# Patient Record
Sex: MALE | Race: WHITE | NOT HISPANIC OR LATINO | Employment: FULL TIME | URBAN - METROPOLITAN AREA
[De-identification: names, ages, dates, MRNs, and addresses within clinical notes are randomized per-mention and may not be internally consistent; named-entity substitution may affect disease eponyms.]

---

## 2023-04-12 LAB
ALANINE AMINOTRANSFERASE (SGPT) (U/L) IN SER/PLAS: 25 U/L (ref 10–52)
ANION GAP IN SER/PLAS: 11 MMOL/L (ref 10–20)
CALCIUM (MG/DL) IN SER/PLAS: 8.4 MG/DL (ref 8.6–10.3)
CARBON DIOXIDE, TOTAL (MMOL/L) IN SER/PLAS: 27 MMOL/L (ref 21–32)
CHLORIDE (MMOL/L) IN SER/PLAS: 106 MMOL/L (ref 98–107)
CHOLESTEROL (MG/DL) IN SER/PLAS: 147 MG/DL (ref 0–199)
CHOLESTEROL IN HDL (MG/DL) IN SER/PLAS: 60.1 MG/DL
CHOLESTEROL/HDL RATIO: 2.4
COBALAMIN (VITAMIN B12) (PG/ML) IN SER/PLAS: 716 PG/ML (ref 211–911)
CREATININE (MG/DL) IN SER/PLAS: 1.08 MG/DL (ref 0.5–1.3)
ERYTHROCYTE DISTRIBUTION WIDTH (RATIO) BY AUTOMATED COUNT: 13.5 % (ref 11.5–14.5)
ERYTHROCYTE MEAN CORPUSCULAR HEMOGLOBIN CONCENTRATION (G/DL) BY AUTOMATED: 32.5 G/DL (ref 32–36)
ERYTHROCYTE MEAN CORPUSCULAR VOLUME (FL) BY AUTOMATED COUNT: 97 FL (ref 80–100)
ERYTHROCYTES (10*6/UL) IN BLOOD BY AUTOMATED COUNT: 4.31 X10E12/L (ref 4.5–5.9)
ESTIMATED AVERAGE GLUCOSE FOR HBA1C: 146 MG/DL
GFR MALE: 74 ML/MIN/1.73M2
GLUCOSE (MG/DL) IN SER/PLAS: 129 MG/DL (ref 74–99)
HEMATOCRIT (%) IN BLOOD BY AUTOMATED COUNT: 41.8 % (ref 41–52)
HEMOGLOBIN (G/DL) IN BLOOD: 13.6 G/DL (ref 13.5–17.5)
HEMOGLOBIN A1C/HEMOGLOBIN TOTAL IN BLOOD: 6.7 %
LDL: 73 MG/DL (ref 0–99)
LEUKOCYTES (10*3/UL) IN BLOOD BY AUTOMATED COUNT: 3.3 X10E9/L (ref 4.4–11.3)
PLATELETS (10*3/UL) IN BLOOD AUTOMATED COUNT: 133 X10E9/L (ref 150–450)
POTASSIUM (MMOL/L) IN SER/PLAS: 4.2 MMOL/L (ref 3.5–5.3)
PROSTATE SPECIFIC AG (NG/ML) IN SER/PLAS: 1.29 NG/ML (ref 0–4)
SODIUM (MMOL/L) IN SER/PLAS: 140 MMOL/L (ref 136–145)
THYROTROPIN (MIU/L) IN SER/PLAS BY DETECTION LIMIT <= 0.05 MIU/L: 0.68 MIU/L (ref 0.44–3.98)
TRIGLYCERIDE (MG/DL) IN SER/PLAS: 70 MG/DL (ref 0–149)
UREA NITROGEN (MG/DL) IN SER/PLAS: 14 MG/DL (ref 6–23)
VLDL: 14 MG/DL (ref 0–40)

## 2023-04-17 ENCOUNTER — OFFICE VISIT (OUTPATIENT)
Dept: PRIMARY CARE | Facility: CLINIC | Age: 68
End: 2023-04-17
Payer: MEDICARE

## 2023-04-17 VITALS
HEIGHT: 70 IN | BODY MASS INDEX: 27.75 KG/M2 | TEMPERATURE: 98.3 F | DIASTOLIC BLOOD PRESSURE: 90 MMHG | SYSTOLIC BLOOD PRESSURE: 140 MMHG | WEIGHT: 193.8 LBS

## 2023-04-17 DIAGNOSIS — M79.605 PAIN OF LEFT LOWER EXTREMITY: Primary | ICD-10-CM

## 2023-04-17 DIAGNOSIS — E11.9 TYPE 2 DIABETES MELLITUS WITHOUT COMPLICATION, WITHOUT LONG-TERM CURRENT USE OF INSULIN (MULTI): ICD-10-CM

## 2023-04-17 DIAGNOSIS — D72.819 LEUKOPENIA, UNSPECIFIED TYPE: ICD-10-CM

## 2023-04-17 PROBLEM — E53.8 B12 DEFICIENCY: Status: ACTIVE | Noted: 2023-04-17

## 2023-04-17 PROBLEM — M17.0 OSTEOARTHRITIS OF KNEES, BILATERAL: Status: ACTIVE | Noted: 2023-04-17

## 2023-04-17 PROBLEM — K30 FUNCTIONAL DYSPEPSIA: Status: ACTIVE | Noted: 2023-04-17

## 2023-04-17 PROBLEM — E78.5 HYPERLIPIDEMIA: Status: ACTIVE | Noted: 2023-04-17

## 2023-04-17 PROBLEM — D75.89 MACROCYTOSIS: Status: ACTIVE | Noted: 2023-04-17

## 2023-04-17 PROBLEM — N41.1 PROSTATITIS, CHRONIC: Status: ACTIVE | Noted: 2017-06-06

## 2023-04-17 PROBLEM — I10 BENIGN ESSENTIAL HYPERTENSION: Status: ACTIVE | Noted: 2023-04-17

## 2023-04-17 PROBLEM — R79.89 ELEVATED SERUM CREATININE: Status: ACTIVE | Noted: 2023-04-17

## 2023-04-17 PROBLEM — N42.9 DISORDER OF PROSTATE: Status: ACTIVE | Noted: 2017-06-06

## 2023-04-17 PROBLEM — R93.1 AGATSTON CORONARY ARTERY CALCIUM SCORE BETWEEN 200 AND 399: Status: ACTIVE | Noted: 2023-04-17

## 2023-04-17 PROBLEM — N52.9 MALE ERECTILE DYSFUNCTION, UNSPECIFIED: Status: ACTIVE | Noted: 2017-06-06

## 2023-04-17 PROBLEM — D69.6 THROMBOCYTOPENIA (CMS-HCC): Status: ACTIVE | Noted: 2023-04-17

## 2023-04-17 PROBLEM — E03.9 HYPOTHYROIDISM: Status: ACTIVE | Noted: 2023-04-17

## 2023-04-17 PROBLEM — K21.9 GASTROESOPHAGEAL REFLUX DISEASE WITHOUT ESOPHAGITIS: Status: ACTIVE | Noted: 2023-04-17

## 2023-04-17 PROBLEM — K58.9 IRRITABLE BOWEL: Status: ACTIVE | Noted: 2023-04-17

## 2023-04-17 PROCEDURE — 1160F RVW MEDS BY RX/DR IN RCRD: CPT | Performed by: INTERNAL MEDICINE

## 2023-04-17 PROCEDURE — 3044F HG A1C LEVEL LT 7.0%: CPT | Performed by: INTERNAL MEDICINE

## 2023-04-17 PROCEDURE — 1036F TOBACCO NON-USER: CPT | Performed by: INTERNAL MEDICINE

## 2023-04-17 PROCEDURE — 82570 ASSAY OF URINE CREATININE: CPT

## 2023-04-17 PROCEDURE — 3080F DIAST BP >= 90 MM HG: CPT | Performed by: INTERNAL MEDICINE

## 2023-04-17 PROCEDURE — 1159F MED LIST DOCD IN RCRD: CPT | Performed by: INTERNAL MEDICINE

## 2023-04-17 PROCEDURE — G0439 PPPS, SUBSEQ VISIT: HCPCS | Performed by: INTERNAL MEDICINE

## 2023-04-17 PROCEDURE — 3077F SYST BP >= 140 MM HG: CPT | Performed by: INTERNAL MEDICINE

## 2023-04-17 PROCEDURE — 99397 PER PM REEVAL EST PAT 65+ YR: CPT | Performed by: INTERNAL MEDICINE

## 2023-04-17 PROCEDURE — 82043 UR ALBUMIN QUANTITATIVE: CPT

## 2023-04-17 PROCEDURE — 4010F ACE/ARB THERAPY RXD/TAKEN: CPT | Performed by: INTERNAL MEDICINE

## 2023-04-17 PROCEDURE — 1170F FXNL STATUS ASSESSED: CPT | Performed by: INTERNAL MEDICINE

## 2023-04-17 RX ORDER — AMITRIPTYLINE HYDROCHLORIDE 10 MG/1
1 TABLET, FILM COATED ORAL NIGHTLY
COMMUNITY
Start: 2021-05-17 | End: 2024-03-18 | Stop reason: WASHOUT

## 2023-04-17 RX ORDER — LANOLIN ALCOHOL/MO/W.PET/CERES
1000 CREAM (GRAM) TOPICAL
COMMUNITY

## 2023-04-17 RX ORDER — TRAZODONE HYDROCHLORIDE 50 MG/1
TABLET ORAL
COMMUNITY
Start: 2019-02-07 | End: 2023-04-17

## 2023-04-17 RX ORDER — LEVOTHYROXINE SODIUM 150 UG/1
1 TABLET ORAL DAILY
COMMUNITY
Start: 2014-01-29 | End: 2023-08-24 | Stop reason: SDUPTHER

## 2023-04-17 RX ORDER — PRAVASTATIN SODIUM 40 MG/1
40 TABLET ORAL
COMMUNITY
End: 2023-08-24 | Stop reason: SDUPTHER

## 2023-04-17 RX ORDER — METFORMIN HYDROCHLORIDE 500 MG/1
500 TABLET ORAL 2 TIMES DAILY
COMMUNITY
Start: 2018-07-17 | End: 2023-04-24 | Stop reason: SDUPTHER

## 2023-04-17 RX ORDER — ASPIRIN 81 MG/81MG
81 CAPSULE ORAL
COMMUNITY

## 2023-04-17 RX ORDER — LOSARTAN POTASSIUM 100 MG/1
1 TABLET ORAL DAILY
COMMUNITY
Start: 2016-06-30 | End: 2023-08-24 | Stop reason: SDUPTHER

## 2023-04-17 RX ORDER — OMEPRAZOLE 40 MG/1
40 CAPSULE, DELAYED RELEASE ORAL DAILY PRN
COMMUNITY
End: 2023-04-24 | Stop reason: SDUPTHER

## 2023-04-17 RX ORDER — METOPROLOL SUCCINATE 50 MG/1
1 TABLET, EXTENDED RELEASE ORAL DAILY
COMMUNITY
Start: 2017-01-31 | End: 2023-04-26 | Stop reason: SDUPTHER

## 2023-04-17 ASSESSMENT — ENCOUNTER SYMPTOMS
DIZZINESS: 0
ABDOMINAL PAIN: 0
VOMITING: 0
EYE PAIN: 0
DIFFICULTY URINATING: 0
BRUISES/BLEEDS EASILY: 0
POLYDIPSIA: 0
RHINORRHEA: 0
TROUBLE SWALLOWING: 0
SHORTNESS OF BREATH: 0
COUGH: 0
TREMORS: 0
COLOR CHANGE: 0
FATIGUE: 0
FEVER: 0
DIARRHEA: 0
BLOOD IN STOOL: 0
CHILLS: 0
FREQUENCY: 0
SORE THROAT: 0
WOUND: 0
VOICE CHANGE: 0
UNEXPECTED WEIGHT CHANGE: 0
ARTHRALGIAS: 0
PALPITATIONS: 0
ADENOPATHY: 0
NAUSEA: 0
DYSURIA: 0
HEADACHES: 0
HEMATURIA: 0
CONSTIPATION: 0

## 2023-04-17 ASSESSMENT — ACTIVITIES OF DAILY LIVING (ADL)
DRESSING: INDEPENDENT
GROCERY_SHOPPING: INDEPENDENT
TAKING_MEDICATION: INDEPENDENT
BATHING: INDEPENDENT
MANAGING_FINANCES: INDEPENDENT
DOING_HOUSEWORK: INDEPENDENT

## 2023-04-17 ASSESSMENT — PATIENT HEALTH QUESTIONNAIRE - PHQ9
2. FEELING DOWN, DEPRESSED OR HOPELESS: NOT AT ALL
SUM OF ALL RESPONSES TO PHQ9 QUESTIONS 1 AND 2: 0
1. LITTLE INTEREST OR PLEASURE IN DOING THINGS: NOT AT ALL
2. FEELING DOWN, DEPRESSED OR HOPELESS: NOT AT ALL
1. LITTLE INTEREST OR PLEASURE IN DOING THINGS: NOT AT ALL
SUM OF ALL RESPONSES TO PHQ9 QUESTIONS 1 AND 2: 0

## 2023-04-17 NOTE — PATIENT INSTRUCTIONS
Stay focused on a healthy lifestyle with regular exercise.  Lets get back together in 4 months.  Please see the orthopedist as well as a hematologist as we discussed  Make sure you stay up-to-date with your eye doctor.

## 2023-04-17 NOTE — PROGRESS NOTES
"Subjective   Reason for Visit: Raphael Crain is an 67 y.o. male here for a Medicare Wellness visit.          Reviewed all medications by prescribing practitioner or clinical pharmacist (such as prescriptions, OTCs, herbal therapies and supplements) and documented in the medical record.    HPI  Overall well     Patient Care Team:  Keely Bradley MD as PCP - General     Review of Systems   Constitutional:  Negative for chills, fatigue, fever and unexpected weight change.   HENT:  Negative for congestion, ear pain, nosebleeds, rhinorrhea, sore throat, trouble swallowing and voice change.    Eyes:  Negative for pain and visual disturbance.   Respiratory:  Negative for cough and shortness of breath.    Cardiovascular:  Negative for chest pain, palpitations and leg swelling.   Gastrointestinal:  Negative for abdominal pain, blood in stool, constipation, diarrhea, nausea and vomiting.   Endocrine: Negative for cold intolerance, heat intolerance, polydipsia and polyuria.   Genitourinary:  Negative for difficulty urinating, dysuria, frequency, hematuria and urgency.   Musculoskeletal:  Negative for arthralgias.   Skin:  Negative for color change and wound.   Neurological:  Negative for dizziness, tremors and headaches.   Hematological:  Negative for adenopathy. Does not bruise/bleed easily.       Objective   Vitals:  /90   Temp 36.8 °C (98.3 °F)   Ht 1.765 m (5' 9.5\")   Wt 87.9 kg (193 lb 12.8 oz)   BMI 28.21 kg/m²       Physical Exam  Constitutional:       General: He is not in acute distress.     Appearance: Normal appearance. He is not ill-appearing.   HENT:      Head: Normocephalic and atraumatic.      Right Ear: Tympanic membrane and ear canal normal.      Left Ear: Tympanic membrane and ear canal normal.      Nose: Nose normal.      Mouth/Throat:      Mouth: Mucous membranes are moist.      Pharynx: Oropharynx is clear.   Eyes:      General: No scleral icterus.     Extraocular Movements: Extraocular " movements intact.      Conjunctiva/sclera: Conjunctivae normal.      Pupils: Pupils are equal, round, and reactive to light.   Cardiovascular:      Rate and Rhythm: Normal rate and regular rhythm.      Pulses: Normal pulses.      Heart sounds: No murmur heard.     No friction rub.   Pulmonary:      Effort: Pulmonary effort is normal.      Breath sounds: Normal breath sounds. No rhonchi or rales.   Abdominal:      General: Abdomen is flat. Bowel sounds are normal. There is no distension.      Palpations: Abdomen is soft. There is no mass.      Tenderness: There is no abdominal tenderness.   Musculoskeletal:      Cervical back: Normal range of motion and neck supple.      Right lower leg: No edema.      Left lower leg: No edema.   Skin:     General: Skin is warm.   Neurological:      General: No focal deficit present.      Mental Status: He is alert and oriented to person, place, and time.      Cranial Nerves: No cranial nerve deficit.   Psychiatric:         Mood and Affect: Mood normal.         Behavior: Behavior normal.         Judgment: Judgment normal.         Lab Results   Component Value Date    WBC 3.3 (L) 04/12/2023    HGB 13.6 04/12/2023    HCT 41.8 04/12/2023     (L) 04/12/2023    CHOL 147 04/12/2023    TRIG 70 04/12/2023    HDL 60.1 04/12/2023    ALT 25 04/12/2023    AST 21 02/26/2020     04/12/2023    K 4.2 04/12/2023     04/12/2023    CREATININE 1.08 04/12/2023    BUN 14 04/12/2023    CO2 27 04/12/2023    TSH 0.68 04/12/2023    PSA 1.29 04/12/2023    HGBA1C 6.7 (A) 04/12/2023     par   Assessment/Plan   Problem List Items Addressed This Visit    None    #1 LBP- fair  #2 IRBBB-stress test normal 2013. clinically stable  #3 rt chest wall pain- resolved. Follow-up when necessary  #4 hypertension-  stable. con't rx  #5 diabetes- excellent. reviewed at length.pending appt eye doctor. Urine micral today. Continue excellent diet and exercise program. Check labs. Follow-up 4 months  #6 mild  leukopenia/thrombocytopenia/anemia-stable retest.  Reviewed again.  At this point, patient agrees to see hematologist consult entered  #7 elevated AST- normal on retest. Continue to follow  #8 thyroid-recheck 3 mths  #9 lipids- check labs 3 mths  #10 CAD- by coronary artery calcium scan.   #11 chronic prostatitis- follows w/ urology q year.   #12 elevated creatine-normal.  Continue to follow.  #13 ongoing insomnia- reviewed at length. Discussed CBD. We will try low-dose trazodone. Reviewed use side effects and risks.  #14 return of rt rib pain- resolved  #15 gerd- s/p EGD, will try to reduce to qod PPI.   #16 foot pain- good, f/u ortho.  #17 macrocytosis- better. follow  #18 b12- begin b 12 1000 mcg po qd. apparent pernicious anemia, continue. Recheck   #19 dyspepsia/chronic gastritis/IBS- good, stable. f/u GI  #20 leg pain-  ?IT band syndrome.  S/p PT. S/p ortho.  Will c/s sports med.       s/p shingrix 2/2  colo 2015--> 2025

## 2023-04-18 LAB
ALBUMIN (MG/L) IN URINE: 7.9 MG/L
ALBUMIN/CREATININE (UG/MG) IN URINE: 4.9 UG/MG CRT (ref 0–30)
CREATININE (MG/DL) IN URINE: 160 MG/DL (ref 20–370)

## 2023-04-21 ENCOUNTER — TELEPHONE (OUTPATIENT)
Dept: PRIMARY CARE | Facility: CLINIC | Age: 68
End: 2023-04-21
Payer: MEDICARE

## 2023-04-24 DIAGNOSIS — E11.9 TYPE 2 DIABETES MELLITUS WITHOUT COMPLICATION, UNSPECIFIED WHETHER LONG TERM INSULIN USE (MULTI): ICD-10-CM

## 2023-04-24 DIAGNOSIS — K21.9 GASTROESOPHAGEAL REFLUX DISEASE WITHOUT ESOPHAGITIS: ICD-10-CM

## 2023-04-24 RX ORDER — METFORMIN HYDROCHLORIDE 500 MG/1
500 TABLET ORAL
Qty: 180 TABLET | Refills: 1 | Status: SHIPPED | OUTPATIENT
Start: 2023-04-24 | End: 2023-08-24 | Stop reason: SDUPTHER

## 2023-04-24 RX ORDER — OMEPRAZOLE 40 MG/1
40 CAPSULE, DELAYED RELEASE ORAL DAILY
Qty: 90 CAPSULE | Refills: 1 | Status: SHIPPED | OUTPATIENT
Start: 2023-04-24 | End: 2023-08-24 | Stop reason: SDUPTHER

## 2023-04-26 DIAGNOSIS — I10 BENIGN ESSENTIAL HYPERTENSION: Primary | ICD-10-CM

## 2023-04-26 RX ORDER — METOPROLOL SUCCINATE 50 MG/1
50 TABLET, EXTENDED RELEASE ORAL DAILY
Qty: 90 TABLET | Refills: 3 | Status: SHIPPED | OUTPATIENT
Start: 2023-04-26 | End: 2024-03-18 | Stop reason: SDUPTHER

## 2023-04-26 NOTE — TELEPHONE ENCOUNTER
----- Message from Raphael Crain sent at 4/26/2023  2:41 PM EDT -----  Regarding: Prescription refill  Contact: [phone]  I've been trying to get a refill on Metoprolol. I've been out for ten days. My last message must have been garbled because I got a refill for Metformin instead.    Might I please get a refill on the Metoprolol?    [facility]'\[facility] [place] [facility]". [place] [address]     Thank you.    Denys Crain

## 2023-08-11 ENCOUNTER — TELEPHONE (OUTPATIENT)
Dept: PRIMARY CARE | Facility: CLINIC | Age: 68
End: 2023-08-11
Payer: MEDICARE

## 2023-08-11 NOTE — TELEPHONE ENCOUNTER
Pt left a msg stating that he has an up coming appt for a 4 mo follow up.  Is he to have blood work done prior?

## 2023-08-16 NOTE — TELEPHONE ENCOUNTER
Patient stopped in, asking about lab work prior to appt.  He hadn't heard back after leaving message last week.  I advised him of the new policy that labs are now ordered at the time of the appt, not prior.  He verbalized understanding.

## 2023-08-24 ENCOUNTER — OFFICE VISIT (OUTPATIENT)
Dept: PRIMARY CARE | Facility: CLINIC | Age: 68
End: 2023-08-24
Payer: MEDICARE

## 2023-08-24 ENCOUNTER — LAB (OUTPATIENT)
Dept: LAB | Facility: LAB | Age: 68
End: 2023-08-24
Payer: MEDICARE

## 2023-08-24 VITALS
BODY MASS INDEX: 27.86 KG/M2 | SYSTOLIC BLOOD PRESSURE: 130 MMHG | WEIGHT: 191.4 LBS | TEMPERATURE: 97.1 F | DIASTOLIC BLOOD PRESSURE: 82 MMHG

## 2023-08-24 DIAGNOSIS — E78.5 HYPERLIPIDEMIA, UNSPECIFIED HYPERLIPIDEMIA TYPE: ICD-10-CM

## 2023-08-24 DIAGNOSIS — E11.9 TYPE 2 DIABETES MELLITUS WITHOUT COMPLICATION, UNSPECIFIED WHETHER LONG TERM INSULIN USE (MULTI): ICD-10-CM

## 2023-08-24 DIAGNOSIS — D69.6 THROMBOCYTOPENIA (CMS-HCC): ICD-10-CM

## 2023-08-24 DIAGNOSIS — I10 BENIGN ESSENTIAL HYPERTENSION: ICD-10-CM

## 2023-08-24 DIAGNOSIS — E11.9 TYPE 2 DIABETES MELLITUS WITHOUT COMPLICATION, WITHOUT LONG-TERM CURRENT USE OF INSULIN (MULTI): Primary | ICD-10-CM

## 2023-08-24 DIAGNOSIS — K21.9 GASTROESOPHAGEAL REFLUX DISEASE WITHOUT ESOPHAGITIS: ICD-10-CM

## 2023-08-24 DIAGNOSIS — E03.9 HYPOTHYROIDISM, UNSPECIFIED TYPE: ICD-10-CM

## 2023-08-24 DIAGNOSIS — D72.819 LEUKOPENIA, UNSPECIFIED TYPE: ICD-10-CM

## 2023-08-24 DIAGNOSIS — M79.605 PAIN OF LEFT LOWER EXTREMITY: ICD-10-CM

## 2023-08-24 DIAGNOSIS — E11.9 TYPE 2 DIABETES MELLITUS WITHOUT COMPLICATION, WITHOUT LONG-TERM CURRENT USE OF INSULIN (MULTI): ICD-10-CM

## 2023-08-24 LAB
ALANINE AMINOTRANSFERASE (SGPT) (U/L) IN SER/PLAS: 25 U/L (ref 10–52)
ANION GAP IN SER/PLAS: 10 MMOL/L (ref 10–20)
CALCIUM (MG/DL) IN SER/PLAS: 9.2 MG/DL (ref 8.6–10.3)
CARBON DIOXIDE, TOTAL (MMOL/L) IN SER/PLAS: 29 MMOL/L (ref 21–32)
CHLORIDE (MMOL/L) IN SER/PLAS: 105 MMOL/L (ref 98–107)
CREATININE (MG/DL) IN SER/PLAS: 1.09 MG/DL (ref 0.5–1.3)
ERYTHROCYTE DISTRIBUTION WIDTH (RATIO) BY AUTOMATED COUNT: 13.2 % (ref 11.5–14.5)
ERYTHROCYTE MEAN CORPUSCULAR HEMOGLOBIN CONCENTRATION (G/DL) BY AUTOMATED: 33.2 G/DL (ref 32–36)
ERYTHROCYTE MEAN CORPUSCULAR VOLUME (FL) BY AUTOMATED COUNT: 97 FL (ref 80–100)
ERYTHROCYTES (10*6/UL) IN BLOOD BY AUTOMATED COUNT: 4.42 X10E12/L (ref 4.5–5.9)
ESTIMATED AVERAGE GLUCOSE FOR HBA1C: 143 MG/DL
GFR MALE: 73 ML/MIN/1.73M2
GLUCOSE (MG/DL) IN SER/PLAS: 119 MG/DL (ref 74–99)
HEMATOCRIT (%) IN BLOOD BY AUTOMATED COUNT: 42.8 % (ref 41–52)
HEMOGLOBIN (G/DL) IN BLOOD: 14.2 G/DL (ref 13.5–17.5)
HEMOGLOBIN A1C/HEMOGLOBIN TOTAL IN BLOOD: 6.6 %
LEUKOCYTES (10*3/UL) IN BLOOD BY AUTOMATED COUNT: 3.8 X10E9/L (ref 4.4–11.3)
PLATELETS (10*3/UL) IN BLOOD AUTOMATED COUNT: 167 X10E9/L (ref 150–450)
POTASSIUM (MMOL/L) IN SER/PLAS: 4.5 MMOL/L (ref 3.5–5.3)
SODIUM (MMOL/L) IN SER/PLAS: 139 MMOL/L (ref 136–145)
UREA NITROGEN (MG/DL) IN SER/PLAS: 17 MG/DL (ref 6–23)

## 2023-08-24 PROCEDURE — 80048 BASIC METABOLIC PNL TOTAL CA: CPT

## 2023-08-24 PROCEDURE — 84460 ALANINE AMINO (ALT) (SGPT): CPT

## 2023-08-24 PROCEDURE — 36415 COLL VENOUS BLD VENIPUNCTURE: CPT

## 2023-08-24 PROCEDURE — 3079F DIAST BP 80-89 MM HG: CPT | Performed by: INTERNAL MEDICINE

## 2023-08-24 PROCEDURE — 1159F MED LIST DOCD IN RCRD: CPT | Performed by: INTERNAL MEDICINE

## 2023-08-24 PROCEDURE — 3044F HG A1C LEVEL LT 7.0%: CPT | Performed by: INTERNAL MEDICINE

## 2023-08-24 PROCEDURE — 1160F RVW MEDS BY RX/DR IN RCRD: CPT | Performed by: INTERNAL MEDICINE

## 2023-08-24 PROCEDURE — 1036F TOBACCO NON-USER: CPT | Performed by: INTERNAL MEDICINE

## 2023-08-24 PROCEDURE — 85027 COMPLETE CBC AUTOMATED: CPT

## 2023-08-24 PROCEDURE — 4010F ACE/ARB THERAPY RXD/TAKEN: CPT | Performed by: INTERNAL MEDICINE

## 2023-08-24 PROCEDURE — 99214 OFFICE O/P EST MOD 30 MIN: CPT | Performed by: INTERNAL MEDICINE

## 2023-08-24 PROCEDURE — 83036 HEMOGLOBIN GLYCOSYLATED A1C: CPT

## 2023-08-24 PROCEDURE — 3075F SYST BP GE 130 - 139MM HG: CPT | Performed by: INTERNAL MEDICINE

## 2023-08-24 RX ORDER — OMEPRAZOLE 40 MG/1
40 CAPSULE, DELAYED RELEASE ORAL DAILY
Qty: 90 CAPSULE | Refills: 1 | Status: SHIPPED | OUTPATIENT
Start: 2023-08-24 | End: 2024-03-18 | Stop reason: SDUPTHER

## 2023-08-24 RX ORDER — LOSARTAN POTASSIUM 100 MG/1
100 TABLET ORAL DAILY
Qty: 90 TABLET | Refills: 1 | Status: SHIPPED | OUTPATIENT
Start: 2023-08-24 | End: 2024-02-27 | Stop reason: SDUPTHER

## 2023-08-24 RX ORDER — METFORMIN HYDROCHLORIDE 500 MG/1
500 TABLET ORAL
Qty: 180 TABLET | Refills: 1 | Status: SHIPPED | OUTPATIENT
Start: 2023-08-24 | End: 2024-02-27 | Stop reason: SDUPTHER

## 2023-08-24 RX ORDER — PRAVASTATIN SODIUM 40 MG/1
40 TABLET ORAL
Qty: 90 TABLET | Refills: 1 | Status: SHIPPED | OUTPATIENT
Start: 2023-08-24 | End: 2024-02-27 | Stop reason: SDUPTHER

## 2023-08-24 RX ORDER — LEVOTHYROXINE SODIUM 150 UG/1
150 TABLET ORAL DAILY
Qty: 90 TABLET | Refills: 1 | Status: SHIPPED | OUTPATIENT
Start: 2023-08-24 | End: 2024-03-18 | Stop reason: SDUPTHER

## 2023-08-24 NOTE — PROGRESS NOTES
Subjective   Patient ID: Raphael Crain is a 68 y.o. male who presents for 4 month follow up.    HPI   Overall well   #1 LBP  #2 IRBBB   #3 rt chest wall pain- resolved.    #4 hypertension-     #5 diabetes-    #6 mild leukopenia/thrombocytopenia/anemia-   #7 elevated AST-    #8 thyroid  #9 lipids  #10 CAD- by coronary artery calcium scan.   No CP/SOB  #11 chronic prostatitis- follows w/ urology q year.   #12 elevated creatine-normal.  Continue to follow.    #13 ongoing insomnia-    #14 return of rt rib pain- resolved  #15 gerd- s/p EGD,    #16 foot pain- good, f/u ortho.  #17 macrocytosis- better. follow  #18 b12- begin b 12 1000 mcg po qd. apparent pernicious anemia, continue. Recheck   #19 dyspepsia/chronic gastritis/IBS- good, stable. f/u GI  #20 leg pain-  continues     s/p shingrix 2/2  colo 2015--> 2025    Review of Systems    Objective   /82 (BP Location: Left arm, Patient Position: Sitting, BP Cuff Size: Adult)   Temp 36.2 °C (97.1 °F) (Skin)   Wt 86.8 kg (191 lb 6.4 oz)   BMI 27.86 kg/m²     Physical Exam      Lab Results   Component Value Date    WBC 3.3 (L) 04/12/2023    HGB 13.6 04/12/2023    HCT 41.8 04/12/2023     (L) 04/12/2023    CHOL 147 04/12/2023    TRIG 70 04/12/2023    HDL 60.1 04/12/2023    ALT 25 04/12/2023    AST 21 02/26/2020     04/12/2023    K 4.2 04/12/2023     04/12/2023    CREATININE 1.08 04/12/2023    BUN 14 04/12/2023    CO2 27 04/12/2023    TSH 0.68 04/12/2023    PSA 1.29 04/12/2023    HGBA1C 6.7 (A) 04/12/2023       Assessment/Plan     #1 LBP- fair  #2 IRBBB-stress test normal 2013. clinically stable  #3 rt chest wall pain- resolved. Follow-up when necessary  #4 hypertension-  a bit elevated.  Home cuff and record.  Bring cuff to next vist.  Reduce salt.  Follow-up 3 months  #5 diabetes- excellent. reviewed at length.pending appt eye doctor. Continue excellent diet and exercise program. Check labs. Follow-up 4 months  #6 mild  leukopenia/thrombocytopenia/anemia-stable retest.  Reviewed again.  At this point, patient agrees to see hematologist consult entered  #7 elevated AST- normal on retest. Continue to follow  #8 thyroid-recheck 6 mths  #9 lipids- check labs 6 mths  #10 CAD- by coronary artery calcium scan.   #11 chronic prostatitis- follows w/ urology q year.   #12 elevated creatine-normal.  Continue to follow.  #13 ongoing insomnia- reviewed at length. Discussed CBD. We will try low-dose trazodone. Reviewed use side effects and risks.  #14 return of rt rib pain- resolved  #15 gerd- s/p EGD, will try to reduce to qod PPI.   #16 foot pain- good, f/u ortho.  #17 macrocytosis- better. follow  #18 b12- begin b 12 1000 mcg po qd. apparent pernicious anemia, continue. Recheck   #19 dyspepsia/chronic gastritis/IBS- good, stable. f/u GI  #20 leg pain-  ?IT band syndrome.  S/p PT. S/p ortho.  Will c/s sports med.       s/p shingrix 2/2  colo 2015--> 2025  Covid/rsv/flu vaccine fall

## 2023-08-24 NOTE — PATIENT INSTRUCTIONS
Stay focused on a healthy lifestyle.  Reduce salt in your diet.  Check your blood pressures at home and bring her cuff to your next visit.  Please see the orthopedist and the hematologist as we discussed

## 2023-11-06 ENCOUNTER — OFFICE VISIT (OUTPATIENT)
Dept: PAIN MEDICINE | Facility: CLINIC | Age: 68
End: 2023-11-06
Payer: MEDICARE

## 2023-11-06 ENCOUNTER — ANCILLARY PROCEDURE (OUTPATIENT)
Dept: RADIOLOGY | Facility: CLINIC | Age: 68
End: 2023-11-06
Payer: MEDICARE

## 2023-11-06 DIAGNOSIS — M17.0 PRIMARY OSTEOARTHRITIS OF BOTH KNEES: Primary | ICD-10-CM

## 2023-11-06 DIAGNOSIS — M84.361G STRESS FRACTURE OF RIGHT TIBIA WITH DELAYED HEALING: ICD-10-CM

## 2023-11-06 DIAGNOSIS — M19.079 ANKLE ARTHRITIS: ICD-10-CM

## 2023-11-06 PROCEDURE — 3075F SYST BP GE 130 - 139MM HG: CPT | Performed by: PHYSICAL MEDICINE & REHABILITATION

## 2023-11-06 PROCEDURE — 3044F HG A1C LEVEL LT 7.0%: CPT | Performed by: PHYSICAL MEDICINE & REHABILITATION

## 2023-11-06 PROCEDURE — 3079F DIAST BP 80-89 MM HG: CPT | Performed by: PHYSICAL MEDICINE & REHABILITATION

## 2023-11-06 PROCEDURE — 4010F ACE/ARB THERAPY RXD/TAKEN: CPT | Performed by: PHYSICAL MEDICINE & REHABILITATION

## 2023-11-06 PROCEDURE — 1159F MED LIST DOCD IN RCRD: CPT | Performed by: PHYSICAL MEDICINE & REHABILITATION

## 2023-11-06 PROCEDURE — 99204 OFFICE O/P NEW MOD 45 MIN: CPT | Performed by: PHYSICAL MEDICINE & REHABILITATION

## 2023-11-06 PROCEDURE — 1160F RVW MEDS BY RX/DR IN RCRD: CPT | Performed by: PHYSICAL MEDICINE & REHABILITATION

## 2023-11-06 PROCEDURE — 1036F TOBACCO NON-USER: CPT | Performed by: PHYSICAL MEDICINE & REHABILITATION

## 2023-11-06 PROCEDURE — 73590 X-RAY EXAM OF LOWER LEG: CPT | Mod: BILATERAL PROCEDURE | Performed by: RADIOLOGY

## 2023-11-06 PROCEDURE — 73590 X-RAY EXAM OF LOWER LEG: CPT | Mod: 50

## 2023-11-06 NOTE — PROGRESS NOTES
Chief complaint  Pain below  the knees and down toward the ankles    History  Mr Guaman is presenting with pain in the shin bilaterally the pain is worse over the right shin.  The pain is burning and tingling.  It is deep.  Occasionally he will have sensitivity on palpation of the skin.  Of note that he is an avid walker and he was doing 15 to 20 miles a week with stretching and warming up and cooling down.   He started wtih pain in the ankles then knees now the pain is in the shins bilaterally  worse on the  right  Tried rowing machine and that also aggravated pain in the shins  He consulted with podiatry and was given shoe inserts and he use those on a regular basis but did not get much relief from those.    He denied associated back pain except for occasional sensation of tiredness.  Denied any bowel or bladder incontinence.     Denied any fever or chills. No weight loss and no night sweats. No cough or sputum production. No diarrhea   The constipation has been responding to fibers and over the counter medications.     No bladder and bowel incontinence and no other changes in bladder and bowel. No skin changes.  Reports tiredness and fatigability only if the pain is not controlled.   Denied opioids diversion and abuse and denies alcoholism. Denies overuse of the pain medications.    The pain is interfering with activities of daily living, quality of life and quality of sleep. It is limiting the functions and everything takes longer to complete because of the slowing related to the pain. Movements are cautious to avoid aggravation of the symptoms.     Physical Exam  Awake, alert and oriented for time place and persons.  Declined Chaperone for the visit and was adequately  draped for the exam.    Gait widened perimeter of stance but no assistive devices     The Lumbar Spine was tested for the range of motion and inspected for any incisions. I checked for palpable spasms in the paraspinal muscles. I also looked for  trigger points by palpation. The Lumbar facets and sacro-iliac joint were loaded bilaterally. The Hips, Knees and ankles were tested for range of motion and stability, and they were inspected for alignment and contractures, clinical effusion or change in the color of the skin. The orthopedic exam of the lumbar spine and lower limbs showed tenderness over the shins worse on the right compared to the left.  He had increased pain over the changes upon resisted dorsiflexion.  No knee instability.  No ankle instability.  No pain upon palpation of the knees or the ankles range of motion of the lumbar spine is normal     For the lumbar spine and lower limbs included the straight leg raising was tested bilaterally, Sensory to light touch, pinprick, were tested bilaterally. Manual muscle testing of the hip (Flex, Ext, Abd, and Add), Knees (Flex/Ext), Plantar and Dorsiflexors and the big toes extension were tested. DTRs at the knees and ankles and plantar cutaneous were tested. I checked for abnormal movements and tone in the lower limbs. Tinel's sign over the peroneal nerve at the fibular neck was tested. The neurological exam for the lower limbs showed  negative straight leg raising.  No sensorimotor deficits around the knees and ankle.    Hema testing for axial loading and log rotation of the Lumbar spine with the hips negative    Skin of the face, upper and lower limbs showed normal color and texture, no subcutaneous masses, no lesions and no skin ulcers.   Peripheral vascular system: radial and pedal pulses present bilaterally. No edema in the lower limbs, few apparent varicosities.    Diagnosis  Problem List Items Addressed This Visit       Primary osteoarthritis of both knees - Primary    Ankle arthritis    Stress fracture of right tibia with delayed healing    Relevant Orders    XR tibia fibula bilateral 2 views (Completed)        Plan  Reviewed the pain generators.  Went over the types of pain with neuropathic and  nociceptive and different pathologies and therapeutic modalities. Discussed the mechanism of action of interventions from acupuncture, physical therapy , regular exercises, injections, botox, spinal cord stimulation, and role of surgery     I discussed with Mr. Crain about the pain over the shins and discussed with him possibility of stress fracture or   periostitis as related to his walking and running.  I explained to him about the insertion of the foot dorsiflexors over the anterior aspect of the chin and long distance walking may induce stress fracture at the insertion of these tendons over the anterior shin.  I discussed with him about icing and relative rest until we get an x-ray of the tibia.  That sometimes show the pathology.  If he continues to be symptomatic and the x-rays did not give us an answer we will need to consider MRIs of the tibia to rule out the above.    I discussed with him that his symptoms could also be related to radicular pain however his clinical presentation is more in favor of the stress fractures related to long distance walking    The level of clinical decision making in this office visit,  is high, given the high risks of complications with the morbidity and mortality due to the fact that acute and chronic pain may pose a threat to life and bodily function, if under treated, poorly treated, or with failure to maintain adequate treatment and timely medical follow up. Additionally over treatment has its own set of complications including overdosing on the pain medications and also the habit forming potentials with the use of the medications used to treat chronic painful conditions including therapeutic classes classified as dangerous medications. Given the serious and fluctuating nature of pain level and instensity with extensive consideration for whenever pain changes, there is always the risk of prolonged functional impairment requiring close patient monitoring with regular  assessments and reassessments and high level medical decision making at every office visit. The amount and complexity of data reviewed is high given the patient clinical presentation, labs,  data, radiology reports, and other tests as discussed during office visits. Pertinent data whether positive or negative were taken in consideration in the process of making this high level medical decision.    Follow-up after the x-ray or earlier if needed        Addendum I reviewed the results of his x-rays  .  It does show a likely old injury related to stress fracture as we discussed.  I recommend he continue relative rest and icing and to cut back on his walking to half the amount he is doing.  Do not completely stop walking.  We will see how he does in 6 to 8 weeks

## 2023-11-30 ENCOUNTER — OFFICE VISIT (OUTPATIENT)
Dept: PRIMARY CARE | Facility: CLINIC | Age: 68
End: 2023-11-30
Payer: MEDICARE

## 2023-11-30 VITALS
SYSTOLIC BLOOD PRESSURE: 170 MMHG | TEMPERATURE: 97.1 F | WEIGHT: 195.6 LBS | OXYGEN SATURATION: 99 % | HEART RATE: 71 BPM | DIASTOLIC BLOOD PRESSURE: 84 MMHG | BODY MASS INDEX: 28.47 KG/M2

## 2023-11-30 DIAGNOSIS — M79.604 RIGHT LEG PAIN: Primary | ICD-10-CM

## 2023-11-30 DIAGNOSIS — I10 PRIMARY HYPERTENSION: ICD-10-CM

## 2023-11-30 PROCEDURE — 99213 OFFICE O/P EST LOW 20 MIN: CPT | Performed by: INTERNAL MEDICINE

## 2023-11-30 PROCEDURE — 3077F SYST BP >= 140 MM HG: CPT | Performed by: INTERNAL MEDICINE

## 2023-11-30 PROCEDURE — 3044F HG A1C LEVEL LT 7.0%: CPT | Performed by: INTERNAL MEDICINE

## 2023-11-30 PROCEDURE — 1159F MED LIST DOCD IN RCRD: CPT | Performed by: INTERNAL MEDICINE

## 2023-11-30 PROCEDURE — 3079F DIAST BP 80-89 MM HG: CPT | Performed by: INTERNAL MEDICINE

## 2023-11-30 PROCEDURE — 4010F ACE/ARB THERAPY RXD/TAKEN: CPT | Performed by: INTERNAL MEDICINE

## 2023-11-30 PROCEDURE — 1036F TOBACCO NON-USER: CPT | Performed by: INTERNAL MEDICINE

## 2023-11-30 PROCEDURE — 1160F RVW MEDS BY RX/DR IN RCRD: CPT | Performed by: INTERNAL MEDICINE

## 2023-11-30 RX ORDER — GABAPENTIN 100 MG/1
100 CAPSULE ORAL 3 TIMES DAILY
Qty: 90 CAPSULE | Refills: 5 | Status: SHIPPED | OUTPATIENT
Start: 2023-11-30 | End: 2023-11-30 | Stop reason: ENTERED-IN-ERROR

## 2023-11-30 RX ORDER — AMLODIPINE BESYLATE 5 MG/1
5 TABLET ORAL DAILY
Qty: 30 TABLET | Refills: 1 | Status: SHIPPED | OUTPATIENT
Start: 2023-11-30 | End: 2023-11-30

## 2023-11-30 RX ORDER — CYCLOBENZAPRINE HCL 10 MG
10 TABLET ORAL NIGHTLY PRN
Qty: 30 TABLET | Refills: 0 | Status: SHIPPED | OUTPATIENT
Start: 2023-11-30 | End: 2024-01-29

## 2023-11-30 RX ORDER — AMLODIPINE BESYLATE 5 MG/1
5 TABLET ORAL DAILY
Qty: 30 TABLET | Refills: 5 | Status: SHIPPED | OUTPATIENT
Start: 2023-11-30 | End: 2023-11-30 | Stop reason: SDUPTHER

## 2023-11-30 RX ORDER — CYCLOBENZAPRINE HCL 10 MG
10 TABLET ORAL NIGHTLY PRN
Qty: 30 TABLET | Refills: 0 | Status: SHIPPED | OUTPATIENT
Start: 2023-11-30 | End: 2023-11-30 | Stop reason: SDUPTHER

## 2023-11-30 NOTE — PROGRESS NOTES
Subjective   Patient ID: [id number]   hospitals    Raphael Crain is a 68 y.o. male who presents for Leg Pain (Right leg for 18 months.).it happens whenever he sits in the car to drive. If he stands up it goes away.He tried with orthopedic doctor and pain medication nothing helped.        Objective   Visit Vitals  /84 (BP Location: Left arm, Patient Position: Sitting, BP Cuff Size: Adult)   Pulse 71   Temp 36.2 °C (97.1 °F) (Skin)      Review of Systems  All 12 systems reviewed, no other abnormality except that mentioned in HPI.    Physical Exam  Constitutional- no abnormality  ENT- no abnormality  Neck- no swelling  CVS- normal S1 and S2, no murmur.  Pulmonary- clear to auscultation,no rhonchi, no wheezes.  Abdomen- normal- liver and spleen, soft, no distension, bowel sound present.  Neurological- all cranial nerves intact, speech and gait normal, no sensory or motor deficiency.  Musculoskeletal- all pulses are normal, normal movement, no joint swelling.  Skin- no rash, dry.  psychiatry- no suicidal ideation.  Lymph node- no lymphadenopathy      Assessment/Plan   Problem List Items Addressed This Visit    None  Visit Diagnoses       Right leg pain    -  Primary    Relevant Medications    cyclobenzaprine (Flexeril) 10 mg tablet    Primary hypertension        Relevant Medications    amLODIPine (Norvasc) 5 mg tablet        Right lower extremity pain- it is position related, Looks like in his sitting posture, his blood supply is not enough during   but normal during standing. Advised to change his positing during drive  to get a position where he is comfortable.    Marco Cantor MD

## 2023-12-13 DIAGNOSIS — M79.604 RIGHT LEG PAIN: Primary | ICD-10-CM

## 2023-12-20 ENCOUNTER — APPOINTMENT (OUTPATIENT)
Dept: HEMATOLOGY/ONCOLOGY | Facility: CLINIC | Age: 68
End: 2023-12-20
Payer: MEDICARE

## 2023-12-21 ENCOUNTER — OFFICE VISIT (OUTPATIENT)
Dept: PAIN MEDICINE | Facility: CLINIC | Age: 68
End: 2023-12-21
Payer: MEDICARE

## 2023-12-21 VITALS
BODY MASS INDEX: 27.2 KG/M2 | RESPIRATION RATE: 18 BRPM | TEMPERATURE: 97.2 F | SYSTOLIC BLOOD PRESSURE: 150 MMHG | WEIGHT: 190 LBS | HEIGHT: 70 IN | DIASTOLIC BLOOD PRESSURE: 85 MMHG | HEART RATE: 78 BPM | OXYGEN SATURATION: 96 %

## 2023-12-21 DIAGNOSIS — M79.604 RIGHT LEG PAIN: ICD-10-CM

## 2023-12-21 PROCEDURE — 3044F HG A1C LEVEL LT 7.0%: CPT | Performed by: ANESTHESIOLOGY

## 2023-12-21 PROCEDURE — 3079F DIAST BP 80-89 MM HG: CPT | Performed by: ANESTHESIOLOGY

## 2023-12-21 PROCEDURE — 4010F ACE/ARB THERAPY RXD/TAKEN: CPT | Performed by: ANESTHESIOLOGY

## 2023-12-21 PROCEDURE — 3077F SYST BP >= 140 MM HG: CPT | Performed by: ANESTHESIOLOGY

## 2023-12-21 PROCEDURE — 1036F TOBACCO NON-USER: CPT | Performed by: ANESTHESIOLOGY

## 2023-12-21 PROCEDURE — 99214 OFFICE O/P EST MOD 30 MIN: CPT | Performed by: ANESTHESIOLOGY

## 2023-12-21 PROCEDURE — 1125F AMNT PAIN NOTED PAIN PRSNT: CPT | Performed by: ANESTHESIOLOGY

## 2023-12-21 PROCEDURE — 1160F RVW MEDS BY RX/DR IN RCRD: CPT | Performed by: ANESTHESIOLOGY

## 2023-12-21 PROCEDURE — 1159F MED LIST DOCD IN RCRD: CPT | Performed by: ANESTHESIOLOGY

## 2023-12-21 RX ORDER — GABAPENTIN 300 MG/1
CAPSULE ORAL
Qty: 90 CAPSULE | Refills: 1 | Status: SHIPPED | OUTPATIENT
Start: 2023-12-21 | End: 2024-01-30 | Stop reason: SDUPTHER

## 2023-12-21 ASSESSMENT — PATIENT HEALTH QUESTIONNAIRE - PHQ9
SUM OF ALL RESPONSES TO PHQ9 QUESTIONS 1 AND 2: 0
1. LITTLE INTEREST OR PLEASURE IN DOING THINGS: NOT AT ALL
2. FEELING DOWN, DEPRESSED OR HOPELESS: NOT AT ALL

## 2023-12-21 ASSESSMENT — ENCOUNTER SYMPTOMS
WEAKNESS: 1
SHORTNESS OF BREATH: 0
BACK PAIN: 0
DIFFICULTY URINATING: 0
ABDOMINAL PAIN: 0
ADENOPATHY: 0
CHEST TIGHTNESS: 0
LOSS OF SENSATION IN FEET: 1
EYE PAIN: 0
OCCASIONAL FEELINGS OF UNSTEADINESS: 1
DEPRESSION: 0
FEVER: 0

## 2023-12-21 ASSESSMENT — PAIN SCALES - GENERAL
PAINLEVEL_OUTOF10: 7
PAINLEVEL: 7

## 2023-12-21 ASSESSMENT — PAIN - FUNCTIONAL ASSESSMENT: PAIN_FUNCTIONAL_ASSESSMENT: 0-10

## 2023-12-21 ASSESSMENT — COLUMBIA-SUICIDE SEVERITY RATING SCALE - C-SSRS
2. HAVE YOU ACTUALLY HAD ANY THOUGHTS OF KILLING YOURSELF?: NO
6. HAVE YOU EVER DONE ANYTHING, STARTED TO DO ANYTHING, OR PREPARED TO DO ANYTHING TO END YOUR LIFE?: NO
1. IN THE PAST MONTH, HAVE YOU WISHED YOU WERE DEAD OR WISHED YOU COULD GO TO SLEEP AND NOT WAKE UP?: NO

## 2023-12-21 ASSESSMENT — PAIN DESCRIPTION - DESCRIPTORS: DESCRIPTORS: THROBBING

## 2023-12-21 ASSESSMENT — LIFESTYLE VARIABLES: TOTAL SCORE: 0

## 2023-12-21 NOTE — PROGRESS NOTES
Chief Complain    New Patient Visit (Pain is in right leg, that has been going on for a year and a half. Deny neck and back surgery. Has hip and knee pain, currently not taking any meds for pain. Had multiple images done)    History Of Present Illness  Raphael Crain is a 68 y.o. male here for evaluation of right leg pain. The patient has been experiencing these symptoms for last 1 and a quater year(s). The patient describes the pain as throbbing. The patient's current pain score is 7 on a scale from 0-10. The pain is worsened by prolonged sitting and driving  and is alleviated by standing. Since the start of the symptoms the pain has been unchanged.    The patient denies any fever, chills, weight loss, weakness, numbness, bladder/ bowel incontinence, history of cancer, history of IV drug abuse, recent trauma.      Past Medical History  He has a past medical history of Cholesterolosis of gallbladder, Decreased white blood cell count, unspecified, Functional dyspepsia (02/17/2020), Other specified symptoms and signs involving the circulatory and respiratory systems (01/24/2020), Personal history of other diseases of the circulatory system, Personal history of other diseases of the digestive system (01/28/2019), Personal history of other specified conditions (06/12/2014), Personal history of other specified conditions (03/09/2016), and Prediabetes.    Surgical History  He has a past surgical history that includes Other surgical history (02/20/2014); Other surgical history (01/24/2020); Other surgical history (01/24/2020); Other surgical history (09/11/2020); Other surgical history (09/02/2022); and Other surgical history (09/02/2022).    Social History  He reports that he quit smoking about 26 years ago. His smoking use included cigarettes. He has a 48.00 pack-year smoking history. He has been exposed to tobacco smoke. He has never used smokeless tobacco. He reports that he does not currently use alcohol. He reports  that he does not use drugs.    Family History  Family History   Problem Relation Name Age of Onset    Diabetes Mother      Heart failure Father          Allergies  Patient has no known allergies.    Review of Systems  Review of Systems   Constitutional:  Negative for fever.   HENT:  Negative for ear pain.    Eyes:  Negative for pain.   Respiratory:  Negative for chest tightness and shortness of breath.    Cardiovascular:  Negative for chest pain.   Gastrointestinal:  Negative for abdominal pain.   Endocrine: Negative for cold intolerance and heat intolerance.   Genitourinary:  Negative for difficulty urinating.   Musculoskeletal:  Negative for back pain.   Skin:  Negative for rash.   Allergic/Immunologic: Negative for food allergies.   Neurological:  Positive for weakness.   Hematological:  Negative for adenopathy.   Psychiatric/Behavioral:  Negative for suicidal ideas.         Physical Exam  Physical Exam  HENT:      Head: Normocephalic and atraumatic.      Right Ear: External ear normal.      Left Ear: External ear normal.      Nose: Nose normal.      Mouth/Throat:      Mouth: Mucous membranes are moist.   Eyes:      Extraocular Movements: Extraocular movements intact.      Pupils: Pupils are equal, round, and reactive to light.   Cardiovascular:      Rate and Rhythm: Normal rate.   Pulmonary:      Effort: Pulmonary effort is normal.   Abdominal:      Palpations: Abdomen is soft.   Musculoskeletal:      Cervical back: Neck supple.      Lumbar back: No swelling, deformity or tenderness. Decreased range of motion. Positive right straight leg raise test.        Legs:    Skin:     General: Skin is warm.   Neurological:      Mental Status: He is alert and oriented to person, place, and time.      Sensory: Sensation is intact.      Motor: Weakness present.      Deep Tendon Reflexes:      Reflex Scores:       Patellar reflexes are 2+ on the right side and 2+ on the left side.       Achilles reflexes are 0 on the right  "side and 0 on the left side.     Comments: 4+/5 strength of right foot dorsiflexion and right EHL rest all normal  Normal toe walking however unable to do heel walking.   Psychiatric:         Mood and Affect: Mood normal.         Behavior: Behavior normal.           Last Recorded Vitals  Blood pressure 150/85, pulse 78, temperature 36.2 °C (97.2 °F), resp. rate 18, height 1.778 m (5' 10\"), weight 86.2 kg (190 lb), SpO2 96 %.    Reviewed Images  Reviewed and independently interpreted MRI Lumbar spine bilateral L4-5 lateral recess stenosis    Reviewed Labs   Latest Reference Range & Units 08/24/23 08:39   GLUCOSE 74 - 99 mg/dL 119 (H)   SODIUM 136 - 145 mmol/L 139   POTASSIUM 3.5 - 5.3 mmol/L 4.5   CHLORIDE 98 - 107 mmol/L 105   Bicarbonate 21 - 32 mmol/L 29   Anion Gap 10 - 20 mmol/L 10   Blood Urea Nitrogen 6 - 23 mg/dL 17   Creatinine 0.50 - 1.30 mg/dL 1.09   Calcium 8.6 - 10.3 mg/dL 9.2   ALT 10 - 52 U/L 25   (H): Data is abnormally high      Latest Reference Range & Units 08/24/23 08:39   WBC 4.4 - 11.3 x10E9/L 3.8 (L)   RBC 4.50 - 5.90 x10E12/L 4.42 (L)   HEMOGLOBIN 13.5 - 17.5 g/dL 14.2   HEMATOCRIT 41.0 - 52.0 % 42.8   MCV 80 - 100 fL 97   MCHC 32.0 - 36.0 g/dL 33.2   RED CELL DISTRIBUTION WIDTH 11.5 - 14.5 % 13.2   Platelets 150 - 450 x10E9/L 167   (L): Data is abnormally low  Assessment/Plan     Raphael Crain is a 68 y.o. male here for evaluation of right anterolateral aspect of leg pain radiating to ankle and foot, has been experiencing these symptoms for last more than a year or so.  He has been evaluated by several different physicians including his primary care provider, an orthopedic specialist and pain management specialist.  He has had multiple imaging done including an MRI lumbar spine, x-rays of ankle knee and leg.  He has not had any severe osteoarthritic changes of his knee joint.  Review of MRI did reveal some lateral recess stenosis L4-5 which may be responsible for his current symptoms.  The " pain is worse with sitting and driving improves with standing.  He also been recently diagnosed with diabetes, other differential for his pain would include diabetic lumbosacral radicular plexus neuropathy.  He does have a EMG scheduled next month which may help to further delineate the etiology, I would trial him on gabapentin for symptomatic relief.  If he does not respond would consider a diagnostic and therapeutic transforaminal epidural steroid injection.       Boris Michel MD

## 2024-01-04 ENCOUNTER — TELEMEDICINE (OUTPATIENT)
Dept: PAIN MEDICINE | Facility: CLINIC | Age: 69
End: 2024-01-04
Payer: MEDICARE

## 2024-01-04 DIAGNOSIS — M54.16 LUMBAR RADICULOPATHY: Primary | ICD-10-CM

## 2024-01-04 PROCEDURE — 99212 OFFICE O/P EST SF 10 MIN: CPT | Performed by: ANESTHESIOLOGY

## 2024-01-04 ASSESSMENT — PAIN DESCRIPTION - DESCRIPTORS: DESCRIPTORS: THROBBING

## 2024-01-04 ASSESSMENT — PAIN - FUNCTIONAL ASSESSMENT: PAIN_FUNCTIONAL_ASSESSMENT: 0-10

## 2024-01-04 ASSESSMENT — PATIENT HEALTH QUESTIONNAIRE - PHQ9
2. FEELING DOWN, DEPRESSED OR HOPELESS: NOT AT ALL
SUM OF ALL RESPONSES TO PHQ9 QUESTIONS 1 AND 2: 0
1. LITTLE INTEREST OR PLEASURE IN DOING THINGS: NOT AT ALL

## 2024-01-04 ASSESSMENT — COLUMBIA-SUICIDE SEVERITY RATING SCALE - C-SSRS
6. HAVE YOU EVER DONE ANYTHING, STARTED TO DO ANYTHING, OR PREPARED TO DO ANYTHING TO END YOUR LIFE?: NO
1. IN THE PAST MONTH, HAVE YOU WISHED YOU WERE DEAD OR WISHED YOU COULD GO TO SLEEP AND NOT WAKE UP?: NO
2. HAVE YOU ACTUALLY HAD ANY THOUGHTS OF KILLING YOURSELF?: NO

## 2024-01-04 ASSESSMENT — ENCOUNTER SYMPTOMS
OCCASIONAL FEELINGS OF UNSTEADINESS: 0
SHORTNESS OF BREATH: 0
DEPRESSION: 0
LOSS OF SENSATION IN FEET: 0
FEVER: 0

## 2024-01-04 ASSESSMENT — PAIN SCALES - GENERAL: PAINLEVEL_OUTOF10: 5 - MODERATE PAIN

## 2024-01-04 NOTE — PROGRESS NOTES
Chief Complain  Follow-up and Med Management (Started Gabapentin 12/21/2023)       History Of Present Illness  Raphael Crain is a 68 y.o. male here for right leg pain. The patient rates the pain at 3-4  on a scale from 0-10.  The patient describes pain as throbbing.  The pain is worsened by prolonged sitting and is alleviated by medications prescribed pain medications.  Since the last visit the pain has improved.  The patient denies any fever, chills, weight loss, bladder/bowel incontinence.       Past Medical History  He has a past medical history of Cholesterolosis of gallbladder, Decreased white blood cell count, unspecified, Functional dyspepsia (02/17/2020), Other specified symptoms and signs involving the circulatory and respiratory systems (01/24/2020), Personal history of other diseases of the circulatory system, Personal history of other diseases of the digestive system (01/28/2019), Personal history of other specified conditions (06/12/2014), Personal history of other specified conditions (03/09/2016), and Prediabetes.    Surgical History  He has a past surgical history that includes Other surgical history (02/20/2014); Other surgical history (01/24/2020); Other surgical history (01/24/2020); Other surgical history (09/11/2020); Other surgical history (09/02/2022); and Other surgical history (09/02/2022).     Social History  He reports that he quit smoking about 27 years ago. His smoking use included cigarettes. He has a 48.00 pack-year smoking history. He has been exposed to tobacco smoke. He has never used smokeless tobacco. He reports that he does not currently use alcohol. He reports that he does not use drugs.    Family History  Family History   Problem Relation Name Age of Onset    Diabetes Mother      Heart failure Father          Allergies  Patient has no known allergies.    Review of Systems  Review of Systems   Constitutional:  Negative for fever.   Respiratory:  Negative for shortness of  breath.    Cardiovascular:  Negative for chest pain.   Psychiatric/Behavioral:  Negative for suicidal ideas.         Physical Exam  Physical Exam  Neurological:      Mental Status: He is oriented to person, place, and time.           Last Recorded Vitals  There were no vitals taken for this visit.       Assessment/Plan     Raphael Crain is a 68 y.o. male here for follow-up of right leg pain.  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  Last visit he was started on gabapentin with the medication he does report significant improvement in his symptoms.  I would recommend regular exercises, activity modification.  Follow-up with his EMG test which is scheduled later this month.  Continue with gabapentin.  Consider transforaminal epidural steroid injection in future if he continues to have symptoms for diagnostic and therapeutic purposes.       Boris Michel MD

## 2024-01-25 ENCOUNTER — HOSPITAL ENCOUNTER (OUTPATIENT)
Dept: NEUROLOGY | Facility: HOSPITAL | Age: 69
Discharge: HOME | End: 2024-01-25
Payer: MEDICARE

## 2024-01-25 DIAGNOSIS — M79.604 RIGHT LEG PAIN: ICD-10-CM

## 2024-01-25 PROCEDURE — 95885 MUSC TST DONE W/NERV TST LIM: CPT | Performed by: PSYCHIATRY & NEUROLOGY

## 2024-01-25 PROCEDURE — 95908 NRV CNDJ TST 3-4 STUDIES: CPT | Performed by: PSYCHIATRY & NEUROLOGY

## 2024-01-26 ENCOUNTER — TELEPHONE (OUTPATIENT)
Dept: PAIN MEDICINE | Facility: CLINIC | Age: 69
End: 2024-01-26
Payer: MEDICARE

## 2024-01-26 NOTE — TELEPHONE ENCOUNTER
"----- Message from Boris Michel MD sent at 1/25/2024 12:21 PM EST -----  I reviewed the results of the EMG, does appear to have impingement of right L5 nerve root which would explain his symptoms.  If he continues to have significant pain in the next step would be doing an epidural steroid injection.  ----- Message -----  From: Azar Alexander MD  Sent: 1/25/2024  11:33 AM EST  To: Boris Michel MD    [facility] Dr Michel,    This patient had an EMG/NCT testing done in the office today. He requested that I let you know of results as well. Please see report under \"Procedures\"    Thank you and good to \"meet\" you.    Dr Alexander      "

## 2024-01-30 ENCOUNTER — OFFICE VISIT (OUTPATIENT)
Dept: PAIN MEDICINE | Facility: CLINIC | Age: 69
End: 2024-01-30
Payer: MEDICARE

## 2024-01-30 VITALS
RESPIRATION RATE: 16 BRPM | OXYGEN SATURATION: 97 % | HEIGHT: 70 IN | SYSTOLIC BLOOD PRESSURE: 144 MMHG | BODY MASS INDEX: 27.2 KG/M2 | TEMPERATURE: 96.4 F | HEART RATE: 72 BPM | WEIGHT: 190 LBS | DIASTOLIC BLOOD PRESSURE: 80 MMHG

## 2024-01-30 DIAGNOSIS — M79.604 RIGHT LEG PAIN: ICD-10-CM

## 2024-01-30 DIAGNOSIS — M54.16 LUMBAR RADICULOPATHY: Primary | ICD-10-CM

## 2024-01-30 PROCEDURE — 1160F RVW MEDS BY RX/DR IN RCRD: CPT | Performed by: ANESTHESIOLOGY

## 2024-01-30 PROCEDURE — 99213 OFFICE O/P EST LOW 20 MIN: CPT | Performed by: ANESTHESIOLOGY

## 2024-01-30 PROCEDURE — 3077F SYST BP >= 140 MM HG: CPT | Performed by: ANESTHESIOLOGY

## 2024-01-30 PROCEDURE — 3079F DIAST BP 80-89 MM HG: CPT | Performed by: ANESTHESIOLOGY

## 2024-01-30 PROCEDURE — 4010F ACE/ARB THERAPY RXD/TAKEN: CPT | Performed by: ANESTHESIOLOGY

## 2024-01-30 PROCEDURE — 1159F MED LIST DOCD IN RCRD: CPT | Performed by: ANESTHESIOLOGY

## 2024-01-30 PROCEDURE — 1125F AMNT PAIN NOTED PAIN PRSNT: CPT | Performed by: ANESTHESIOLOGY

## 2024-01-30 PROCEDURE — 1036F TOBACCO NON-USER: CPT | Performed by: ANESTHESIOLOGY

## 2024-01-30 RX ORDER — GABAPENTIN 300 MG/1
300 CAPSULE ORAL 3 TIMES DAILY
Qty: 90 CAPSULE | Refills: 3 | Status: SHIPPED | OUTPATIENT
Start: 2024-01-30 | End: 2024-03-04 | Stop reason: SDUPTHER

## 2024-01-30 SDOH — ECONOMIC STABILITY: FOOD INSECURITY: WITHIN THE PAST 12 MONTHS, YOU WORRIED THAT YOUR FOOD WOULD RUN OUT BEFORE YOU GOT MONEY TO BUY MORE.: NEVER TRUE

## 2024-01-30 SDOH — ECONOMIC STABILITY: FOOD INSECURITY: WITHIN THE PAST 12 MONTHS, THE FOOD YOU BOUGHT JUST DIDN'T LAST AND YOU DIDN'T HAVE MONEY TO GET MORE.: NEVER TRUE

## 2024-01-30 SDOH — ECONOMIC STABILITY: HOUSING INSECURITY
IN THE LAST 12 MONTHS, WAS THERE A TIME WHEN YOU DID NOT HAVE A STEADY PLACE TO SLEEP OR SLEPT IN A SHELTER (INCLUDING NOW)?: NO

## 2024-01-30 SDOH — ECONOMIC STABILITY: INCOME INSECURITY: IN THE LAST 12 MONTHS, WAS THERE A TIME WHEN YOU WERE NOT ABLE TO PAY THE MORTGAGE OR RENT ON TIME?: NO

## 2024-01-30 ASSESSMENT — ENCOUNTER SYMPTOMS
DEPRESSION: 0
SHORTNESS OF BREATH: 0
OCCASIONAL FEELINGS OF UNSTEADINESS: 0
LOSS OF SENSATION IN FEET: 0
FEVER: 0

## 2024-01-30 ASSESSMENT — LIFESTYLE VARIABLES
AUDIT-C TOTAL SCORE: 4
HOW MANY STANDARD DRINKS CONTAINING ALCOHOL DO YOU HAVE ON A TYPICAL DAY: 1 OR 2
AUDIT TOTAL SCORE: 4
TOTAL SCORE: 0
HOW OFTEN DURING THE LAST YEAR HAVE YOU FAILED TO DO WHAT WAS NORMALLY EXPECTED FROM YOU BECAUSE OF DRINKING: NEVER
HOW OFTEN DURING THE LAST YEAR HAVE YOU NEEDED AN ALCOHOLIC DRINK FIRST THING IN THE MORNING TO GET YOURSELF GOING AFTER A NIGHT OF HEAVY DRINKING: NEVER
HOW OFTEN DURING THE LAST YEAR HAVE YOU BEEN UNABLE TO REMEMBER WHAT HAPPENED THE NIGHT BEFORE BECAUSE YOU HAD BEEN DRINKING: NEVER
HOW OFTEN DO YOU HAVE A DRINK CONTAINING ALCOHOL: 4 OR MORE TIMES A WEEK
HAVE YOU OR SOMEONE ELSE BEEN INJURED AS A RESULT OF YOUR DRINKING: NO
HOW OFTEN DURING THE LAST YEAR HAVE YOU HAD A FEELING OF GUILT OR REMORSE AFTER DRINKING: NEVER
HOW OFTEN DO YOU HAVE SIX OR MORE DRINKS ON ONE OCCASION: NEVER
HOW OFTEN DURING THE LAST YEAR HAVE YOU FOUND THAT YOU WERE NOT ABLE TO STOP DRINKING ONCE YOU HAD STARTED: NEVER
SKIP TO QUESTIONS 9-10: 1
HAS A RELATIVE, FRIEND, DOCTOR, OR ANOTHER HEALTH PROFESSIONAL EXPRESSED CONCERN ABOUT YOUR DRINKING OR SUGGESTED YOU CUT DOWN: NO

## 2024-01-30 ASSESSMENT — PATIENT HEALTH QUESTIONNAIRE - PHQ9
1. LITTLE INTEREST OR PLEASURE IN DOING THINGS: NOT AT ALL
SUM OF ALL RESPONSES TO PHQ9 QUESTIONS 1 AND 2: 0
2. FEELING DOWN, DEPRESSED OR HOPELESS: NOT AT ALL

## 2024-01-30 ASSESSMENT — PAIN SCALES - GENERAL: PAINLEVEL: 3

## 2024-01-30 NOTE — PROGRESS NOTES
Chief Complain  Follow-up (Had a EMG want to discuss what the next steps are, Gabapentin is giving about 75% relief )       History Of Present Illness  Raphael Crain is a 68 y.o. male here for right leg pain. The patient rates the pain at 2-3  on a scale from 0-10.  The patient describes pain as aching.  The pain is worsened by  exercise  and is alleviated by medications prescribed pain medications.  Since the last visit the pain has improved.  The patient denies any fever, chills, weight loss, bladder/bowel incontinence.         Past Medical History  He has a past medical history of Cholesterolosis of gallbladder, Decreased white blood cell count, unspecified, Functional dyspepsia (02/17/2020), Other specified symptoms and signs involving the circulatory and respiratory systems (01/24/2020), Personal history of other diseases of the circulatory system, Personal history of other diseases of the digestive system (01/28/2019), Personal history of other specified conditions (06/12/2014), Personal history of other specified conditions (03/09/2016), and Prediabetes.    Surgical History  He has a past surgical history that includes Other surgical history (02/20/2014); Other surgical history (01/24/2020); Other surgical history (01/24/2020); Other surgical history (09/11/2020); Other surgical history (09/02/2022); and Other surgical history (09/02/2022).     Social History  He reports that he quit smoking about 27 years ago. His smoking use included cigarettes. He has a 48.00 pack-year smoking history. He has been exposed to tobacco smoke. He has never used smokeless tobacco. He reports current alcohol use of about 14.0 standard drinks of alcohol per week. He reports that he does not use drugs.    Family History  Family History   Problem Relation Name Age of Onset    Diabetes Mother      Heart failure Father          Allergies  Patient has no known allergies.    Review of Systems  Review of Systems   Constitutional:   "Negative for fever.   Respiratory:  Negative for shortness of breath.    Cardiovascular:  Negative for chest pain.   Psychiatric/Behavioral:  Negative for suicidal ideas.         Physical Exam  Physical Exam  HENT:      Head: Normocephalic and atraumatic.   Eyes:      Extraocular Movements: Extraocular movements intact.   Pulmonary:      Effort: Pulmonary effort is normal.   Musculoskeletal:      Cervical back: Neck supple.   Neurological:      Mental Status: He is alert and oriented to person, place, and time.         Last Recorded Vitals  Blood pressure 144/80, pulse 72, temperature 35.8 °C (96.4 °F), temperature source Temporal, resp. rate 16, height 1.778 m (5' 10\"), weight 86.2 kg (190 lb), SpO2 97 %.       Assessment/Plan     Raphael Crain is a 68 y.o. male here for follow-up of right-sided knee pain radiating to right anterolateral leg.  He has been experiencing the symptoms for more than a year or so.  MRI of his lumbar spine did reveal some lateral recess stenosis L4-5, recently an EMG revealed chronic mild L5 radiculopathy which may be responsible for her symptoms.  Last visit he was put on gabapentin which has provided him with 70% relief of pain.  Given the relief I would recommend continuing the treatment with gabapentin.  Consider epidural steroid injection if the pain gets worse.  Continue with activity modification and regular home exercises.         Boris Michel MD  "

## 2024-02-27 DIAGNOSIS — E78.5 HYPERLIPIDEMIA, UNSPECIFIED HYPERLIPIDEMIA TYPE: ICD-10-CM

## 2024-02-27 DIAGNOSIS — I10 BENIGN ESSENTIAL HYPERTENSION: ICD-10-CM

## 2024-02-27 DIAGNOSIS — E11.9 TYPE 2 DIABETES MELLITUS WITHOUT COMPLICATION, UNSPECIFIED WHETHER LONG TERM INSULIN USE (MULTI): ICD-10-CM

## 2024-02-27 RX ORDER — METFORMIN HYDROCHLORIDE 500 MG/1
500 TABLET ORAL
Qty: 180 TABLET | Refills: 1 | Status: SHIPPED | OUTPATIENT
Start: 2024-02-27 | End: 2024-08-25

## 2024-02-27 RX ORDER — LOSARTAN POTASSIUM 100 MG/1
100 TABLET ORAL DAILY
Qty: 90 TABLET | Refills: 1 | Status: SHIPPED | OUTPATIENT
Start: 2024-02-27 | End: 2024-08-25

## 2024-02-28 RX ORDER — PRAVASTATIN SODIUM 40 MG/1
40 TABLET ORAL
Qty: 90 TABLET | Refills: 1 | Status: SHIPPED | OUTPATIENT
Start: 2024-02-28 | End: 2024-08-26

## 2024-03-04 DIAGNOSIS — M79.604 RIGHT LEG PAIN: ICD-10-CM

## 2024-03-04 RX ORDER — GABAPENTIN 300 MG/1
300 CAPSULE ORAL 3 TIMES DAILY
Qty: 90 CAPSULE | Refills: 3 | Status: SHIPPED | OUTPATIENT
Start: 2024-03-04 | End: 2024-07-02

## 2024-03-18 ENCOUNTER — TELEMEDICINE (OUTPATIENT)
Dept: PRIMARY CARE | Facility: CLINIC | Age: 69
End: 2024-03-18
Payer: MEDICARE

## 2024-03-18 DIAGNOSIS — E53.8 B12 DEFICIENCY: ICD-10-CM

## 2024-03-18 DIAGNOSIS — D75.89 MACROCYTOSIS: ICD-10-CM

## 2024-03-18 DIAGNOSIS — K21.9 GASTROESOPHAGEAL REFLUX DISEASE WITHOUT ESOPHAGITIS: ICD-10-CM

## 2024-03-18 DIAGNOSIS — I10 BENIGN ESSENTIAL HYPERTENSION: ICD-10-CM

## 2024-03-18 DIAGNOSIS — D69.6 THROMBOCYTOPENIA (CMS-HCC): ICD-10-CM

## 2024-03-18 DIAGNOSIS — I10 PRIMARY HYPERTENSION: ICD-10-CM

## 2024-03-18 DIAGNOSIS — E78.5 HYPERLIPIDEMIA, UNSPECIFIED HYPERLIPIDEMIA TYPE: ICD-10-CM

## 2024-03-18 DIAGNOSIS — Z00.00 ROUTINE GENERAL MEDICAL EXAMINATION AT A HEALTH CARE FACILITY: ICD-10-CM

## 2024-03-18 DIAGNOSIS — R93.1 AGATSTON CORONARY ARTERY CALCIUM SCORE BETWEEN 200 AND 399: ICD-10-CM

## 2024-03-18 DIAGNOSIS — E11.9 TYPE 2 DIABETES MELLITUS WITHOUT COMPLICATION, WITHOUT LONG-TERM CURRENT USE OF INSULIN (MULTI): Primary | ICD-10-CM

## 2024-03-18 DIAGNOSIS — E03.9 HYPOTHYROIDISM, UNSPECIFIED TYPE: ICD-10-CM

## 2024-03-18 PROCEDURE — 1124F ACP DISCUSS-NO DSCNMKR DOCD: CPT | Performed by: INTERNAL MEDICINE

## 2024-03-18 PROCEDURE — 1036F TOBACCO NON-USER: CPT | Performed by: INTERNAL MEDICINE

## 2024-03-18 PROCEDURE — 1159F MED LIST DOCD IN RCRD: CPT | Performed by: INTERNAL MEDICINE

## 2024-03-18 PROCEDURE — 99213 OFFICE O/P EST LOW 20 MIN: CPT | Performed by: INTERNAL MEDICINE

## 2024-03-18 PROCEDURE — 4010F ACE/ARB THERAPY RXD/TAKEN: CPT | Performed by: INTERNAL MEDICINE

## 2024-03-18 PROCEDURE — 1160F RVW MEDS BY RX/DR IN RCRD: CPT | Performed by: INTERNAL MEDICINE

## 2024-03-18 RX ORDER — METOPROLOL SUCCINATE 50 MG/1
50 TABLET, EXTENDED RELEASE ORAL DAILY
Qty: 90 TABLET | Refills: 3 | Status: SHIPPED | OUTPATIENT
Start: 2024-03-18 | End: 2025-03-18

## 2024-03-18 RX ORDER — OMEPRAZOLE 40 MG/1
40 CAPSULE, DELAYED RELEASE ORAL DAILY
Qty: 90 CAPSULE | Refills: 1 | Status: SHIPPED | OUTPATIENT
Start: 2024-03-18 | End: 2024-09-14

## 2024-03-18 RX ORDER — LEVOTHYROXINE SODIUM 150 UG/1
150 TABLET ORAL DAILY
Qty: 90 TABLET | Refills: 3 | Status: SHIPPED | OUTPATIENT
Start: 2024-03-18 | End: 2025-03-18

## 2024-03-18 RX ORDER — AMLODIPINE BESYLATE 5 MG/1
5 TABLET ORAL DAILY
Qty: 90 TABLET | Refills: 3 | Status: SHIPPED | OUTPATIENT
Start: 2024-03-18

## 2024-03-18 NOTE — PROGRESS NOTES
Subjective   Patient ID: Raphael Crain is a 68 y.o. male who presents for No chief complaint on file..    HPI   Overall well   #1 LBP- working w/ pain med.   #2 IRBBB   #3 rt chest wall pain- resolved.    #4 hypertension-     #5 diabetes- +exercise--> 4x/week.  Diet fair   #6 mild leukopenia/thrombocytopenia/anemia-   #7 elevated AST-    #8 thyroid  #9 lipids  #10 CAD- by coronary artery calcium scan.   No CP/SOB  #11 chronic prostatitis- follows w/ urology q year.   #12 elevated creatine-normal.  Continue to follow.    #13 ongoing insomnia-    #14 return of rt rib pain- resolved  #15 gerd- s/p EGD,    #16 foot pain- good, f/u ortho.  #17 macrocytosis- better. follow  #18 b12- on b 12 1000 mcg po qd.    #19 dyspepsia/chronic gastritis/IBS- good, stable. f/u GI     Review of Systems    Objective   There were no vitals taken for this visit.    Physical Exam      Lab Results   Component Value Date    WBC 3.8 (L) 08/24/2023    HGB 14.2 08/24/2023    HCT 42.8 08/24/2023     08/24/2023    CHOL 147 04/12/2023    TRIG 70 04/12/2023    HDL 60.1 04/12/2023    ALT 25 08/24/2023    AST 21 02/26/2020     08/24/2023    K 4.5 08/24/2023     08/24/2023    CREATININE 1.09 08/24/2023    BUN 17 08/24/2023    CO2 29 08/24/2023    TSH 0.68 04/12/2023    PSA 1.29 04/12/2023    HGBA1C 6.6 (A) 08/24/2023       Assessment/Plan     #1 LBP-  L5 radiculopathy- f/u  pain med  #2 IRBBB-stress test normal 2013. clinically stable  #3 rt chest wall pain- resolved. Follow-up when necessary  #4 hypertension-  off amlodipine x 1 mth--> bps 140s/80s.  Resume rx, f/u  3 weeks.  #5 diabetes- excellent last test.  Labs now. . reviewed at length.  pending appt eye doctor. Continue excellent diet and exercise program.  Follow-up 4 months  #6 mild leukopenia/thrombocytopenia/anemia-stable retest.  Reviewed again.  At this point, patient agrees to see hematologist consult entered  #7 elevated AST- normal on retest. Continue to follow  #8  thyroid-recheck    #9 lipids- check labs   #10 CAD- by coronary artery calcium scan.  Con't asa/statin  #11 chronic prostatitis- follows w/ urology q year.   #12 elevated creatine-normal.  Continue to follow.    #13 ongoing insomnia- reviewed   #14 return of rt rib pain- resolved  #15 gerd- s/p EGD, will try to reduce to qod PPI.   #16 foot pain- good, f/u ortho.  #17 macrocytosis- better. follow  #18 b12- on b12 1000 mcg po qd. apparent pernicious anemia, continue. Recheck   #19 dyspepsia/chronic gastritis/IBS- good, stable. f/u GI      full code  s/p shingrix 2/2  colo 2015--> 2025  Covid/rsv/flu vaccine fall

## 2024-07-29 ENCOUNTER — LAB (OUTPATIENT)
Dept: LAB | Facility: LAB | Age: 69
End: 2024-07-29
Payer: MEDICARE

## 2024-07-29 ENCOUNTER — OFFICE VISIT (OUTPATIENT)
Dept: URGENT CARE | Facility: CLINIC | Age: 69
End: 2024-07-29
Payer: MEDICARE

## 2024-07-29 VITALS — OXYGEN SATURATION: 98 % | SYSTOLIC BLOOD PRESSURE: 138 MMHG | DIASTOLIC BLOOD PRESSURE: 78 MMHG | HEART RATE: 75 BPM

## 2024-07-29 DIAGNOSIS — M25.561 ACUTE PAIN OF RIGHT KNEE: Primary | ICD-10-CM

## 2024-07-29 DIAGNOSIS — M25.561 ACUTE PAIN OF RIGHT KNEE: ICD-10-CM

## 2024-07-29 LAB — URATE SERPL-MCNC: 4.9 MG/DL (ref 4–7.5)

## 2024-07-29 PROCEDURE — 84550 ASSAY OF BLOOD/URIC ACID: CPT

## 2024-07-29 PROCEDURE — 99203 OFFICE O/P NEW LOW 30 MIN: CPT | Performed by: NURSE PRACTITIONER

## 2024-07-29 PROCEDURE — 3075F SYST BP GE 130 - 139MM HG: CPT | Performed by: NURSE PRACTITIONER

## 2024-07-29 PROCEDURE — 1159F MED LIST DOCD IN RCRD: CPT | Performed by: NURSE PRACTITIONER

## 2024-07-29 PROCEDURE — 36415 COLL VENOUS BLD VENIPUNCTURE: CPT

## 2024-07-29 PROCEDURE — 3078F DIAST BP <80 MM HG: CPT | Performed by: NURSE PRACTITIONER

## 2024-07-29 PROCEDURE — 4010F ACE/ARB THERAPY RXD/TAKEN: CPT | Performed by: NURSE PRACTITIONER

## 2024-07-29 RX ORDER — PREDNISONE 20 MG/1
40 TABLET ORAL DAILY
Qty: 10 TABLET | Refills: 0 | Status: SHIPPED | OUTPATIENT
Start: 2024-07-29 | End: 2024-08-03

## 2024-07-29 NOTE — PROGRESS NOTES
Subjective   Patient ID: Raphael Crain is a 69 y.o. male.    Patient presents with R toyin pain, redness, swelling, instability, feeling of knee buckling. No obvious injury. Sx x 24hrs. patient denies any injury.  He is a diabetic hemoglobin A1c 6.0 only on metformin.  States there is pain and swelling he woke up with it.  He has tried some ibuprofen and ice with some relief.  No history of gout.  Denies any fever, chills, chest pain, shortness of breath, nausea or vomiting      History provided by:  Patient      The following portions of the chart were reviewed this encounter and updated as appropriate:         Review of Systems   All other systems reviewed and are negative.    Objective   Physical Exam  Vitals and nursing note reviewed.   Constitutional:       General: He is not in acute distress.     Appearance: Normal appearance. He is not toxic-appearing.   HENT:      Head: Atraumatic.      Right Ear: External ear normal.      Left Ear: External ear normal.      Nose: Nose normal.      Mouth/Throat:      Mouth: Mucous membranes are moist.   Eyes:      Extraocular Movements: Extraocular movements intact.      Conjunctiva/sclera: Conjunctivae normal.   Cardiovascular:      Rate and Rhythm: Normal rate and regular rhythm.      Heart sounds: No murmur heard.     No gallop.   Pulmonary:      Effort: Pulmonary effort is normal. No respiratory distress.      Breath sounds: Normal breath sounds. No wheezing.   Musculoskeletal:         General: Normal range of motion.      Cervical back: Normal range of motion.      Right knee: Swelling present. No deformity, erythema, ecchymosis, bony tenderness or crepitus. Normal range of motion. Tenderness present over the medial joint line.   Skin:     General: Skin is warm and dry.      Capillary Refill: Capillary refill takes less than 2 seconds.      Coloration: Skin is not jaundiced.   Neurological:      General: No focal deficit present.      Mental Status: He is alert and  oriented to person, place, and time.   Psychiatric:         Mood and Affect: Mood normal.         Behavior: Behavior normal.         Thought Content: Thought content normal.       Procedures    Assessment/Plan   Diagnoses and all orders for this visit:  Acute pain of right knee  -     Uric acid; Future  -     predniSONE (Deltasone) 20 mg tablet; Take 2 tablets (40 mg) by mouth once daily for 5 days.  Follow up on uric acid    Patient disposition: Home

## 2024-08-20 ENCOUNTER — APPOINTMENT (OUTPATIENT)
Dept: PRIMARY CARE | Facility: CLINIC | Age: 69
End: 2024-08-20
Payer: MEDICARE

## 2024-08-20 VITALS
DIASTOLIC BLOOD PRESSURE: 82 MMHG | SYSTOLIC BLOOD PRESSURE: 122 MMHG | WEIGHT: 198.6 LBS | HEIGHT: 71 IN | BODY MASS INDEX: 27.8 KG/M2

## 2024-08-20 DIAGNOSIS — M25.561 CHRONIC PAIN OF RIGHT KNEE: ICD-10-CM

## 2024-08-20 DIAGNOSIS — G89.29 CHRONIC PAIN OF RIGHT KNEE: ICD-10-CM

## 2024-08-20 DIAGNOSIS — E11.9 TYPE 2 DIABETES MELLITUS WITHOUT COMPLICATION, UNSPECIFIED WHETHER LONG TERM INSULIN USE (MULTI): ICD-10-CM

## 2024-08-20 DIAGNOSIS — I10 PRIMARY HYPERTENSION: ICD-10-CM

## 2024-08-20 DIAGNOSIS — I10 BENIGN ESSENTIAL HYPERTENSION: ICD-10-CM

## 2024-08-20 DIAGNOSIS — Z00.00 WELL ADULT EXAM: Primary | ICD-10-CM

## 2024-08-20 DIAGNOSIS — E53.8 B12 DEFICIENCY: ICD-10-CM

## 2024-08-20 PROCEDURE — 1159F MED LIST DOCD IN RCRD: CPT | Performed by: INTERNAL MEDICINE

## 2024-08-20 PROCEDURE — 1170F FXNL STATUS ASSESSED: CPT | Performed by: INTERNAL MEDICINE

## 2024-08-20 PROCEDURE — 3079F DIAST BP 80-89 MM HG: CPT | Performed by: INTERNAL MEDICINE

## 2024-08-20 PROCEDURE — 1160F RVW MEDS BY RX/DR IN RCRD: CPT | Performed by: INTERNAL MEDICINE

## 2024-08-20 PROCEDURE — 99214 OFFICE O/P EST MOD 30 MIN: CPT | Performed by: INTERNAL MEDICINE

## 2024-08-20 PROCEDURE — 4010F ACE/ARB THERAPY RXD/TAKEN: CPT | Performed by: INTERNAL MEDICINE

## 2024-08-20 PROCEDURE — 1036F TOBACCO NON-USER: CPT | Performed by: INTERNAL MEDICINE

## 2024-08-20 PROCEDURE — 3008F BODY MASS INDEX DOCD: CPT | Performed by: INTERNAL MEDICINE

## 2024-08-20 PROCEDURE — 1124F ACP DISCUSS-NO DSCNMKR DOCD: CPT | Performed by: INTERNAL MEDICINE

## 2024-08-20 PROCEDURE — G0439 PPPS, SUBSEQ VISIT: HCPCS | Performed by: INTERNAL MEDICINE

## 2024-08-20 PROCEDURE — 3074F SYST BP LT 130 MM HG: CPT | Performed by: INTERNAL MEDICINE

## 2024-08-20 RX ORDER — METFORMIN HYDROCHLORIDE 500 MG/1
500 TABLET ORAL
Qty: 180 TABLET | Refills: 1 | Status: SHIPPED | OUTPATIENT
Start: 2024-08-20 | End: 2025-02-16

## 2024-08-20 RX ORDER — LOSARTAN POTASSIUM 100 MG/1
100 TABLET ORAL DAILY
Qty: 90 TABLET | Refills: 1 | Status: SHIPPED | OUTPATIENT
Start: 2024-08-20 | End: 2025-02-16

## 2024-08-20 ASSESSMENT — ACTIVITIES OF DAILY LIVING (ADL)
GROCERY_SHOPPING: INDEPENDENT
BATHING: INDEPENDENT
DOING_HOUSEWORK: INDEPENDENT
DRESSING: INDEPENDENT
MANAGING_FINANCES: INDEPENDENT
TAKING_MEDICATION: INDEPENDENT

## 2024-08-20 ASSESSMENT — PATIENT HEALTH QUESTIONNAIRE - PHQ9
2. FEELING DOWN, DEPRESSED OR HOPELESS: NOT AT ALL
SUM OF ALL RESPONSES TO PHQ9 QUESTIONS 1 AND 2: 0
SUM OF ALL RESPONSES TO PHQ9 QUESTIONS 1 AND 2: 0
2. FEELING DOWN, DEPRESSED OR HOPELESS: NOT AT ALL
1. LITTLE INTEREST OR PLEASURE IN DOING THINGS: NOT AT ALL
1. LITTLE INTEREST OR PLEASURE IN DOING THINGS: NOT AT ALL

## 2024-08-20 NOTE — PROGRESS NOTES
"Subjective   Reason for Visit: Raphael Crain is an 69 y.o. male here for a Medicare Wellness visit.     Past Medical, Surgical, and Family History reviewed and updated in chart.    Reviewed all medications by prescribing practitioner or clinical pharmacist (such as prescriptions, OTCs, herbal therapies and supplements) and documented in the medical record.    HPI    Overall well   #1 LBP- working w/ pain med.   #2 IRBBB   #3 rt chest wall pain- resolved.    #4 hypertension-     #5 diabetes- +exercise--> 4x/week.  Diet fair   #6 mild leukopenia/thrombocytopenia/anemia-   #7 elevated AST-    #8 thyroid  #9 lipids  #10 CAD- by coronary artery calcium scan.   No CP/SOB  #11 chronic prostatitis- follows w/ urology q year.   #12 elevated creatine-normal.  Continue to follow.    #13 ongoing insomnia-    #14 return of rt rib pain- resolved  #15 gerd- s/p EGD,    #16 foot pain- good, f/u ortho.  #17 macrocytosis- better. follow  #18 b12- on b 12 1000 mcg po qd.    #19 dyspepsia/chronic gastritis/IBS- good, stable. f/u GI  Patient Care Team:  Keely Bradley MD as PCP - General  Keely Bradley MD as PCP - [employer] PCP     Review of Systems    Objective   Vitals:  /82   Ht 1.791 m (5' 10.5\")   Wt 90.1 kg (198 lb 9.6 oz)   BMI 28.09 kg/m²       Physical Exam  Constitutional:       General: He is not in acute distress.     Appearance: Normal appearance. He is not ill-appearing.   HENT:      Head: Normocephalic and atraumatic.      Right Ear: Tympanic membrane and ear canal normal.      Left Ear: Tympanic membrane and ear canal normal.      Nose: Nose normal.      Mouth/Throat:      Mouth: Mucous membranes are moist.      Pharynx: Oropharynx is clear.   Eyes:      General: No scleral icterus.     Extraocular Movements: Extraocular movements intact.      Conjunctiva/sclera: Conjunctivae normal.      Pupils: Pupils are equal, round, and reactive to light.   Cardiovascular:      Rate and Rhythm: Normal " rate and regular rhythm.      Pulses: Normal pulses.      Heart sounds: No murmur heard.     No friction rub.   Pulmonary:      Effort: Pulmonary effort is normal.      Breath sounds: Normal breath sounds. No rhonchi or rales.   Abdominal:      General: Abdomen is flat. Bowel sounds are normal. There is no distension.      Palpations: Abdomen is soft. There is no mass.      Tenderness: There is no abdominal tenderness.   Musculoskeletal:      Cervical back: Normal range of motion and neck supple.      Right lower leg: No edema.      Left lower leg: No edema.   Skin:     General: Skin is warm.   Neurological:      General: No focal deficit present.      Mental Status: He is alert and oriented to person, place, and time.      Cranial Nerves: No cranial nerve deficit.   Psychiatric:         Mood and Affect: Mood normal.         Behavior: Behavior normal.         Judgment: Judgment normal.         Lab Results   Component Value Date    WBC 3.8 (L) 08/24/2023    HGB 14.2 08/24/2023    HCT 42.8 08/24/2023     08/24/2023    CHOL 147 04/12/2023    TRIG 70 04/12/2023    HDL 60.1 04/12/2023    ALT 25 08/24/2023    AST 21 02/26/2020     08/24/2023    K 4.5 08/24/2023     08/24/2023    CREATININE 1.09 08/24/2023    BUN 17 08/24/2023    CO2 29 08/24/2023    TSH 0.68 04/12/2023    PSA 1.29 04/12/2023    HGBA1C 6.6 (A) 08/24/2023       Assessment/Plan   Problem List Items Addressed This Visit             ICD-10-CM    Benign essential hypertension I10    Type 2 diabetes mellitus (Multi) E11.9     #1 LBP-  L5 radiculopathy- f/u  pain med  #2 IRBBB-stress test normal 2013. clinically stable  #3 rt chest wall pain- resolved. Follow-up when necessary  #4 hypertension-  off amlodipine x 1 mth--> bps 140s/80s.  Resume rx, f/u  3 weeks.  #5 diabetes- excellent last test.  Labs now. . reviewed at length.  pending appt eye doctor. Continue excellent diet and exercise program.  Follow-up 4 months  #6 mild  leukopenia/thrombocytopenia/anemia-stable retest.  Reviewed again.  At this point, patient agrees to see hematologist consult entered  #7 elevated AST- normal on retest. Continue to follow  #8 thyroid-recheck    #9 lipids- check labs   #10 CAD- by coronary artery calcium scan.  Con't asa/statin  #11 chronic prostatitis- follows w/ urology q year.   #12 elevated creatine-normal.  Continue to follow.    #13 ongoing insomnia- reviewed   #14 return of rt rib pain- resolved  #15 gerd- s/p EGD, will try to reduce to qod PPI.   #16 foot pain- good, f/u ortho.  #17 macrocytosis- better. follow  #18 b12- on b12 1000 mcg po qd. apparent pernicious anemia, continue. Recheck   #19 dyspepsia/chronic gastritis/IBS- good, stable. f/u GI  #20 rt knee pain-reviewed.  Seemingly medial soft tissue issue.  No significant arthritis on x-ray.  Painful to palpation.  Consult orthopedist.      full code  s/p shingrix 2/2  colo 2015--> 2025  Covid/rsv/flu vaccine fall

## 2024-08-30 DIAGNOSIS — E78.5 HYPERLIPIDEMIA, UNSPECIFIED HYPERLIPIDEMIA TYPE: ICD-10-CM

## 2024-08-30 RX ORDER — PRAVASTATIN SODIUM 40 MG/1
40 TABLET ORAL
Qty: 90 TABLET | Refills: 1 | Status: SHIPPED | OUTPATIENT
Start: 2024-08-30 | End: 2025-02-26

## 2024-09-09 ENCOUNTER — APPOINTMENT (OUTPATIENT)
Dept: RADIOLOGY | Facility: CLINIC | Age: 69
End: 2024-09-09
Payer: MEDICARE

## 2024-09-09 ENCOUNTER — APPOINTMENT (OUTPATIENT)
Dept: ORTHOPEDIC SURGERY | Facility: CLINIC | Age: 69
End: 2024-09-09
Payer: MEDICARE

## 2024-09-09 VITALS — HEIGHT: 71 IN | BODY MASS INDEX: 27.86 KG/M2 | WEIGHT: 199 LBS

## 2024-09-09 DIAGNOSIS — M25.561 RIGHT KNEE PAIN, UNSPECIFIED CHRONICITY: ICD-10-CM

## 2024-09-09 DIAGNOSIS — M25.561 CHRONIC PAIN OF RIGHT KNEE: ICD-10-CM

## 2024-09-09 DIAGNOSIS — S83.241A ACUTE MEDIAL MENISCUS TEAR OF RIGHT KNEE, INITIAL ENCOUNTER: ICD-10-CM

## 2024-09-09 DIAGNOSIS — G89.29 CHRONIC PAIN OF RIGHT KNEE: ICD-10-CM

## 2024-09-09 PROCEDURE — 99204 OFFICE O/P NEW MOD 45 MIN: CPT | Performed by: STUDENT IN AN ORGANIZED HEALTH CARE EDUCATION/TRAINING PROGRAM

## 2024-09-09 PROCEDURE — 4010F ACE/ARB THERAPY RXD/TAKEN: CPT | Performed by: STUDENT IN AN ORGANIZED HEALTH CARE EDUCATION/TRAINING PROGRAM

## 2024-09-09 PROCEDURE — 1159F MED LIST DOCD IN RCRD: CPT | Performed by: STUDENT IN AN ORGANIZED HEALTH CARE EDUCATION/TRAINING PROGRAM

## 2024-09-09 PROCEDURE — 73564 X-RAY EXAM KNEE 4 OR MORE: CPT | Mod: RT

## 2024-09-09 PROCEDURE — 1036F TOBACCO NON-USER: CPT | Performed by: STUDENT IN AN ORGANIZED HEALTH CARE EDUCATION/TRAINING PROGRAM

## 2024-09-09 PROCEDURE — 1123F ACP DISCUSS/DSCN MKR DOCD: CPT | Performed by: STUDENT IN AN ORGANIZED HEALTH CARE EDUCATION/TRAINING PROGRAM

## 2024-09-09 PROCEDURE — 3008F BODY MASS INDEX DOCD: CPT | Performed by: STUDENT IN AN ORGANIZED HEALTH CARE EDUCATION/TRAINING PROGRAM

## 2024-09-09 PROCEDURE — 1160F RVW MEDS BY RX/DR IN RCRD: CPT | Performed by: STUDENT IN AN ORGANIZED HEALTH CARE EDUCATION/TRAINING PROGRAM

## 2024-09-09 ASSESSMENT — PAIN SCALES - GENERAL: PAINLEVEL_OUTOF10: 5 - MODERATE PAIN

## 2024-09-09 ASSESSMENT — PAIN - FUNCTIONAL ASSESSMENT: PAIN_FUNCTIONAL_ASSESSMENT: 0-10

## 2024-09-09 NOTE — PROGRESS NOTES
PRIMARY CARE PHYSICIAN: Keely Bradley MD  ORTHOPAEDIC FOLLOW-UP: Knee Evaluation    ASSESSMENT & PLAN    Impression: 69 y.o. male with right knee pain, swelling and mechanical symptoms concerning for a displaced medial meniscus tear.    Plan:   I reviewed with the patient the nature of their diagnosis.  I reviewed their imaging studies with them.    Based on the history, physical exam and imaging studies above, the patient's presentation is consistent with the above diagnosis.  I had a long discussion with the patient regarding their presentation and the treatment options.  We discussed initial nonoperative versus operative management options as well as potential further diagnostic imaging.  I reviewed his x-ray findings with him.  He has minimal degenerative changes.  He has persistent right knee pain, swelling and mechanical symptoms concerning for a displaced medial meniscus tear despite fairly extensive nonoperative management.  I recommend therefore that we proceed with an MRI of the right knee to rule out a displaced medial meniscus tear that may require surgical intervention.    Follow-Up: Patient will follow-up after the MRI is completed to review the imaging studies and discuss a treatment plan going forward    At the end of the visit, all questions were answered in full. The patient is in agreement with the plan and recommendations. They will call the office with any questions/concerns.    Note dictated with [employer] software. Completed without full typed error editing and sent to avoid delay.     SUBJECTIVE  CHIEF COMPLAINT:   Chief Complaint   Patient presents with    Right Knee - Pain        HPI: Raphael Crain is a 69 y.o. patient. Raphael Crain complains of right knee pain. Pain is localized medially for the past 2 years. He had a L5 disc herniation which caused pain/numbness in the right leg which has subsided for the most part. No Hx of Sx or trauma to the area. He wears a  knee sleeve. XR done today.  However, he continues to have pain, swelling and mechanical symptoms in the right knee with tenderness along the medial joint line.    REVIEW OF SYSTEMS  Constitutional: See HPI for pain assessment, No significant weight loss, recent trauma  Cardiovascular: No chest pain, shortness of breath  Respiratory: No difficulty breathing, cough  Gastrointestinal: No nausea, vomiting, diarrhea, constipation  Musculoskeletal: Noted in HPI, positive for pain, restricted motion, stiffness  Integumentary: No rashes, easy bruising, redness   Neurological: no numbness or tingling in extremities, no gait disturbances   Psychiatric: No mood changes, memory changes, social issues  Heme/Lymph: no excessive swelling, easy bruising, excessive bleeding  ENT: No hearing changes  Eyes: No vision changes    Past Medical History:   Diagnosis Date    Cholesterolosis of gallbladder     Gallbladder polyp    Decreased white blood cell count, unspecified     Leukopenia    Functional dyspepsia 02/17/2020    Functional dyspepsia    Other specified symptoms and signs involving the circulatory and respiratory systems 01/24/2020    Globus sensation    Personal history of other diseases of the circulatory system     Personal history of coronary atherosclerosis    Personal history of other diseases of the digestive system 01/28/2019    History of gastroesophageal reflux (GERD)    Personal history of other specified conditions 06/12/2014    History of fever    Personal history of other specified conditions 03/09/2016    History of fever    Prediabetes     Pre-diabetes        No Known Allergies     Past Surgical History:   Procedure Laterality Date    OTHER SURGICAL HISTORY  02/20/2014    Cardiac Catheter His Ablation    OTHER SURGICAL HISTORY  01/24/2020    Tonsillectomy    OTHER SURGICAL HISTORY  01/24/2020    Parotidectomy    OTHER SURGICAL HISTORY  09/11/2020    History of prior surgery    OTHER SURGICAL HISTORY  09/02/2022     Endoscopy    OTHER SURGICAL HISTORY  2022    Colonoscopy        Family History   Problem Relation Name Age of Onset    Diabetes Mother      Heart failure Father          Social History     Socioeconomic History    Marital status:      Spouse name: Not on file    Number of children: Not on file    Years of education: Not on file    Highest education level: Not on file   Occupational History    Not on file   Tobacco Use    Smoking status: Former     Current packs/day: 0.00     Average packs/day: 2.0 packs/day for 24.0 years (48.0 ttl pk-yrs)     Types: Cigarettes     Start date:      Quit date:      Years since quittin.7     Passive exposure: Past    Smokeless tobacco: Never   Substance and Sexual Activity    Alcohol use: Yes     Alcohol/week: 14.0 standard drinks of alcohol     Types: 14 Standard drinks or equivalent per week    Drug use: Never    Sexual activity: Not on file   Other Topics Concern    Not on file   Social History Narrative    Not on file     Social Determinants of Health     Financial Resource Strain: Not on file   Food Insecurity: No Food Insecurity (2024)    Hunger Vital Sign     Worried About Running Out of Food in the Last Year: Never true     Ran Out of Food in the Last Year: Never true   Transportation Needs: Not on file   Physical Activity: Not on file   Stress: Not on file   Social Connections: Not on file   Intimate Partner Violence: Not on file   Housing Stability: Unknown (2024)    Housing Stability Vital Sign     Unable to Pay for Housing in the Last Year: No     Number of Places Lived in the Last Year: Not on file     Unstable Housing in the Last Year: No        CURRENT MEDICATIONS:   Current Outpatient Medications   Medication Sig Dispense Refill    amLODIPine (Norvasc) 5 mg tablet Take 1 tablet (5 mg) by mouth once daily. 90 tablet 3    aspirin (Vazalore) 81 mg capsule Take 81 mg by mouth once daily.      cyanocobalamin (Vitamin B-12) 1,000 mcg  "tablet Take 1 tablet (1,000 mcg) by mouth once daily.      gabapentin (Neurontin) 300 mg capsule Take 1 capsule (300 mg) by mouth 3 times a day. 90 capsule 3    levothyroxine (Synthroid, Levoxyl) 150 mcg tablet Take 1 tablet (150 mcg) by mouth once daily. 90 tablet 3    losartan (Cozaar) 100 mg tablet Take 1 tablet (100 mg) by mouth once daily. 90 tablet 1    metFORMIN (Glucophage) 500 mg tablet Take 1 tablet (500 mg) by mouth 2 times daily (morning and late afternoon). 180 tablet 1    metoprolol succinate XL (Toprol-XL) 50 mg 24 hr tablet Take 1 tablet (50 mg) by mouth once daily. 90 tablet 3    omeprazole (PriLOSEC) 40 mg DR capsule Take 1 capsule (40 mg) by mouth once daily. 90 capsule 1    pravastatin (Pravachol) 40 mg tablet Take 1 tablet (40 mg) by mouth once daily. 90 tablet 1     No current facility-administered medications for this visit.        OBJECTIVE    PHYSICAL EXAM      4/17/2023     2:37 PM 8/24/2023     7:43 AM 11/30/2023     9:35 AM 12/21/2023     1:58 PM 1/30/2024     2:09 PM 7/29/2024    11:47 AM 8/20/2024     7:51 AM   Vitals   Systolic 140 130 170 150 144 138 122   Diastolic 90 82 84 85 80 78 82   Heart Rate   71 78 72 75    Temp 36.8 °C (98.3 °F) 36.2 °C (97.1 °F) 36.2 °C (97.1 °F) 36.2 °C (97.2 °F) 35.8 °C (96.4 °F)     Resp    18 16     Height (in) 1.765 m (5' 9.5\")   1.778 m (5' 10\") 1.778 m (5' 10\")  1.791 m (5' 10.5\")   Weight (lb) 193.8 191.4 195.6 190 190 -- 198.6   BMI 28.21 kg/m2 27.86 kg/m2 28.47 kg/m2 27.26 kg/m2 27.26 kg/m2  28.09 kg/m2   BSA (m2) 2.08 m2 2.06 m2 2.09 m2 2.06 m2 2.06 m2  2.12 m2   Visit Report Report Report Report Report Report Report Report      There is no height or weight on file to calculate BMI.    GENERAL: A/Ox3, NAD. Appears healthy, well nourished  PSYCHIATRIC: Mood stable, appropriate memory recall  EYES: EOM intact, no scleral icterus  CARDIOVASCULAR: Palpable peripheral pulses  LUNGS: Breathing non-labored on room air  SKIN: no erythema, rashes, or " ecchymoses     MUSCULOSKELETAL:  Laterality: right Knee Exam  - Skin intact  - No erythema or warmth  - No ecchymosis or soft tissue swelling  - Alignment: neutral  - Palpation: Positive tenderness medial joint line  - ROM: 0 - 0 - 120, patella mobility 1+ medial and lateral with mild crepitus, no apprehension  - Effusion: Small  - Strength: knee extension and flexion 5/5, EHL/PF/DF motor intact  - Stability:        Anterior drawer stable       Posterior drawer stable       Varus/valgus stable       negative Lachman  - positive Graciela's  - Gait: Antalgic  - Hip Exam: flexion to 100+ degrees, full extension, internal/external rotation adequate, and no pain with log roll    NEUROVASCULAR:  - Neurovascular Status: sensation intact to light touch distally, lower extremity motor intact  - Capillary refill brisk at extremities, Bilateral dorsalis pedis pulse 2+    Imaging: Multiple views of the affected right knee(s) demonstrate: No significant osseous normality, no fracture, no significant degenerative change minimal degenerative changes.   X-rays were personally reviewed and interpreted by me.  Radiology reports were reviewed by me as well, if readily available at the time.        Suhail Anderson MD  Attending Surgeon    Sports Medicine Orthopaedic Surgery  [facility] [place]  [facility] [employer]

## 2024-09-25 ENCOUNTER — HOSPITAL ENCOUNTER (OUTPATIENT)
Dept: RADIOLOGY | Facility: CLINIC | Age: 69
Discharge: HOME | End: 2024-09-25
Payer: MEDICARE

## 2024-09-25 DIAGNOSIS — S83.241A ACUTE MEDIAL MENISCUS TEAR OF RIGHT KNEE, INITIAL ENCOUNTER: ICD-10-CM

## 2024-09-25 PROCEDURE — 73721 MRI JNT OF LWR EXTRE W/O DYE: CPT | Mod: RT

## 2024-09-25 PROCEDURE — 73721 MRI JNT OF LWR EXTRE W/O DYE: CPT | Mod: RIGHT SIDE | Performed by: RADIOLOGY

## 2024-09-25 RX ORDER — GADOTERATE MEGLUMINE 376.9 MG/ML
24 INJECTION INTRAVENOUS
Status: DISCONTINUED | OUTPATIENT
Start: 2024-09-25 | End: 2024-09-26 | Stop reason: HOSPADM

## 2024-09-26 DIAGNOSIS — K21.9 GASTROESOPHAGEAL REFLUX DISEASE WITHOUT ESOPHAGITIS: ICD-10-CM

## 2024-09-26 RX ORDER — OMEPRAZOLE 40 MG/1
40 CAPSULE, DELAYED RELEASE ORAL DAILY
Qty: 90 CAPSULE | Refills: 1 | Status: SHIPPED | OUTPATIENT
Start: 2024-09-26 | End: 2025-03-25

## 2024-09-30 ENCOUNTER — APPOINTMENT (OUTPATIENT)
Dept: ORTHOPEDIC SURGERY | Facility: CLINIC | Age: 69
End: 2024-09-30
Payer: MEDICARE

## 2024-09-30 VITALS — BODY MASS INDEX: 27.86 KG/M2 | WEIGHT: 199 LBS | HEIGHT: 71 IN

## 2024-09-30 DIAGNOSIS — S83.241D ACUTE MEDIAL MENISCUS TEAR, RIGHT, SUBSEQUENT ENCOUNTER: ICD-10-CM

## 2024-09-30 DIAGNOSIS — S83.241A ACUTE MEDIAL MENISCUS TEAR OF RIGHT KNEE, INITIAL ENCOUNTER: ICD-10-CM

## 2024-09-30 DIAGNOSIS — M22.41 CHONDROMALACIA OF RIGHT PATELLA: Primary | ICD-10-CM

## 2024-09-30 DIAGNOSIS — M65.961 SYNOVITIS OF RIGHT KNEE: ICD-10-CM

## 2024-09-30 PROCEDURE — 4010F ACE/ARB THERAPY RXD/TAKEN: CPT | Performed by: STUDENT IN AN ORGANIZED HEALTH CARE EDUCATION/TRAINING PROGRAM

## 2024-09-30 PROCEDURE — 1125F AMNT PAIN NOTED PAIN PRSNT: CPT | Performed by: STUDENT IN AN ORGANIZED HEALTH CARE EDUCATION/TRAINING PROGRAM

## 2024-09-30 PROCEDURE — 1160F RVW MEDS BY RX/DR IN RCRD: CPT | Performed by: STUDENT IN AN ORGANIZED HEALTH CARE EDUCATION/TRAINING PROGRAM

## 2024-09-30 PROCEDURE — 1036F TOBACCO NON-USER: CPT | Performed by: STUDENT IN AN ORGANIZED HEALTH CARE EDUCATION/TRAINING PROGRAM

## 2024-09-30 PROCEDURE — 1123F ACP DISCUSS/DSCN MKR DOCD: CPT | Performed by: STUDENT IN AN ORGANIZED HEALTH CARE EDUCATION/TRAINING PROGRAM

## 2024-09-30 PROCEDURE — 99214 OFFICE O/P EST MOD 30 MIN: CPT | Performed by: STUDENT IN AN ORGANIZED HEALTH CARE EDUCATION/TRAINING PROGRAM

## 2024-09-30 PROCEDURE — 1159F MED LIST DOCD IN RCRD: CPT | Performed by: STUDENT IN AN ORGANIZED HEALTH CARE EDUCATION/TRAINING PROGRAM

## 2024-09-30 PROCEDURE — 3008F BODY MASS INDEX DOCD: CPT | Performed by: STUDENT IN AN ORGANIZED HEALTH CARE EDUCATION/TRAINING PROGRAM

## 2024-09-30 ASSESSMENT — PAIN SCALES - GENERAL: PAINLEVEL_OUTOF10: 6

## 2024-09-30 ASSESSMENT — PAIN - FUNCTIONAL ASSESSMENT: PAIN_FUNCTIONAL_ASSESSMENT: 0-10

## 2024-09-30 NOTE — PROGRESS NOTES
PRIMARY CARE PHYSICIAN: Keely Bradley MD  ORTHOPAEDIC FOLLOW-UP: Knee Evaluation    ASSESSMENT & PLAN    Impression: 69 y.o. male with right knee pain, swelling and mechanical symptoms secondary to a displaced medial meniscus tear.    Plan:   I reviewed with the patient the nature of their diagnosis.  I reviewed their imaging studies with them.    Based on the history, physical exam and imaging studies above, the patient's presentation is consistent with the above diagnosis.  I had a long discussion with the patient regarding their presentation and the treatment options.  We discussed initial nonoperative versus operative management options as well as potential further diagnostic imaging.  I reviewed his MRI findings with him.  He has a displaced medial meniscus tear with minimal degenerative changes.  It affects all of his daily activities.  It prevents him from doing things that he enjoys.  He has failed nonoperative management and continues to have pain, swelling and mechanical symptoms in the knee.  After long discussion with the patient, he elected to proceed with surgical intervention form of a right knee arthroscopy, partial medial meniscectomy, intra-articular debridement and synovectomy, chondroplasty.  I thoroughly explained the risks and benefits as well as the expected postoperative timeline for the proposed procedure versus nonoperative management. Risks of this procedure include but are not limited to bleeding, infection, nerve injury, DVT and failure of repair or implant.  The patient expressed understanding and wished to proceed with surgical intervention.  All questions were answered. We will begin the presurgical process and find them a surgical date in a timely fashion that works for them.    The patient will require crutches for postoperative stability and mobility. Additionally, in order to decrease the amount of narcotic pain medication usage and improve their pain control and swelling, I  recommend a cryotherapy machine.    At the end of the visit, all questions were answered in full. The patient is in agreement with the plan and recommendations. They will call the office with any questions/concerns.    Note dictated with [employer] software. Completed without full typed error editing and sent to avoid delay.     SUBJECTIVE  CHIEF COMPLAINT:   Chief Complaint   Patient presents with    Right Knee - Pain, Follow-up        HPI: Raphael Crain is a 69 y.o. patient. Raphael Crain complains of right knee pain. He is following up for his MRI results done 9/25/2024.  He continues to have pain, swelling and mechanical symptoms in the right knee that affects all of his daily activities.  He has failed nonoperative management and would like to discuss definitive treatment options including surgery.    9/9/2024 HPI Visit Info:  Pain is localized medially for the past 2 years. He had a L5 disc herniation which caused pain/numbness in the right leg which has subsided for the most part. No Hx of Sx or trauma to the area. He wears a knee sleeve. XR done today.  However, he continues to have pain, swelling and mechanical symptoms in the right knee with tenderness along the medial joint line.    REVIEW OF SYSTEMS  Constitutional: See HPI for pain assessment, No significant weight loss, recent trauma  Cardiovascular: No chest pain, shortness of breath  Respiratory: No difficulty breathing, cough  Gastrointestinal: No nausea, vomiting, diarrhea, constipation  Musculoskeletal: Noted in HPI, positive for pain, restricted motion, stiffness  Integumentary: No rashes, easy bruising, redness   Neurological: no numbness or tingling in extremities, no gait disturbances   Psychiatric: No mood changes, memory changes, social issues  Heme/Lymph: no excessive swelling, easy bruising, excessive bleeding  ENT: No hearing changes  Eyes: No vision changes    Past Medical History:   Diagnosis Date     Cholesterolosis of gallbladder     Gallbladder polyp    Decreased white blood cell count, unspecified     Leukopenia    Functional dyspepsia 2020    Functional dyspepsia    Other specified symptoms and signs involving the circulatory and respiratory systems 2020    Globus sensation    Personal history of other diseases of the circulatory system     Personal history of coronary atherosclerosis    Personal history of other diseases of the digestive system 2019    History of gastroesophageal reflux (GERD)    Personal history of other specified conditions 2014    History of fever    Personal history of other specified conditions 2016    History of fever    Prediabetes     Pre-diabetes        No Known Allergies     Past Surgical History:   Procedure Laterality Date    OTHER SURGICAL HISTORY  2014    Cardiac Catheter His Ablation    OTHER SURGICAL HISTORY  2020    Tonsillectomy    OTHER SURGICAL HISTORY  2020    Parotidectomy    OTHER SURGICAL HISTORY  2020    History of prior surgery    OTHER SURGICAL HISTORY  2022    Endoscopy    OTHER SURGICAL HISTORY  2022    Colonoscopy        Family History   Problem Relation Name Age of Onset    Diabetes Mother      Heart failure Father          Social History     Socioeconomic History    Marital status:      Spouse name: Not on file    Number of children: Not on file    Years of education: Not on file    Highest education level: Not on file   Occupational History    Not on file   Tobacco Use    Smoking status: Former     Current packs/day: 0.00     Average packs/day: 2.0 packs/day for 24.0 years (48.0 ttl pk-yrs)     Types: Cigarettes     Start date:      Quit date:      Years since quittin.7     Passive exposure: Past    Smokeless tobacco: Never   Substance and Sexual Activity    Alcohol use: Yes     Alcohol/week: 14.0 standard drinks of alcohol     Types: 14 Standard drinks or equivalent per  week    Drug use: Never    Sexual activity: Not on file   Other Topics Concern    Not on file   Social History Narrative    Not on file     Social Determinants of Health     Financial Resource Strain: Not on file   Food Insecurity: No Food Insecurity (1/30/2024)    Hunger Vital Sign     Worried About Running Out of Food in the Last Year: Never true     Ran Out of Food in the Last Year: Never true   Transportation Needs: Not on file   Physical Activity: Not on file   Stress: Not on file   Social Connections: Not on file   Intimate Partner Violence: Not on file   Housing Stability: Unknown (1/30/2024)    Housing Stability Vital Sign     Unable to Pay for Housing in the Last Year: No     Number of Places Lived in the Last Year: Not on file     Unstable Housing in the Last Year: No        CURRENT MEDICATIONS:   Current Outpatient Medications   Medication Sig Dispense Refill    amLODIPine (Norvasc) 5 mg tablet Take 1 tablet (5 mg) by mouth once daily. 90 tablet 3    aspirin (Vazalore) 81 mg capsule Take 81 mg by mouth once daily.      cyanocobalamin (Vitamin B-12) 1,000 mcg tablet Take 1 tablet (1,000 mcg) by mouth once daily.      gabapentin (Neurontin) 300 mg capsule Take 1 capsule (300 mg) by mouth 3 times a day. 90 capsule 3    levothyroxine (Synthroid, Levoxyl) 150 mcg tablet Take 1 tablet (150 mcg) by mouth once daily. 90 tablet 3    losartan (Cozaar) 100 mg tablet Take 1 tablet (100 mg) by mouth once daily. 90 tablet 1    metFORMIN (Glucophage) 500 mg tablet Take 1 tablet (500 mg) by mouth 2 times daily (morning and late afternoon). 180 tablet 1    metoprolol succinate XL (Toprol-XL) 50 mg 24 hr tablet Take 1 tablet (50 mg) by mouth once daily. 90 tablet 3    omeprazole (PriLOSEC) 40 mg DR capsule Take 1 capsule (40 mg) by mouth once daily. 90 capsule 1    pravastatin (Pravachol) 40 mg tablet Take 1 tablet (40 mg) by mouth once daily. 90 tablet 1     No current facility-administered medications for this visit.     "    OBJECTIVE    PHYSICAL EXAM      8/24/2023     7:43 AM 11/30/2023     9:35 AM 12/21/2023     1:58 PM 1/30/2024     2:09 PM 7/29/2024    11:47 AM 8/20/2024     7:51 AM 9/9/2024    11:05 AM   Vitals   Systolic 130 170 150 144 138 122    Diastolic 82 84 85 80 78 82    Heart Rate  71 78 72 75     Temp 36.2 °C (97.1 °F) 36.2 °C (97.1 °F) 36.2 °C (97.2 °F) 35.8 °C (96.4 °F)      Resp   18 16      Height (in)   1.778 m (5' 10\") 1.778 m (5' 10\")  1.791 m (5' 10.5\") 1.791 m (5' 10.5\")   Weight (lb) 191.4 195.6 190 190 -- 198.6 199   BMI 27.86 kg/m2 28.47 kg/m2 27.26 kg/m2 27.26 kg/m2  28.09 kg/m2 28.15 kg/m2   BSA (m2) 2.06 m2 2.09 m2 2.06 m2 2.06 m2  2.12 m2 2.12 m2   Visit Report Report Report Report Report Report Report Report      There is no height or weight on file to calculate BMI.    GENERAL: A/Ox3, NAD. Appears healthy, well nourished  PSYCHIATRIC: Mood stable, appropriate memory recall  EYES: EOM intact, no scleral icterus  CARDIOVASCULAR: Palpable peripheral pulses  LUNGS: Breathing non-labored on room air  SKIN: no erythema, rashes, or ecchymoses     MUSCULOSKELETAL:  Laterality: right Knee Exam  - Skin intact  - No erythema or warmth  - No ecchymosis or soft tissue swelling  - Alignment: neutral  - Palpation: Positive tenderness medial joint line  - ROM: 0 - 0 - 120, patella mobility 1+ medial and lateral with mild crepitus, no apprehension  - Effusion: Small  - Strength: knee extension and flexion 5/5, EHL/PF/DF motor intact  - Stability:        Anterior drawer stable       Posterior drawer stable       Varus/valgus stable       negative Lachman  - positive Graciela's  - Gait: Antalgic  - Hip Exam: flexion to 100+ degrees, full extension, internal/external rotation adequate, and no pain with log roll    NEUROVASCULAR:  - Neurovascular Status: sensation intact to light touch distally, lower extremity motor intact  - Capillary refill brisk at extremities, Bilateral dorsalis pedis pulse 2+    Imaging: MRI of " the right knee reviewed by me demonstrate a complex tear of the medial meniscus with a loose meniscal fragment, minimal degenerative changes of the medial and lateral compartments, chondromalacia patellofemoral joint, tricompartmental synovitis, ligamentous structures intact.  Images were personally reviewed and interpreted by me.  Radiology reports were reviewed by me as well, if readily available at the time.        Suhail Anderson MD  Attending Surgeon    Sports Medicine Orthopaedic Surgery  [facility] [employer]

## 2024-10-01 PROBLEM — S83.241D ACUTE MEDIAL MENISCUS TEAR, RIGHT, SUBSEQUENT ENCOUNTER: Status: ACTIVE | Noted: 2024-09-30

## 2024-10-01 PROBLEM — M22.41 CHONDROMALACIA OF RIGHT PATELLA: Status: ACTIVE | Noted: 2024-09-30

## 2024-10-01 PROBLEM — M65.961 SYNOVITIS OF RIGHT KNEE: Status: ACTIVE | Noted: 2024-09-30

## 2024-10-07 ENCOUNTER — TELEPHONE (OUTPATIENT)
Dept: ORTHOPEDIC SURGERY | Facility: CLINIC | Age: 69
End: 2024-10-07
Payer: MEDICARE

## 2024-10-09 NOTE — DISCHARGE INSTRUCTIONS
Suhail Anderson M.D.   Sports Medicine Orthopaedic Surgery    [facility]  [address] [address]                  [address] [facility] [address]       [address]  [facility], [address]   Phone: [phone]         Phone: [phone]       Phone: [phone]   Fax: [phone]                         Fax: [phone]       Fax: [phone]     After hours phone number: [phone]    AFTER SURGERY     Anesthesia  If you received a nerve block during surgery, you may have numbness or inability to move the limb. Do not be alarmed as this may last 8-36 hours depending upon the amount and type of medication used by the anesthesiologist. Make sure If you are experiencing numbness after 36 hours, please call the office. When the nerve block begins to wear off, you will feel a tingling sensation, like pins and needles. It is important that you start taking the pain medication at that time to ensure that you “stay ahead of the pain.” It is important to take the pain medicine when the pain level is a 4 or 5/10, before it gets too high.    Do not operate heavy machinery, make major decisions, drink alcohol or smoke for 24 hours. Do not drink alcohol while taking pain medications.    Prescribed Medications   Narcotic pain medicine (Oxycodone): The goal of post-operative pain management is pain control, NOT pain elimination. You should expect some pain after surgery - this pain helps you protect itself while it is healing. Constipation, nausea, itching, and drowsiness are side effects of this type of medication. You should take an over-the-counter stool softener (Colace and/or Senna) while taking narcotics to prevent constipation. If you experience itching, over the counter Benadryl may be helpful. Narcotic pain medications often produce drowsiness and it is against the law to operate a vehicle  while taking these medications. If you are taking oxycodone, you should take acetaminophen (Tylenol) around the clock to decrease baseline pain. Do not take Tylenol-containing products while on Percocet or Winfred.   Refill Policy: For concerns over your safety due to the rising opioid addiction epidemic in the [place], refills of your narcotic pain medications will only be provided on a case by case basis. Please use these medications judiciously.    Anti-inflammatory (NSAID) medicine (Naproxen or Mobic): These are both anti-inflammatory and pain relief. Do NOT take this medication if you have had an ulcer in the past unless you have cleared this with your primary care doctor. You should take NSAIDs with food or antacid to reduce the chance of upset stomach. Depending on your surgery, Dr. Anderson may instruct you to avoid these medications.    Anti-nausea medicine (ondansetron/Zofran): sometimes patients experience nausea related to either anesthesia or the narcotic pain medication. If this is the case you will find this medication helpful.    DVT prophylaxis (Aspirin or Eliquis): For most patients, activity alone is sufficient to prevent dangerous blood clots, but in some cases your personal risk profile and/or the type of surgery you have undergone makes it necessary that you take medication to help prevent blood clots. Dr. Anderson will inform you if you are to start one of these medications postoperatively.    Stool softener (Colace and/or Senna): are available over the counter at your local pharmacy and should be taken while you are taking narcotic pain medication to avoid constipation. You should stop taking these medications if you develop diarrhea. Over the counter laxatives may be used if you develop painful constipation.     Diet   Start with clear liquids (water, juice, Gatorade) and light foods (jello, soup, crackers). Progress to normal diet as tolerated if you are not nauseated. Avoid greasy or spicy  foods for the first 24hrs to avoid GI upset. Increase fluid intake to help prevent constipation.    Dressings / Wound Care  You may remove the outer dressing after 3 days and then can shower. (If you have a splint, please leave the splint in place until follow-up.) Do not remove Steri-strips (white stickers) if present over your incisions. Steri-strips may fall off on their own, which is normal. After the bandage has been removed, you may leave the incisions open to air. Alternatively, if you prefer to keep them covered, you may do so with Band-Aids, a light gauze dressing, or a clean ACE wrap. You may shower after the bandage has been removed (3 days), but it is very important that you pat the wounds dry after the shower. It is OK to allow soap and water to run over the wound - DO NOT soak or scrub the wound. Outside of the shower, keep your incision clean and dry until your first postoperative visit, approximately 10-14 days after surgery. You may remove your sling or brace to shower, unless otherwise instructed. As your balance may be affected by recent surgery, we recommend placing a plastic chair in the shower to help prevent falls. Do NOT take baths, go into a pool, or soak the operative site until approved by Dr. Anderson.    Bracing / Physical Therapy   If you were given one, make sure you wear sling or brace at all times until your follow-up with Dr. Anderson. Only remove your sling or brace for physical therapy, home exercises, and hygiene. These are typically used for 4-6 weeks after surgery in order to protect the healing of the tissue.     Physical therapy is just as important to your recovery as the actual operation! If you were given a prescription for physical therapy, make sure you go to your appointments and do your exercises daily at home (especially motion exercises).     Ice is a very important part of your recovery. It helps reduce inflammation and improves pain control. You should ice a few times  each day for 20-30 minutes at a time. Please make sure there is something under the ice (clean towel, cloth, T-shirt) so that the ice doesn't directly contact your skin. If you ordered a commercially available ice machine (optional) and a compression setting is available, you should use LOW or no compression during the first 5 days. After that, you may increase compression setting as tolerated. If the compression is bothering you then do not use compression.    Driving / Travel  Ultimately, it is your judgment to decide when you are safe to drive, but if you are at all unsure, do not risk your life or someone else's. As a general guideline, you will not be able to drive for 4-6 weeks after surgery. You should certainly not drive while on narcotics pain medication or while in a brace or sling.     Avoid flights and long distance traveling for 6 weeks after surgery. It is important to discuss your travel plans with Dr. Anderson, as additional medications may need to be prescribed to help prevent blood clots if certain travel is unavoidable.     Return to Work or School   Your return to work will depend on what surgery was done and what type of work you do. Please note that these are general guidelines, and there may be modifications based on your unique situation. Typically, you may return to sedentary work or school 3-7 days after surgery if pain is tolerable and you are no longer requiring narcotic pain medication. In conjunction with your input, Dr. Anderson will determine when you may return to more physically rigorous demands.     If you had Knee or Hip Surgery   If your surgery involves a ligament reconstruction or tendon repair, you will typically be prescribed crutches for at least the first few days until pain and the postoperative protocol allows you to fully bear weight and also wear a brace for 4-6 weeks. If cartilage surgery or meniscus repair is performed, you may be on crutches for 6 weeks. Individual  rehabilitation guidelines will vary based upon the unique situation and surgery of every patient, but take these general guidelines into account when planning return to work.     If you had Shoulder Surgery   If your surgery involves a repair (rotator cuff repair, labral repair), you will have a sling on for six weeks after surgery. If you have a sling, you will need to wear it all day. Please wear the sling at all times except for bathing for the first 2 weeks minimum. As long as you can abide by the restrictions, you can return to work when you feel like you can do so safely. However, you will need to take into consideration driving and activities related to your job. You may be able to safely loosen it if you are able to keep your arm supported. Please understand that you will NOT be able to work with your arm away from your body, above shoulder level, or use your arm against gravity for approximately 8 weeks. For jobs that require physical labor, you may require four months or more to return to work. If your surgery does NOT involve a repair (subacromial decompression, distal clavicle excision, capsular release), then you will be in a sling for only a few days after surgery. When comfortable, you may return to work when ready to conduct normal activities of your job. Remember that you may be on narcotic pain medications and these should be discontinued prior to your return to work. For jobs that require physical labor, you may require 6 weeks or more to return to work.     If you had Elbow or Wrist Surgery   If your surgery involves a repair or reconstruction, you will have a splint and sling on followed by a brace for four to six weeks after surgery. As long as you can abide by the restrictions, you can return to work when you feel like you can do so safely. However, you will need to take into consideration driving and activities related to your job. If you have a sling, you will need to wear it all day unless  otherwise instructed. You may be able to safely loosen it if you are able to keep your arm supported. For jobs that require physical labor, you may require four months or more to return to work.    If you had Ankle Surgery   If your surgery involves a repair or reconstruction, you will have a splint followed by a walking boot for four to six weeks after surgery. As long as you can abide by the restrictions, you can return to work when you feel like you can do so safely. However, you will need to take into consideration driving and activities related to your job. For jobs that require physical labor, you may require four months or more to return to work.    Normal Sensations and Findings after Surgery:   PAIN: We do everything possible to make your pain/discomfort level tolerable, but some amount of pain is to be expected.   WARMTH: Mild warmth around the operative site is normal for up to 3 weeks.   REDNESS: Small amount of redness where the sutures enter the skin is normal. If redness worsens or spreads it is important that you contact the office.   DRAINAGE: A small amount is normal for the first 48-72 hours. If wounds continue to drain after this time (requiring multiple gauze changes per day), please contact the office.   NUMBNESS: Around the incision is common.   BRUISING: Is common and often tracks down the arm or leg due to gravity and results in an alarming appearance, but is common and will resolve with time.   FEVER: Low-grade fevers (less than 101.5°F) are common during the first week after surgery. You should drink plenty of fluids and breathe deeply.   CONSTIPATION: Post-operative pain medications as well as immobility can lead to constipation. Please consider taking an over the counter stool softener such as Colace and/or Senna if you experience constipation or if you have a history of constipation.    Follow-Up   A Follow-up appointment should be arranged for 10-14 days after surgery. If one has not  been provided, please call the office to schedule.       NOTIFY US IMMEDIATELY FOR ANY OF THE FOLLOWING:   Most orthopedic surgical procedures are uneventful. However, complications can occur. The following are things to be aware of in the immediate postoperative period.   FEVER - Temperature rises above 101.5°F or associated chills/sweats   WOUND - If you notice drainage more than 4 days after surgery, if the drainage turns yellows and foul smelling, if you need to change gauze multiple times per day, or if sutures become loose.   CARDIOVASCULAR - Chest pain, shortness of breath, palpitations, or fainting spells must be taken seriously. Go to the emergency room (or call 911) immediately for evaluation.   BLOOD CLOTS - Orthopaedic surgery patients are at risk for blood clots. While the risk is higher for lower extremity surgery, even those who have undergone upper extremity surgery are at an increased risk. Please notify Dr. Anderson if you or someone in your family has had blood clots or any type of known clotting disorder. Signs of blood clots may include calf pain or cramping, diffuse swelling in the leg and foot, or chest pain and shortness of breath. Please call the office or go to the hospital if you recognize any of these symptoms.   NAUSEA - If you have severe vomiting, diarrhea, or constipation, or cannot keep any liquid down   URINARY RETENTION - If you cannot urinate the night after surgery, please go to the Emergency Room.

## 2024-10-10 ENCOUNTER — PRE-ADMISSION TESTING (OUTPATIENT)
Dept: PREADMISSION TESTING | Facility: HOSPITAL | Age: 69
End: 2024-10-10
Payer: MEDICARE

## 2024-10-10 ENCOUNTER — LAB (OUTPATIENT)
Dept: LAB | Facility: LAB | Age: 69
End: 2024-10-10
Payer: MEDICARE

## 2024-10-10 VITALS
RESPIRATION RATE: 16 BRPM | SYSTOLIC BLOOD PRESSURE: 139 MMHG | BODY MASS INDEX: 28.59 KG/M2 | OXYGEN SATURATION: 94 % | WEIGHT: 199.74 LBS | DIASTOLIC BLOOD PRESSURE: 80 MMHG | TEMPERATURE: 98.1 F | HEIGHT: 70 IN

## 2024-10-10 DIAGNOSIS — E03.9 HYPOTHYROIDISM, UNSPECIFIED TYPE: ICD-10-CM

## 2024-10-10 DIAGNOSIS — R73.9 ELEVATED BLOOD SUGAR: ICD-10-CM

## 2024-10-10 DIAGNOSIS — S83.241A ACUTE MEDIAL MENISCUS TEAR OF RIGHT KNEE, INITIAL ENCOUNTER: ICD-10-CM

## 2024-10-10 DIAGNOSIS — Z01.818 ENCOUNTER FOR PREADMISSION TESTING: Primary | ICD-10-CM

## 2024-10-10 DIAGNOSIS — Z01.818 ENCOUNTER FOR PREADMISSION TESTING: ICD-10-CM

## 2024-10-10 LAB
ALBUMIN SERPL BCP-MCNC: 4.4 G/DL (ref 3.4–5)
ALP SERPL-CCNC: 56 U/L (ref 33–136)
ALT SERPL W P-5'-P-CCNC: 27 U/L (ref 10–52)
ANION GAP SERPL CALC-SCNC: 12 MMOL/L (ref 10–20)
AST SERPL W P-5'-P-CCNC: 22 U/L (ref 9–39)
BILIRUB SERPL-MCNC: 0.4 MG/DL (ref 0–1.2)
BUN SERPL-MCNC: 20 MG/DL (ref 6–23)
CALCIUM SERPL-MCNC: 9.3 MG/DL (ref 8.6–10.3)
CHLORIDE SERPL-SCNC: 107 MMOL/L (ref 98–107)
CO2 SERPL-SCNC: 29 MMOL/L (ref 21–32)
CREAT SERPL-MCNC: 1.18 MG/DL (ref 0.5–1.3)
EGFRCR SERPLBLD CKD-EPI 2021: 66 ML/MIN/1.73M*2
ERYTHROCYTE [DISTWIDTH] IN BLOOD BY AUTOMATED COUNT: 13.4 % (ref 11.5–14.5)
EST. AVERAGE GLUCOSE BLD GHB EST-MCNC: 160 MG/DL
GLUCOSE SERPL-MCNC: 193 MG/DL (ref 74–99)
HBA1C MFR BLD: 7.2 %
HCT VFR BLD AUTO: 40.8 % (ref 41–52)
HGB BLD-MCNC: 13.4 G/DL (ref 13.5–17.5)
MCH RBC QN AUTO: 31.2 PG (ref 26–34)
MCHC RBC AUTO-ENTMCNC: 32.8 G/DL (ref 32–36)
MCV RBC AUTO: 95 FL (ref 80–100)
NRBC BLD-RTO: ABNORMAL /100{WBCS}
PLATELET # BLD AUTO: 146 X10*3/UL (ref 150–450)
POTASSIUM SERPL-SCNC: 4.2 MMOL/L (ref 3.5–5.3)
PROT SERPL-MCNC: 6.8 G/DL (ref 6.4–8.2)
RBC # BLD AUTO: 4.3 X10*6/UL (ref 4.5–5.9)
SODIUM SERPL-SCNC: 144 MMOL/L (ref 136–145)
TSH SERPL-ACNC: 1.09 MIU/L (ref 0.44–3.98)
WBC # BLD AUTO: 4.4 X10*3/UL (ref 4.4–11.3)

## 2024-10-10 PROCEDURE — 83036 HEMOGLOBIN GLYCOSYLATED A1C: CPT

## 2024-10-10 PROCEDURE — 99203 OFFICE O/P NEW LOW 30 MIN: CPT | Performed by: NURSE PRACTITIONER

## 2024-10-10 PROCEDURE — 93005 ELECTROCARDIOGRAM TRACING: CPT

## 2024-10-10 PROCEDURE — 85027 COMPLETE CBC AUTOMATED: CPT

## 2024-10-10 PROCEDURE — 80053 COMPREHEN METABOLIC PANEL: CPT

## 2024-10-10 PROCEDURE — 93010 ELECTROCARDIOGRAM REPORT: CPT | Performed by: INTERNAL MEDICINE

## 2024-10-10 PROCEDURE — 84443 ASSAY THYROID STIM HORMONE: CPT

## 2024-10-10 PROCEDURE — 36415 COLL VENOUS BLD VENIPUNCTURE: CPT

## 2024-10-10 ASSESSMENT — DUKE ACTIVITY SCORE INDEX (DASI)
DASI METS SCORE: 8
CAN YOU WALK INDOORS, SUCH AS AROUND YOUR HOUSE: YES
TOTAL_SCORE: 42.7
CAN YOU WALK A BLOCK OR TWO ON LEVEL GROUND: YES
CAN YOU PARTICIPATE IN STRENOUS SPORTS LIKE SWIMMING, SINGLES TENNIS, FOOTBALL, BASKETBALL, OR SKIING: NO
CAN YOU RUN A SHORT DISTANCE: NO
CAN YOU DO LIGHT WORK AROUND THE HOUSE LIKE DUSTING OR WASHING DISHES: YES
CAN YOU HAVE SEXUAL RELATIONS: YES
CAN YOU CLIMB A FLIGHT OF STAIRS OR WALK UP A HILL: YES
CAN YOU PARTICIPATE IN MODERATE RECREATIONAL ACTIVITIES LIKE GOLF, BOWLING, DANCING, DOUBLES TENNIS OR THROWING A BASEBALL OR FOOTBALL: YES
CAN YOU DO MODERATE WORK AROUND THE HOUSE LIKE VACUUMING, SWEEPING FLOORS OR CARRYING GROCERIES: YES
CAN YOU TAKE CARE OF YOURSELF (EAT, DRESS, BATHE, OR USE TOILET): YES
CAN YOU DO HEAVY WORK AROUND THE HOUSE LIKE SCRUBBING FLOORS OR LIFTING AND MOVING HEAVY FURNITURE: YES
CAN YOU DO YARD WORK LIKE RAKING LEAVES, WEEDING OR PUSHING A MOWER: YES

## 2024-10-10 ASSESSMENT — PAIN - FUNCTIONAL ASSESSMENT: PAIN_FUNCTIONAL_ASSESSMENT: 0-10

## 2024-10-10 ASSESSMENT — PAIN SCALES - GENERAL: PAINLEVEL_OUTOF10: 6

## 2024-10-10 ASSESSMENT — PAIN DESCRIPTION - DESCRIPTORS: DESCRIPTORS: THROBBING

## 2024-10-10 NOTE — CPM/PAT H&P
CPM/PAT Evaluation       Name: Raphael Crain (Raphael Crain)  /Age: 1955/69 y.o.     In-Person       Chief Complaint: right knee pain    Patient is an alert and oriented 69 year old male scheduled for a right knee arthroscopy on 10/15/24  with Dr. Anderson. PMHX includes Type 2 DM, dyslipidemia, GERD, HTN, hypothyroidism and mild leukopenia/thrombocytopenia/anemia . Presents to [facility] PAT today for perioperative risk stratification and optimization.     Medication Documentation Review Audit       Reviewed by Yuni Foss RN (Registered Nurse) on 10/10/24 at 1527      Medication Order Taking? Sig Documenting Provider Last Dose Status   amLODIPine (Norvasc) 5 mg tablet [id number] Yes Take 1 tablet (5 mg) by mouth once daily. Keely Bradley MD 10/9/2024 Active   aspirin (Vazalore) 81 mg capsule [id number] Yes Take 81 mg by mouth once daily. Historical Provider, MD 10/10/2024 Active   cyanocobalamin (Vitamin B-12) 1,000 mcg tablet [id number] Yes Take 1 tablet (1,000 mcg) by mouth once daily. Historical Provider, MD 10/10/2024 Active   gabapentin (Neurontin) 300 mg capsule [id number] Yes Take 1 capsule (300 mg) by mouth 3 times a day.   Patient taking differently: Take 1 capsule (300 mg) by mouth 3 times a day. Takes only 2 x daily    Boris Michel MD 10/10/2024 Active   levothyroxine (Synthroid, Levoxyl) 150 mcg tablet [id number] Yes Take 1 tablet (150 mcg) by mouth once daily. Keely Bradley MD 10/10/2024 Active   losartan (Cozaar) 100 mg tablet [id number] Yes Take 1 tablet (100 mg) by mouth once daily. Keely Bradley MD 10/9/2024 Active   metFORMIN (Glucophage) 500 mg tablet [id number] Yes Take 1 tablet (500 mg) by mouth 2 times daily (morning and late afternoon). Keely Bradley MD 10/10/2024 Active   metoprolol succinate XL (Toprol-XL) 50 mg 24 hr tablet [id number] Yes Take 1 tablet (50 mg) by mouth once daily. Keely Bradley MD 10/10/2024 Active   omeprazole (PriLOSEC) 40 mg DR capsule [id number] Yes Take 1  capsule (40 mg) by mouth once daily. Keely Bradley MD 10/10/2024 Active   pravastatin (Pravachol) 40 mg tablet [id number] Yes Take 1 tablet (40 mg) by mouth once daily. Keely Bradley MD 10/9/2024 Active                      Review of Systems   Constitutional: Negative for chills, decreased appetite, diaphoresis, fever and malaise/fatigue.   Eyes:  Negative for blurred vision and double vision.   Cardiovascular:  Negative for chest pain, claudication, cyanosis, dyspnea on exertion, irregular heartbeat, leg swelling, near-syncope and palpitations.   Respiratory:  Negative for cough, hemoptysis, shortness of breath and wheezing.    Endocrine: Negative for cold intolerance, heat intolerance, polydipsia, polyphagia and polyuria.   Gastrointestinal:  Negative for abdominal pain, constipation, diarrhea, dysphagia, nausea and vomiting.   Genitourinary:  Negative for bladder incontinence, dysuria, hematuria, incomplete emptying, nocturia, frequency, pelvic pain and urgency.   Neurological:  Negative for headaches, light-headedness, paresthesias, sensory change and weakness.   Psychiatric/Behavioral:  Negative for altered mental status.    Musculoskeletal: Negative for myalgias, arthralgias     Vitals and nursing note reviewed.     Physical exam  Constitutional:       Appearance: Normal appearance. He is Overweight.   HENT:      Head: Normocephalic and atraumatic.      Mouth/Throat:      Mouth: Mucous membranes are moist.      Pharynx: Oropharynx is clear.   Eyes:      Extraocular Movements: Extraocular movements intact.      Conjunctiva/sclera: Conjunctivae normal.      Pupils: Pupils are equal, round, and reactive to light.   Cardiovascular:      PMI at left midclavicular line. Normal rate. Regular rhythm. Normal S1. Normal S2.       Murmurs: There is no murmur.      No gallop.  No click. No rub.       No audible carotid bruit     No lower extremity edema on exam  Pulmonary:      Effort: Pulmonary effort is normal.       Breath sounds: Normal breath sounds.   Abdominal:      General: Abdomen is flat. Bowel sounds are normal.      Palpations: Abdomen is soft and non-tender  Musculoskeletal:      Cervical back: Normal range of motion and neck supple.   Skin:     General: Skin is warm and dry.      Capillary Refill: Capillary refill takes less than 2 seconds.   Neurological:      General: No focal deficit present.      Mental Status: He is alert and oriented to person, place, and time. Mental status is at baseline.   Psychiatric:         Mood and Affect: Mood normal.         Behavior: Behavior normal.         Thought Content: Thought content normal.         Judgment: Judgment normal.     Vitals and nursing note reviewed. Physical exam within normal limits.     Assessment & Plan:    Neuro:  No diagnosis or significant findings on chart review or clinical presentation and evaluation.     HEENT/Airway:  No diagnosis or significant findings on chart review or clinical presentation and evaluation.   STOP-BANG Score- 4 points moderaterisk for RULA    Mallampati::  II    TM distance::  >3 FB    Neck ROM::  Full  Dentures-denies  Crowns-denies  Implants-denies    Cardiovascular:  History of HTN and dyslipidemia. Per Dr. Keely Bradley progress note on 8/20/24 pt has history of IRBBB. However all EKG in epic show NSR and no right bbb. I will be faxing over EKG to Dr. Kendal Bradley office.   METS: 8  RCRI: 0 points, 3.9%  risk for postoperative MACE   TEJA: 0.2% risk for postoperative MACE  EKG -normal sinus rhythm, RBBB Rate- 66   Pulmonary:  No diagnosis or significant findings on chart review or clinical presentation and evaluation.   ARISCAT: <26 points, 1.6% risk of in-hospital postoperative pulmonary complication  PRODIGY: High risk for opioid induced respiratory depression  Smoking History-He has never smoked.  Discussed smoking cessation and deep breathing handout given    Renal/Urinary:  No diagnosis or significant findings on chart review or  clinical presentation and evaluation, however, the patient is at increased risk of perioperative renal complications secondary to age>/= 56, male sex, HTN, and diabetes. Preventative measures include BP monitoring, medication compliance, and hydration management.   CMP-Pending    Endocrine:  No diagnosis or significant findings on chart review or clinical presentation and evaluation. History of Type 2 DM and hypothyroidism.  [id number]  TSH - pending    Hematologic/Immunology:  No diagnosis or significant findings on chart review or clinical presentation and evaluation.  The patient is not a Jehovah’s witness and will accept blood and blood products if medically indicated.   History of previous blood transfusions No  CBC-Pending  Caprini Score 5, patient at High for postoperative DVT. Pt supplied education/VTE handout  Anticoagulation use: No     Gastrointestinal:   No diagnosis or significant findings on chart review or clinical presentation and evaluation. History of GERD.  Recreational drug use: Drug use No  Alcohol use glass of wine with dinner    Infectious disease:   No diagnosis or significant findings on chart review or clinical presentation and evaluation.     Musculoskeletal:   No diagnosis or significant findings on chart review or clinical presentation and evaluation.   JHFRAT score- 7 points. moderate risk for falls    Anesthesia:  ASA 2 - Patient with mild systemic disease with no functional limitations  Anticipated anesthesia-General  History of General anesthesia- yes  Complications- No anesthesia complications  No family history of anesthesia complications    Abnormalities noted on PAT evaluation: No    Labs & Imaging ordered:  CBC, CMP, HBA1C, TSH, EKG  EKG and medical clearance form faxed to Dr. Keely Bradley office  Nickel/metal allergy-negative  Shellfish allergy-negative    Overall, patient Moderate Risk for the scheduled Moderate Risk surgery. Discussed with patient medication instructions,  NPO guidelines, and any questions or concerns.     Face to face time- 20 minutes

## 2024-10-10 NOTE — PREPROCEDURE INSTRUCTIONS
Medication List            Accurate as of October 10, 2024  3:34 PM. Always use your most recent med list.                amLODIPine 5 mg tablet  Commonly known as: Norvasc  Take 1 tablet (5 mg) by mouth once daily.  Medication Adjustments for Surgery: Take last dose 1 day (24 hours) before surgery     cyanocobalamin 1,000 mcg tablet  Commonly known as: Vitamin B-12  Additional Medication Adjustments for Surgery: Take last dose 7 days before surgery     gabapentin 300 mg capsule  Commonly known as: Neurontin  Take 1 capsule (300 mg) by mouth 3 times a day.  Medication Adjustments for Surgery: Take/Use as prescribed     levothyroxine 150 mcg tablet  Commonly known as: Synthroid, Levoxyl  Take 1 tablet (150 mcg) by mouth once daily.  Medication Adjustments for Surgery: Take/Use as prescribed     losartan 100 mg tablet  Commonly known as: Cozaar  Take 1 tablet (100 mg) by mouth once daily.  Medication Adjustments for Surgery: Take last dose 1 day (24 hours) before surgery     metFORMIN 500 mg tablet  Commonly known as: Glucophage  Take 1 tablet (500 mg) by mouth 2 times daily (morning and late afternoon).  Medication Adjustments for Surgery: Do Not take on the morning of surgery     metoprolol succinate XL 50 mg 24 hr tablet  Commonly known as: Toprol-XL  Take 1 tablet (50 mg) by mouth once daily.  Medication Adjustments for Surgery: Take/Use as prescribed     omeprazole 40 mg DR capsule  Commonly known as: PriLOSEC  Take 1 capsule (40 mg) by mouth once daily.  Medication Adjustments for Surgery: Take/Use as prescribed     pravastatin 40 mg tablet  Commonly known as: Pravachol  Take 1 tablet (40 mg) by mouth once daily.  Medication Adjustments for Surgery: Take/Use as prescribed     Vazalore 81 mg capsule  Generic drug: aspirin  Additional Medication Adjustments for Surgery: Take last dose 7 days before surgery                        .NPO Instructions:     Do not eat any food or drink liquid after midnight the night  before your surgery/procedure.  Additional Instructions:      Seven/Six Days before Surgery:  Review your medication instructions, stop indicated medications  Five Days before Surgery:  Review your medication instructions, stop indicated medications  Begin using your Hibiclens  Three Days before Surgery:  Review your medication instructions, stop indicated medications  The Day before Surgery:  No smoking or alcohol use 24 hours before surgery  Start using 0.12% CHG mouthwash  Review your medication instructions, stop indicated medications  You will be contacted regarding the time of your arrival to facility and surgery time  Do not eat any food after Midnight  Day of Surgery:  Review your medication instructions, take indicated medications  If you have diabetes, please check your fasting blood sugar upon awakening.  If fasting blood sugar is <80 mg/dl, drink 100 ml of apple juice, time limit of 2 hours before  Wear  comfortable loose fitting clothing  Do not use moisturizers, creams, lotions or perfume  All jewelry and valuables should be left at home     CONTACT SURGEON'S OFFICE IF YOU DEVELOP:  * Fever = 100.4 F   * New respiratory symptoms (e.g. cough, shortness of breath, respiratory distress, sore throat)  * Recent loss of taste or smell  *Flu like symptoms such as headache, fatigue or gastrointestinal symptoms  * You develop any open sores, shingles, burning or painful urination   AND/OR:  * You no longer wish to have the surgery.  * Any other personal circumstances change that may lead to the need to cancel or defer this surgery.  *You were admitted to any hospital within one week of your planned procedure.     SMOKING:  *Quitting smoking can make a huge difference to your health and recovery from surgery.    *If you need help with quitting, call 8-800-QUIT-NOW.     THE DAY BEFORE SURGERY:  *Do not eat any food after midnight the night before your surgery.   SURGICAL TIME:  *You will be contacted between 2  p.m. and 3 p.m. the business day before your surgery with your arrival time.  *If you haven't received a call by 3pm, call [phone]  *Scheduled surgery times may change and you will be notified if this occurs-check your personal voicemail for any updates.     ON THE MORNING OF SURGERY:  *Wear comfortable, loose fitting clothing.   *Do not use moisturizers, creams, lotions or perfume.  *All jewelry and valuables should be left at home.  *Prosthetic devices such as contact lenses, hearing aids, dentures, eyelash extensions, hairpins and body piercing must be removed before surgery.     BRING WITH YOU:  *Photo ID and insurance card  *Current list of medications and allergies  *Pacemaker/Defibrillator/Heart stent cards  *CPAP machine and mask  *Slings/splints/crutches  *Copy of your complete Advanced Directive/DHPOA-if applicable  *Neurostimulator implant remote     PARKING AND ARRIVAL:  *Check in at the Main Entrance desk and let them know you are here for surgery.     IF YOU ARE HAVING OUTPATIENT/SAME DAY SURGERY:  *A responsible adult MUST accompany you at the time of discharge and stay with you for 24 hours after your surgery.  *You may NOT drive yourself home after surgery.  *You may use a taxi or ride sharing service ([employer], [employer]) to return home ONLY if you are accompanied by a friend or family member.  *Instructions for resuming your medications will be provided by your surgeon.     Thank you for coming to Pre Admission testing.      If I have prescribe medication please don't forget to  at your pharmacy.      Any questions about today's visit call [phone] and leave a message in the general mailbox.     Patient instructed to ambulate as soon as possible postoperatively to decrease thromboembolic risk.     Amrita DENSON-CNP     Thank you for visiting [facility].  If you have any changes to your health condition, please call the surgeons office to alert them and  give them details of your symptoms.        Preoperative Fasting Guidelines     Why must I stop eating and drinking near surgery time?  With sedation, food or liquid in your stomach can enter your lungs causing serious complications  Increases nausea and vomiting     When do I need to stop eating and drinking before my surgery?  Do not eat any food after midnight the night before your surgery/procedure.        Additional Instructions:      The Day before Surgery:  -Review your medication instructions, stop indicated medications  -You will be contacted in the evening regarding the time of your arrival to facility and surgery time     Day of Surgery:  -Review your medication instructions, take indicated medications  -Wear comfortable loose fitting clothing  -Do not use moisturizers, creams, lotions or perfume  -All jewelry and valuables should be left at home                   Preoperative Brain Exercises     What are brain exercises?  A brain exercise is any activity that engages your thinking (cognitive) skills.     What types of activities are considered brain exercises?  Jigsaw puzzles, crossword puzzles, word jumble, memory games, word search, and many more.  Many can be found free online or on your phone via a mobile jerry.     Why should I do brain exercises before my surgery?  More recent research has shown brain exercise before surgery can lower the risk of postoperative delirium (confusion) which can be especially important for older adults.  Patients who did brain exercises for 5 to 10 hours the days before surgery, cut their risk of postoperative delirium in half up to 1 week after surgery.                         [facility]     Preoperative Deep Breathing Exercises     Why it is important to do deep breathing exercises before my surgery?  Deep breathing exercises strengthen your breathing muscles.  This helps you to recover after your surgery and decreases the chance of breathing  complications.        How are the deep breathing exercises done?  Sit straight with your back supported.  Breathe in deeply and slowly through your nose. Your lower rib cage should expand and your abdomen may move forward.  Hold that breath for 3 to 5 seconds.  Breathe out through pursed lips, slowly and completely.  Rest and repeat 10 times every hour while awake.  Rest longer if you become dizzy or lightheaded.                      [facility]     Preoperative Deep Breathing Exercises     Why it is important to do deep breathing exercises before my surgery?  Deep breathing exercises strengthen your breathing muscles.  This helps you to recover after your surgery and decreases the chance of breathing complications.        How are the deep breathing exercises done?  Sit straight with your back supported.  Breathe in deeply and slowly through your nose. Your lower rib cage should expand and your abdomen may move forward.  Hold that breath for 3 to 5 seconds.  Breathe out through pursed lips, slowly and completely.  Rest and repeat 10 times every hour while awake.  Rest longer if you become dizzy or lightheaded.        Patient Information: Incentive Spirometer  What is an incentive spirometer?  An incentive spirometer is a device used before and after surgery to “exercise” your lungs.  It helps you to take deeper breaths to expand your lungs.  Below is an example of a basic incentive spirometer.  The device you receive may differ slightly but they all function the same.    Why do I need to use an incentive spirometer?  Using your incentive spirometer prepares your lungs for surgery and helps prevent lung problems after surgery.  How do I use my incentive spirometer?  When you're using your incentive spirometer, make sure to breathe through your mouth. If you breathe through your nose, the incentive spirometer won't work properly. You can hold your nose if you have trouble.  If you feel dizzy  at any time, stop and rest. Try again at a later time.  Follow the steps below:  Set up your incentive spirometer, expand the flexible tubing and connect to the outlet.  Sit upright in a chair or bed. Hold the incentive spirometer at eye level.   Put the mouthpiece in your mouth and close your lips tightly around it. Slowly breathe out (exhale) completely.  Breathe in (inhale) slowly through your mouth as deeply as you can. As you take a breath, you will see the piston rise inside the large column. While the piston rises, the indicator should move upwards. It should stay in between the 2 arrows (see Figure).  Try to get the piston as high as you can, while keeping the indicator between the arrows.   If the indicator doesn't stay between the arrows, you're breathing either too fast or too slow.  When you get it as high as you can, hold your breath for 10 seconds, or as long as possible. While you're holding your breath, the piston will slowly fall to the base of the spirometer.  Once the piston reaches the bottom of the spirometer, breathe out slowly through your mouth. Rest for a few seconds.  Repeat 10 times. Try to get the piston to the same level with each breath.  Repeat every hour while awake  You can carefully clean the outside of the mouthpiece with an alcohol wipe or soap and water.       Patient and Family Education             Ways You Can Help Prevent Blood Clots                    This handout explains some simple things you can do to help prevent blood clots.      Blood clots are blockages that can form in the body's veins. When a blood clot forms in your deep veins, it may be called a deep vein thrombosis, or DVT for short. Blood clots can happen in any part of the body where blood flows, but they are most common in the arms and legs. If a piece of a blood clot breaks free and travels to the lungs, it is called a pulmonary embolus (PE). A PE can be a very serious problem.         Being in the hospital  or having surgery can raise your chances of getting a blood clot because you may not be well enough to move around as much as you normally do.         Ways you can help prevent blood clots in the hospital           Wearing SCDs. SCDs stands for Sequential Compression Devices.   SCDs are special sleeves that wrap around your legs  They attach to a pump that fills them with air to gently squeeze your legs every few minutes.   This helps return the blood in your legs to your heart.   SCDs should only be taken off when walking or bathing.   SCDs may not be comfortable, but they can help save your life.                                            Wearing compression stockings - if your doctor orders them. These special snug fitting stockings gently squeeze your legs to help blood flow.       Walking. Walking helps move the blood in your legs.   If your doctor says it is ok, try walking the halls at least   5 times a day. Ask us to help you get up, so you don't fall.      Taking any blood thinning medicines your doctor orders.        Page 1 of 2            [facility]; 3/23   Ways you can help prevent blood clots at home         Wearing compression stockings - if your doctor orders them. ? Walking - to help move the blood in your legs.       Taking any blood thinning medicines your doctor orders.      Signs of a blood clot or PE        Tell your doctor or nurse know right away if you have of the problems listed below.    If you are at home, seek medical care right away. Call 911 for chest pain or problems breathing.          Signs of a blood clot (DVT) - such as pain,  swelling, redness or warmth in your arm or leg      Signs of a pulmonary embolism (PE) - such as chest pain or feeling short of breath

## 2024-10-10 NOTE — H&P (VIEW-ONLY)
CPM/PAT Evaluation       Name: Raphael Crain (Raphael Crain)  /Age: 1955/69 y.o.     In-Person       Chief Complaint: right knee pain    Patient is an alert and oriented 69 year old male scheduled for a right knee arthroscopy on 10/15/24  with Dr. Anderson. PMHX includes Type 2 DM, dyslipidemia, GERD, HTN, hypothyroidism and mild leukopenia/thrombocytopenia/anemia . Presents to [facility] today for perioperative risk stratification and optimization.     Medication Documentation Review Audit       Reviewed by Yuni Foss RN (Registered Nurse) on 10/10/24 at 1527      Medication Order Taking? Sig Documenting Provider Last Dose Status   amLODIPine (Norvasc) 5 mg tablet [id number] Yes Take 1 tablet (5 mg) by mouth once daily. Keely Bradley MD 10/9/2024 Active   aspirin (Vazalore) 81 mg capsule [id number] Yes Take 81 mg by mouth once daily. Historical Provider, MD 10/10/2024 Active   cyanocobalamin (Vitamin B-12) 1,000 mcg tablet [id number] Yes Take 1 tablet (1,000 mcg) by mouth once daily. Historical Provider, MD 10/10/2024 Active   gabapentin (Neurontin) 300 mg capsule [id number] Yes Take 1 capsule (300 mg) by mouth 3 times a day.   Patient taking differently: Take 1 capsule (300 mg) by mouth 3 times a day. Takes only 2 x daily    Boris Michel MD 10/10/2024 Active   levothyroxine (Synthroid, Levoxyl) 150 mcg tablet [id number] Yes Take 1 tablet (150 mcg) by mouth once daily. Keely Bradley MD 10/10/2024 Active   losartan (Cozaar) 100 mg tablet [id number] Yes Take 1 tablet (100 mg) by mouth once daily. Keely Bradley MD 10/9/2024 Active   metFORMIN (Glucophage) 500 mg tablet [id number] Yes Take 1 tablet (500 mg) by mouth 2 times daily (morning and late afternoon). Keely Bradley MD 10/10/2024 Active   metoprolol succinate XL (Toprol-XL) 50 mg 24 hr tablet [id number] Yes Take 1 tablet (50 mg) by mouth once daily. Keely Bradley MD 10/10/2024 Active   omeprazole (PriLOSEC) 40 mg DR capsule [id number] Yes Take 1  capsule (40 mg) by mouth once daily. Keely Bradley MD 10/10/2024 Active   pravastatin (Pravachol) 40 mg tablet [id number] Yes Take 1 tablet (40 mg) by mouth once daily. Keely Bradley MD 10/9/2024 Active                      Review of Systems   Constitutional: Negative for chills, decreased appetite, diaphoresis, fever and malaise/fatigue.   Eyes:  Negative for blurred vision and double vision.   Cardiovascular:  Negative for chest pain, claudication, cyanosis, dyspnea on exertion, irregular heartbeat, leg swelling, near-syncope and palpitations.   Respiratory:  Negative for cough, hemoptysis, shortness of breath and wheezing.    Endocrine: Negative for cold intolerance, heat intolerance, polydipsia, polyphagia and polyuria.   Gastrointestinal:  Negative for abdominal pain, constipation, diarrhea, dysphagia, nausea and vomiting.   Genitourinary:  Negative for bladder incontinence, dysuria, hematuria, incomplete emptying, nocturia, frequency, pelvic pain and urgency.   Neurological:  Negative for headaches, light-headedness, paresthesias, sensory change and weakness.   Psychiatric/Behavioral:  Negative for altered mental status.    Musculoskeletal: Negative for myalgias, arthralgias     Vitals and nursing note reviewed.     Physical exam  Constitutional:       Appearance: Normal appearance. He is Overweight.   HENT:      Head: Normocephalic and atraumatic.      Mouth/Throat:      Mouth: Mucous membranes are moist.      Pharynx: Oropharynx is clear.   Eyes:      Extraocular Movements: Extraocular movements intact.      Conjunctiva/sclera: Conjunctivae normal.      Pupils: Pupils are equal, round, and reactive to light.   Cardiovascular:      PMI at left midclavicular line. Normal rate. Regular rhythm. Normal S1. Normal S2.       Murmurs: There is no murmur.      No gallop.  No click. No rub.       No audible carotid bruit     No lower extremity edema on exam  Pulmonary:      Effort: Pulmonary effort is normal.       Breath sounds: Normal breath sounds.   Abdominal:      General: Abdomen is flat. Bowel sounds are normal.      Palpations: Abdomen is soft and non-tender  Musculoskeletal:      Cervical back: Normal range of motion and neck supple.   Skin:     General: Skin is warm and dry.      Capillary Refill: Capillary refill takes less than 2 seconds.   Neurological:      General: No focal deficit present.      Mental Status: He is alert and oriented to person, place, and time. Mental status is at baseline.   Psychiatric:         Mood and Affect: Mood normal.         Behavior: Behavior normal.         Thought Content: Thought content normal.         Judgment: Judgment normal.     Vitals and nursing note reviewed. Physical exam within normal limits.     Assessment & Plan:    Neuro:  No diagnosis or significant findings on chart review or clinical presentation and evaluation.     HEENT/Airway:  No diagnosis or significant findings on chart review or clinical presentation and evaluation.   STOP-BANG Score- 4 points moderaterisk for RULA    Mallampati::  II    TM distance::  >3 FB    Neck ROM::  Full  Dentures-denies  Crowns-denies  Implants-denies    Cardiovascular:  History of HTN and dyslipidemia. Per Dr. Keely Bradley progress note on 8/20/24 pt has history of IRBBB. However all EKG in epic show NSR and no right bbb. I will be faxing over EKG to Dr. Kendal Bradley office.   METS: 8  RCRI: 0 points, 3.9%  risk for postoperative MACE   TEJA: 0.2% risk for postoperative MACE  EKG -normal sinus rhythm, RBBB Rate- 66   Pulmonary:  No diagnosis or significant findings on chart review or clinical presentation and evaluation.   ARISCAT: <26 points, 1.6% risk of in-hospital postoperative pulmonary complication  PRODIGY: High risk for opioid induced respiratory depression  Smoking History-He has never smoked.  Discussed smoking cessation and deep breathing handout given    Renal/Urinary:  No diagnosis or significant findings on chart review or  clinical presentation and evaluation, however, the patient is at increased risk of perioperative renal complications secondary to age>/= 56, male sex, HTN, and diabetes. Preventative measures include BP monitoring, medication compliance, and hydration management.   CMP-Pending    Endocrine:  No diagnosis or significant findings on chart review or clinical presentation and evaluation. History of Type 2 DM and hypothyroidism.  [id number]  TSH - pending    Hematologic/Immunology:  No diagnosis or significant findings on chart review or clinical presentation and evaluation.  The patient is not a Jehovah’s witness and will accept blood and blood products if medically indicated.   History of previous blood transfusions No  CBC-Pending  Caprini Score 5, patient at High for postoperative DVT. Pt supplied education/VTE handout  Anticoagulation use: No     Gastrointestinal:   No diagnosis or significant findings on chart review or clinical presentation and evaluation. History of GERD.  Recreational drug use: Drug use No  Alcohol use glass of wine with dinner    Infectious disease:   No diagnosis or significant findings on chart review or clinical presentation and evaluation.     Musculoskeletal:   No diagnosis or significant findings on chart review or clinical presentation and evaluation.   JHFRAT score- 7 points. moderate risk for falls    Anesthesia:  ASA 2 - Patient with mild systemic disease with no functional limitations  Anticipated anesthesia-General  History of General anesthesia- yes  Complications- No anesthesia complications  No family history of anesthesia complications    Abnormalities noted on PAT evaluation: No    Labs & Imaging ordered:  CBC, CMP, HBA1C, TSH, EKG  EKG and medical clearance form faxed to Dr. Keely Bradley office  Nickel/metal allergy-negative  Shellfish allergy-negative    Overall, patient Moderate Risk for the scheduled Moderate Risk surgery. Discussed with patient medication instructions,  NPO guidelines, and any questions or concerns.     Face to face time- 20 minutes

## 2024-10-11 LAB
ATRIAL RATE: 66 BPM
P AXIS: 58 DEGREES
P OFFSET: 198 MS
P ONSET: 144 MS
PR INTERVAL: 150 MS
Q ONSET: 219 MS
QRS COUNT: 10 BEATS
QRS DURATION: 136 MS
QT INTERVAL: 428 MS
QTC CALCULATION(BAZETT): 448 MS
QTC FREDERICIA: 442 MS
R AXIS: 43 DEGREES
T AXIS: 11 DEGREES
T OFFSET: 433 MS
VENTRICULAR RATE: 66 BPM

## 2024-10-14 ENCOUNTER — DOCUMENTATION (OUTPATIENT)
Dept: PREADMISSION TESTING | Facility: HOSPITAL | Age: 69
End: 2024-10-14
Payer: MEDICARE

## 2024-10-14 NOTE — PROGRESS NOTES
Reviewed new EKG showing RBBB with Dr Posadas from anesthesia. Patient ok to proceed with surgery at this time without further testing. Hx of RBBB noted by PCP without obtainable tracing. Patient asymptomatic with good functional status    Brayden Fernandes, JANIYA-CNP

## 2024-10-15 ENCOUNTER — ANESTHESIA (OUTPATIENT)
Dept: OPERATING ROOM | Facility: HOSPITAL | Age: 69
End: 2024-10-15
Payer: MEDICARE

## 2024-10-15 ENCOUNTER — ANESTHESIA EVENT (OUTPATIENT)
Dept: OPERATING ROOM | Facility: HOSPITAL | Age: 69
End: 2024-10-15
Payer: MEDICARE

## 2024-10-17 ENCOUNTER — APPOINTMENT (OUTPATIENT)
Dept: PHYSICAL THERAPY | Facility: CLINIC | Age: 69
End: 2024-10-17
Payer: MEDICARE

## 2024-10-18 ENCOUNTER — HOSPITAL ENCOUNTER (OUTPATIENT)
Facility: HOSPITAL | Age: 69
Setting detail: OUTPATIENT SURGERY
Discharge: HOME | End: 2024-10-18
Attending: STUDENT IN AN ORGANIZED HEALTH CARE EDUCATION/TRAINING PROGRAM | Admitting: STUDENT IN AN ORGANIZED HEALTH CARE EDUCATION/TRAINING PROGRAM
Payer: MEDICARE

## 2024-10-18 VITALS
TEMPERATURE: 96.8 F | RESPIRATION RATE: 18 BRPM | BODY MASS INDEX: 28.41 KG/M2 | OXYGEN SATURATION: 98 % | SYSTOLIC BLOOD PRESSURE: 162 MMHG | HEIGHT: 70 IN | WEIGHT: 198.41 LBS | HEART RATE: 66 BPM | DIASTOLIC BLOOD PRESSURE: 92 MMHG

## 2024-10-18 DIAGNOSIS — M65.961 SYNOVITIS OF RIGHT KNEE: Primary | ICD-10-CM

## 2024-10-18 DIAGNOSIS — M22.41 CHONDROMALACIA OF RIGHT PATELLA: ICD-10-CM

## 2024-10-18 DIAGNOSIS — S83.241D ACUTE MEDIAL MENISCUS TEAR, RIGHT, SUBSEQUENT ENCOUNTER: ICD-10-CM

## 2024-10-18 LAB — GLUCOSE BLD MANUAL STRIP-MCNC: 153 MG/DL (ref 74–99)

## 2024-10-18 PROCEDURE — 3600000009 HC OR TIME - EACH INCREMENTAL 1 MINUTE - PROCEDURE LEVEL FOUR: Performed by: STUDENT IN AN ORGANIZED HEALTH CARE EDUCATION/TRAINING PROGRAM

## 2024-10-18 PROCEDURE — 7100000010 HC PHASE TWO TIME - EACH INCREMENTAL 1 MINUTE: Performed by: STUDENT IN AN ORGANIZED HEALTH CARE EDUCATION/TRAINING PROGRAM

## 2024-10-18 PROCEDURE — 7100000002 HC RECOVERY ROOM TIME - EACH INCREMENTAL 1 MINUTE: Performed by: STUDENT IN AN ORGANIZED HEALTH CARE EDUCATION/TRAINING PROGRAM

## 2024-10-18 PROCEDURE — 29876 ARTHRS KNEE SURG SYNVCT MAJ: CPT | Performed by: STUDENT IN AN ORGANIZED HEALTH CARE EDUCATION/TRAINING PROGRAM

## 2024-10-18 PROCEDURE — 2500000001 HC RX 250 WO HCPCS SELF ADMINISTERED DRUGS (ALT 637 FOR MEDICARE OP): Performed by: ANESTHESIOLOGY

## 2024-10-18 PROCEDURE — 7100000001 HC RECOVERY ROOM TIME - INITIAL BASE CHARGE: Performed by: STUDENT IN AN ORGANIZED HEALTH CARE EDUCATION/TRAINING PROGRAM

## 2024-10-18 PROCEDURE — 29881 ARTHRS KNE SRG MNISECTMY M/L: CPT

## 2024-10-18 PROCEDURE — 3600000004 HC OR TIME - INITIAL BASE CHARGE - PROCEDURE LEVEL FOUR: Performed by: STUDENT IN AN ORGANIZED HEALTH CARE EDUCATION/TRAINING PROGRAM

## 2024-10-18 PROCEDURE — 3700000002 HC GENERAL ANESTHESIA TIME - EACH INCREMENTAL 1 MINUTE: Performed by: STUDENT IN AN ORGANIZED HEALTH CARE EDUCATION/TRAINING PROGRAM

## 2024-10-18 PROCEDURE — 7100000009 HC PHASE TWO TIME - INITIAL BASE CHARGE: Performed by: STUDENT IN AN ORGANIZED HEALTH CARE EDUCATION/TRAINING PROGRAM

## 2024-10-18 PROCEDURE — 2500000004 HC RX 250 GENERAL PHARMACY W/ HCPCS (ALT 636 FOR OP/ED): Performed by: NURSE ANESTHETIST, CERTIFIED REGISTERED

## 2024-10-18 PROCEDURE — 2500000004 HC RX 250 GENERAL PHARMACY W/ HCPCS (ALT 636 FOR OP/ED): Mod: JZ

## 2024-10-18 PROCEDURE — A4550 SURGICAL TRAYS: HCPCS | Performed by: STUDENT IN AN ORGANIZED HEALTH CARE EDUCATION/TRAINING PROGRAM

## 2024-10-18 PROCEDURE — 29876 ARTHRS KNEE SURG SYNVCT MAJ: CPT

## 2024-10-18 PROCEDURE — 29881 ARTHRS KNE SRG MNISECTMY M/L: CPT | Performed by: STUDENT IN AN ORGANIZED HEALTH CARE EDUCATION/TRAINING PROGRAM

## 2024-10-18 PROCEDURE — 2500000001 HC RX 250 WO HCPCS SELF ADMINISTERED DRUGS (ALT 637 FOR MEDICARE OP)

## 2024-10-18 PROCEDURE — 3700000001 HC GENERAL ANESTHESIA TIME - INITIAL BASE CHARGE: Performed by: STUDENT IN AN ORGANIZED HEALTH CARE EDUCATION/TRAINING PROGRAM

## 2024-10-18 PROCEDURE — 82947 ASSAY GLUCOSE BLOOD QUANT: CPT

## 2024-10-18 PROCEDURE — 2500000004 HC RX 250 GENERAL PHARMACY W/ HCPCS (ALT 636 FOR OP/ED): Performed by: STUDENT IN AN ORGANIZED HEALTH CARE EDUCATION/TRAINING PROGRAM

## 2024-10-18 PROCEDURE — 2720000007 HC OR 272 NO HCPCS: Performed by: STUDENT IN AN ORGANIZED HEALTH CARE EDUCATION/TRAINING PROGRAM

## 2024-10-18 RX ORDER — FENTANYL CITRATE 50 UG/ML
25 INJECTION, SOLUTION INTRAMUSCULAR; INTRAVENOUS EVERY 5 MIN PRN
Status: DISCONTINUED | OUTPATIENT
Start: 2024-10-18 | End: 2024-10-18 | Stop reason: HOSPADM

## 2024-10-18 RX ORDER — MEPERIDINE HYDROCHLORIDE 25 MG/ML
12.5 INJECTION INTRAMUSCULAR; INTRAVENOUS; SUBCUTANEOUS EVERY 10 MIN PRN
Status: DISCONTINUED | OUTPATIENT
Start: 2024-10-18 | End: 2024-10-18 | Stop reason: HOSPADM

## 2024-10-18 RX ORDER — ONDANSETRON 4 MG/1
4 TABLET, FILM COATED ORAL EVERY 8 HOURS PRN
Qty: 20 TABLET | Refills: 0 | Status: SHIPPED | OUTPATIENT
Start: 2024-10-18 | End: 2024-10-25

## 2024-10-18 RX ORDER — ALBUTEROL SULFATE 0.83 MG/ML
2.5 SOLUTION RESPIRATORY (INHALATION) ONCE AS NEEDED
Status: DISCONTINUED | OUTPATIENT
Start: 2024-10-18 | End: 2024-10-18 | Stop reason: HOSPADM

## 2024-10-18 RX ORDER — ACETAMINOPHEN 325 MG/1
975 TABLET ORAL ONCE
Status: COMPLETED | OUTPATIENT
Start: 2024-10-18 | End: 2024-10-18

## 2024-10-18 RX ORDER — DIPHENHYDRAMINE HYDROCHLORIDE 50 MG/ML
12.5 INJECTION INTRAMUSCULAR; INTRAVENOUS ONCE AS NEEDED
Status: DISCONTINUED | OUTPATIENT
Start: 2024-10-18 | End: 2024-10-18 | Stop reason: HOSPADM

## 2024-10-18 RX ORDER — ONDANSETRON HYDROCHLORIDE 2 MG/ML
INJECTION, SOLUTION INTRAVENOUS AS NEEDED
Status: DISCONTINUED | OUTPATIENT
Start: 2024-10-18 | End: 2024-10-18

## 2024-10-18 RX ORDER — ONDANSETRON HYDROCHLORIDE 2 MG/ML
4 INJECTION, SOLUTION INTRAVENOUS ONCE AS NEEDED
Status: DISCONTINUED | OUTPATIENT
Start: 2024-10-18 | End: 2024-10-18 | Stop reason: HOSPADM

## 2024-10-18 RX ORDER — MIDAZOLAM HYDROCHLORIDE 1 MG/ML
INJECTION, SOLUTION INTRAMUSCULAR; INTRAVENOUS AS NEEDED
Status: DISCONTINUED | OUTPATIENT
Start: 2024-10-18 | End: 2024-10-18

## 2024-10-18 RX ORDER — IPRATROPIUM BROMIDE 0.5 MG/2.5ML
500 SOLUTION RESPIRATORY (INHALATION) ONCE
Status: DISCONTINUED | OUTPATIENT
Start: 2024-10-18 | End: 2024-10-18 | Stop reason: HOSPADM

## 2024-10-18 RX ORDER — BUPIVACAINE HYDROCHLORIDE 5 MG/ML
INJECTION, SOLUTION PERINEURAL AS NEEDED
Status: DISCONTINUED | OUTPATIENT
Start: 2024-10-18 | End: 2024-10-18 | Stop reason: HOSPADM

## 2024-10-18 RX ORDER — GABAPENTIN 300 MG/1
300 CAPSULE ORAL ONCE
Status: DISCONTINUED | OUTPATIENT
Start: 2024-10-18 | End: 2024-10-18 | Stop reason: HOSPADM

## 2024-10-18 RX ORDER — KETOROLAC TROMETHAMINE 30 MG/ML
INJECTION, SOLUTION INTRAMUSCULAR; INTRAVENOUS AS NEEDED
Status: DISCONTINUED | OUTPATIENT
Start: 2024-10-18 | End: 2024-10-18

## 2024-10-18 RX ORDER — OXYCODONE HYDROCHLORIDE 5 MG/1
5 TABLET ORAL EVERY 4 HOURS PRN
Qty: 15 TABLET | Refills: 0 | Status: SHIPPED | OUTPATIENT
Start: 2024-10-18 | End: 2024-10-21

## 2024-10-18 RX ORDER — CEFAZOLIN SODIUM 2 G/100ML
2 INJECTION, SOLUTION INTRAVENOUS ONCE
Status: COMPLETED | OUTPATIENT
Start: 2024-10-18 | End: 2024-10-18

## 2024-10-18 RX ORDER — CELECOXIB 200 MG/1
200 CAPSULE ORAL ONCE
Status: COMPLETED | OUTPATIENT
Start: 2024-10-18 | End: 2024-10-18

## 2024-10-18 RX ORDER — PROPOFOL 10 MG/ML
INJECTION, EMULSION INTRAVENOUS AS NEEDED
Status: DISCONTINUED | OUTPATIENT
Start: 2024-10-18 | End: 2024-10-18

## 2024-10-18 RX ORDER — OXYCODONE HYDROCHLORIDE 5 MG/1
5 TABLET ORAL EVERY 4 HOURS PRN
Status: DISCONTINUED | OUTPATIENT
Start: 2024-10-18 | End: 2024-10-18 | Stop reason: HOSPADM

## 2024-10-18 RX ORDER — FENTANYL CITRATE 50 UG/ML
INJECTION, SOLUTION INTRAMUSCULAR; INTRAVENOUS AS NEEDED
Status: DISCONTINUED | OUTPATIENT
Start: 2024-10-18 | End: 2024-10-18

## 2024-10-18 RX ORDER — HYDRALAZINE HYDROCHLORIDE 20 MG/ML
5 INJECTION INTRAMUSCULAR; INTRAVENOUS EVERY 30 MIN PRN
Status: DISCONTINUED | OUTPATIENT
Start: 2024-10-18 | End: 2024-10-18 | Stop reason: HOSPADM

## 2024-10-18 RX ORDER — LIDOCAINE HYDROCHLORIDE 10 MG/ML
INJECTION, SOLUTION EPIDURAL; INFILTRATION; INTRACAUDAL; PERINEURAL AS NEEDED
Status: DISCONTINUED | OUTPATIENT
Start: 2024-10-18 | End: 2024-10-18

## 2024-10-18 RX ORDER — ACETAMINOPHEN 500 MG
1000 TABLET ORAL EVERY 8 HOURS PRN
Qty: 60 TABLET | Refills: 1 | Status: SHIPPED | OUTPATIENT
Start: 2024-10-18 | End: 2024-11-07

## 2024-10-18 RX ORDER — SODIUM CHLORIDE, SODIUM LACTATE, POTASSIUM CHLORIDE, CALCIUM CHLORIDE 600; 310; 30; 20 MG/100ML; MG/100ML; MG/100ML; MG/100ML
INJECTION, SOLUTION INTRAVENOUS CONTINUOUS PRN
Status: DISCONTINUED | OUTPATIENT
Start: 2024-10-18 | End: 2024-10-18

## 2024-10-18 RX ORDER — HYDROMORPHONE HYDROCHLORIDE 0.2 MG/ML
0.2 INJECTION INTRAMUSCULAR; INTRAVENOUS; SUBCUTANEOUS EVERY 5 MIN PRN
Status: DISCONTINUED | OUTPATIENT
Start: 2024-10-18 | End: 2024-10-18 | Stop reason: HOSPADM

## 2024-10-18 SDOH — HEALTH STABILITY: MENTAL HEALTH: CURRENT SMOKER: 0

## 2024-10-18 ASSESSMENT — PAIN - FUNCTIONAL ASSESSMENT
PAIN_FUNCTIONAL_ASSESSMENT: 0-10

## 2024-10-18 ASSESSMENT — COLUMBIA-SUICIDE SEVERITY RATING SCALE - C-SSRS
1. IN THE PAST MONTH, HAVE YOU WISHED YOU WERE DEAD OR WISHED YOU COULD GO TO SLEEP AND NOT WAKE UP?: NO
6. HAVE YOU EVER DONE ANYTHING, STARTED TO DO ANYTHING, OR PREPARED TO DO ANYTHING TO END YOUR LIFE?: NO
2. HAVE YOU ACTUALLY HAD ANY THOUGHTS OF KILLING YOURSELF?: NO

## 2024-10-18 ASSESSMENT — PAIN SCALES - GENERAL
PAINLEVEL_OUTOF10: 3
PAIN_LEVEL: 0
PAINLEVEL_OUTOF10: 0 - NO PAIN
PAINLEVEL_OUTOF10: 5 - MODERATE PAIN
PAINLEVEL_OUTOF10: 1
PAINLEVEL_OUTOF10: 3
PAINLEVEL_OUTOF10: 3
PAINLEVEL_OUTOF10: 0 - NO PAIN

## 2024-10-18 ASSESSMENT — PAIN DESCRIPTION - DESCRIPTORS: DESCRIPTORS: SHARP

## 2024-10-18 NOTE — ANESTHESIA PROCEDURE NOTES
Airway  Date/Time: 10/18/2024 9:48 AM  Urgency: elective    Airway not difficult    Staffing  Performed: CRNA   Authorized by: Abdullahi Posadas MD    Performed by: JANIYA Miller-CRNA  Patient location during procedure: OR    Indications and Patient Condition  Indications for airway management: anesthesia  Spontaneous ventilation: present  Sedation level: deep  Preoxygenated: yes  Patient position: sniffing  Mask difficulty assessment: 0 - not attempted    Final Airway Details  Final airway type: supraglottic airway      Successful airway: classic  Size 4     Number of attempts at approach: 1

## 2024-10-18 NOTE — OP NOTE
Right knee arthroscopy, partial medial meniscectomy, intra-articular debridement and synovectomy, chondroplasty (R) Operative Note     Date: 10/18/2024  OR Location: [facility]    Name: Raphael Crain, : 1955, Age: 69 y.o., MRN: [id number], Sex: male    Diagnosis  Pre-op Diagnosis      * Chondromalacia of right patella [M22.41]     * Acute medial meniscus tear, right, subsequent encounter [S83.241D]     * Synovitis of right knee [M65.961] Post-op Diagnosis     * Chondromalacia of right patella [M22.41]     * Acute medial meniscus tear, right, subsequent encounter [S83.241D]     * Synovitis of right knee [M65.961]     Procedures  1.  Right knee arthroscopy, partial medial meniscectomy  2.  Right knee arthroscopic chondroplasty patellofemoral joint  3.  Right knee arthroscopic extensive intra-articular debridement and synovectomy    Surgeons      * Suhail Anderson - Primary    Resident/Fellow/Other Assistant:  Surgeons and Role:     * Gabrielle Lopresti, PA-C - [employer]    Procedure Summary  Anesthesia: General  ASA: III  Anesthesia Staff: Anesthesiologist: Abdullahi Posadas MD  CRNA: JANIYA Miller-CRNA  Estimated Blood Loss: 5mL  Intra-op Medications:   Administrations occurring from 0945 to 1125 on 10/18/24:   Medication Name Total Dose   BUPivacaine HCl (Marcaine) 0.5 % (5 mg/mL) injection 10 mL   oxyCODONE (Roxicodone) immediate release tablet 5 mg 5 mg   dexAMETHasone (Decadron) injection 4 mg/mL 8 mg   fentaNYL (Sublimaze) injection 50 mcg/mL 100 mcg   ketorolac (Toradol) injection 30 mg 30 mg   LR infusion Cannot be calculated   lidocaine PF (Xylocaine-MPF) 1 % 5 mL   ondansetron (Zofran) 2 mg/mL injection 4 mg   propofol (Diprivan) injection 10 mg/mL 200 mg          Anesthesia Record               Intraprocedure I/O Totals          Intake    LR infusion 250.00 mL    Total Intake 250 mL          Specimen: No specimens collected     Staff:   Circulator: Kalee Santos Person: Christine Santos Person:  Jaleesa     Drains and/or Catheters: * None in log *    Tourniquet Times:     Total Tourniquet Time Documented:  Thigh (Right) - 29 minutes  Total: Thigh (Right) - 29 minutes      Implants: None    Findings: Complex tear medial meniscus, chondromalacia patellofemoral joint, tricompartmental synovitis    Indications: Raphael Crain is an 69 y.o. male who is having surgery for right knee complex tear medial meniscus, chondromalacia patellofemoral joint and tricompartmental synovitis that failed extensive nonoperative management.  MRI confirmed the above.  After long discussion with the patient, he elected to proceed with surgical invention form of a right knee arthroscopy, partial meniscectomy, chondroplasty, intra-articular [place] and synovectomy.  I thoroughly explained the risks and benefits as well as the expected postoperative timeline for the proposed procedure versus nonoperative management. Risks of this procedure include but are not limited to bleeding, infection, nerve injury, DVT and failure of repair or implant.  The patient expressed understanding and wished to proceed with surgical intervention.  All questions were answered. They were consented to the above procedure at bedside.    The patient was seen in the preoperative area. The risks, benefits, complications, treatment options, non-operative alternatives, expected recovery and outcomes were discussed with the patient. The possibilities of reaction to medication, pulmonary aspiration, injury to surrounding structures, bleeding, recurrent infection, the need for additional procedures, failure to diagnose a condition, and creating a complication requiring transfusion or operation were discussed with the patient. The patient concurred with the proposed plan, giving informed consent.  The site of surgery was properly noted/marked if necessary per policy. The patient has been actively warmed in preoperative area. Preoperative antibiotics have been ordered  and given within 1 hours of incision. Venous thrombosis prophylaxis have been ordered including unilateral sequential compression device    Procedure Details:   EXAMINATION UNDER ANESTHESIA: Range of motion 0-140; Stable to varus/valgus/anterior/posterior; Negative Lachman; Negative Anterior and Posterior Drawer    ARTHROSCOPIC FINDINGS:   The patellofemoral joint demonstrated chondromalacia of the central patella and trochlea with loose chondral flaps.  These were gently debrided in the form of a chondroplasty with an arthroscopic shaver back to a stable rim of healthy appearing chondral tissue.  This yielded approximately grade 3 changes of the central trochlea and grade 2-3 changes of the patella without evidence of full-thickness chondral loss. The gutters were clear. The medial compartment demonstrated grade 1-2 changes with otherwise intact chondral surfaces and the medial meniscus was torn at the body extending into the posterior horn with a large loose meniscal fragment flipped between the tibia and the MCL.  This was reduced into the joint and a partial medial meniscectomy was performed with a combination of arthroscopic biters and rossi back to a stable rim of healthy appearing meniscus tissue.  Approximately 40% of the meniscus was removed and the remaining 60% of the meniscus was intact and stable to probing. The notch showed an intact PCL and ACL. The lateral compartment demonstrated largely intact chondral surfaces and the lateral meniscus was intact and stable to probing. There was an extensive amount of scar tissue in the infrapatellar fat pad that was thoroughly debrided. An extensive intra-articular debridement and synovectomy was performed in the suprapatellar pouch, infrapatellar fat pad, medial and lateral gutters, femoral notch, patellofemoral compartment and medial and lateral compartments with a combination of arthroscopic shaver and ablator.    PROCEDURE IN DETAIL:   The patient was  identified in the preoperative area. The right knee was marked as the visible operative field. They were then brought to the operating room and placed supine on the operating room table. SCDs were placed for DVT prophylaxis and antibiotics were given. After smooth induction of general anesthesia, the contralateral lower extremity was placed in a well-padded lithotomy taylor and the operative lower extremity was placed with tourniquet and a leg taylor. The right lower extremity was prepped and draped in usual sterile fashion. A timeout was taken to ensure the correct patient, correct site, correct procedure, as well as that preoperative antibiotics had been given and DVT prophylaxis was in place.      We began by making a standard inferolateral portal. Diagnostic arthroscopy of the patellofemoral joint and gutters was performed as described above.  A gentle chondroplasty was performed on the patella.  The knee was brought into flexion. The rest of the diagnostic arthroscopy was completed as described above.    The patellofemoral joint demonstrated chondromalacia of the central patella and trochlea with loose chondral flaps.  These were gently debrided in the form of a chondroplasty with an arthroscopic shaver back to a stable rim of healthy appearing chondral tissue.  This yielded approximately grade 3 changes of the central trochlea and grade 2-3 changes of the patella without evidence of full-thickness chondral loss. The gutters were clear. The medial compartment demonstrated grade 1-2 changes with otherwise intact chondral surfaces and the medial meniscus was torn at the body extending into the posterior horn with a large loose meniscal fragment flipped between the tibia and the MCL.  This was reduced into the joint and a partial medial meniscectomy was performed with a combination of arthroscopic biters and rossi back to a stable rim of healthy appearing meniscus tissue.  Approximately 40% of the meniscus was  removed and the remaining 60% of the meniscus was intact and stable to probing. The notch showed an intact PCL and ACL. The lateral compartment demonstrated largely intact chondral surfaces and the lateral meniscus was intact and stable to probing. There was an extensive amount of scar tissue in the infrapatellar fat pad that was thoroughly debrided. An extensive intra-articular debridement and synovectomy was performed in the suprapatellar pouch, infrapatellar fat pad, medial and lateral gutters, femoral notch, patellofemoral compartment and medial and lateral compartments with a combination of arthroscopic shaver and ablator.    Arthroscopic fluid was drained from the knee as the arthroscope was removed.    The wounds were irrigated. The portals were closed with buried interrupted 3-0 monocryl sutures. Steri-strips, a sterile dressing, and Ace wrap were placed. The needle, sponge, and instrument counts were correct at the end of the case. The patient was awoken, taken to PACU in stable condition.     POSTOPERATIVE PLAN: The patient will begin the arthroscopic meniscectomy postoperative protocol. The patient will start by progressively increasing weightbearing as the postoperative protocol dictates with crutches.  The patient will follow up with me at their routine 2-week postoperative visit. They will begin PT in the next 1-2 weeks. The patient will be started on aspirin 81 mg twice daily for 2 weeks for DVT prophylaxis.     Gabrielle LoPresti, PA-C was present for the entire case.  Her assistance was necessary and critical to the successful completion of the procedure.  She provided skilled assistance with arthroscope manipulation, knee positioning, wound closure.  A qualified assistant was not available to perform her portion of the case.    Complications:  None; patient tolerated the procedure well.    Disposition: PACU - hemodynamically stable.  Condition: stable     Attending Attestation: I was present and  scrubbed for the entire procedure.    Suhail Anderson  Phone Number: [phone]

## 2024-10-18 NOTE — BRIEF OP NOTE
Date: 10/18/2024  OR Location: [facility]    Name: Raphael Crain, : 1955, Age: 69 y.o., MRN: [id number], Sex: male    Diagnosis  Pre-op Diagnosis      * Chondromalacia of right patella [M22.41]     * Acute medial meniscus tear, right, subsequent encounter [S83.241D]     * Synovitis of right knee [M65.961] Post-op Diagnosis     * Chondromalacia of right patella [M22.41]     * Acute medial meniscus tear, right, subsequent encounter [S83.241D]     * Synovitis of right knee [M65.961]     Procedures  1.  Right knee arthroscopy, partial medial meniscectomy  2.  Right knee arthroscopic chondroplasty patellofemoral joint  3.  Right knee arthroscopic extensive intra-articular debridement and synovectomy    Surgeons      * Suhail Anderson - Primary    Resident/Fellow/Other Assistant:  Surgeons and Role:     * Gabrielle Lopresti, PA-C - [employer]    Procedure Summary  Anesthesia: General  ASA: III  Anesthesia Staff: Anesthesiologist: Abdullahi Posadas MD  CRNA: JANIYA Miller-CRNA  Estimated Blood Loss: 5 mL  Intra-op Medications:   Administrations occurring from 0945 to 1125 on 10/18/24:   Medication Name Total Dose   BUPivacaine HCl (Marcaine) 0.5 % (5 mg/mL) injection 10 mL   oxyCODONE (Roxicodone) immediate release tablet 5 mg 5 mg   dexAMETHasone (Decadron) injection 4 mg/mL 8 mg   fentaNYL (Sublimaze) injection 50 mcg/mL 100 mcg   ketorolac (Toradol) injection 30 mg 30 mg   LR infusion Cannot be calculated   lidocaine PF (Xylocaine-MPF) 1 % 5 mL   ondansetron (Zofran) 2 mg/mL injection 4 mg   propofol (Diprivan) injection 10 mg/mL 200 mg          Anesthesia Record               Intraprocedure I/O Totals          Intake    LR infusion 250.00 mL    Total Intake 250 mL          Specimen: No specimens collected     Staff:   Circulator: Kalee Santos Person: Christine Santos Person: Jaleesa    Findings: Complex tear medial meniscus, chondromalacia patellofemoral joint, tricompartmental synovitis    Complications:   None; patient tolerated the procedure well.     Disposition: PACU - hemodynamically stable.  Condition: stable  Specimens Collected: No specimens collected  Attending Attestation: I was present and scrubbed for the entire procedure.    Suhail Anderson  Phone Number: [phone]

## 2024-10-18 NOTE — ANESTHESIA POSTPROCEDURE EVALUATION
Patient: Raphael Crain    Procedure Summary       Date: 10/18/24 Room / Location: [facility] / [facility]    Anesthesia Start: 0942 Anesthesia Stop: 1040    Procedure: Right knee arthroscopy, partial medial meniscectomy, intra-articular debridement and synovectomy, chondroplasty (Right: Knee) Diagnosis:       Chondromalacia of right patella      Acute medial meniscus tear, right, subsequent encounter      Synovitis of right knee      (Chondromalacia of right patella [M22.41])      (Acute medial meniscus tear, right, subsequent encounter [S83.241D])      (Synovitis of right knee [M65.961])    Surgeons: Suhail Anderson MD Responsible Provider: Abdullahi Posadas MD    Anesthesia Type: general ASA Status: 3            Anesthesia Type: general    Vitals Value Taken Time   /87 10/18/24 1110   Temp 36.4 °C (97.5 °F) 10/18/24 1110   Pulse 68 10/18/24 1110   Resp 14 10/18/24 1110   SpO2 97 % 10/18/24 1110       Anesthesia Post Evaluation    Patient location during evaluation: PACU  Patient participation: complete - patient participated  Level of consciousness: awake  Pain score: 0  Pain management: adequate  Multimodal analgesia pain management approach  Airway patency: patent  Two or more strategies used to mitigate risk of obstructive sleep apnea  Cardiovascular status: acceptable  Respiratory status: acceptable  Hydration status: acceptable  Postoperative Nausea and Vomiting: none        No notable events documented.

## 2024-10-18 NOTE — ANESTHESIA PREPROCEDURE EVALUATION
Patient: Raphael Crain    Procedure Information       Date/Time: 10/18/24 0945    Procedure: Right knee arthroscopy, partial medial meniscectomy, intra-articular debridement and synovectomy, chondroplasty (Right: Knee) - 45min    Location: [facility] / [facility]    Surgeons: Suhail Anderson MD            Relevant Problems   Anesthesia (within normal limits)      Cardiac   (+) Benign essential hypertension   (+) Hyperlipidemia      Pulmonary (within normal limits)      Neuro (within normal limits)      GI   (+) Gastroesophageal reflux disease without esophagitis   (+) Irritable bowel      /Renal (within normal limits)      Liver (within normal limits)      Endocrine   (+) Hypothyroidism   (+) Type 2 diabetes mellitus      Hematology   (+) Thrombocytopenia (CMS-HCC)      Musculoskeletal   (+) Chondromalacia of right patella   (+) Primary osteoarthritis of both knees      HEENT (within normal limits)      ID (within normal limits)      Skin (within normal limits)      GYN (within normal limits)       Clinical information reviewed:   Tobacco  Allergies  Meds   Med Hx  Surg Hx   Fam Hx  Soc Hx        NPO Detail:  NPO/Void Status  Date of Last Liquid: 10/17/24  Time of Last Liquid: 2100  Date of Last Solid: 10/17/24  Time of Last Solid: 2100  Time of Last Void: 0700         Physical Exam    Airway  Mallampati: II  Neck ROM: full     Cardiovascular    Dental - normal exam     Pulmonary    Abdominal            Anesthesia Plan    History of general anesthesia?: yes  History of complications of general anesthesia?: no    ASA 3     general     The patient is not a current smoker.  Patient was not previously instructed to abstain from smoking on day of procedure.  Patient did not smoke on day of procedure.  Education provided regarding risk of obstructive sleep apnea.  intravenous induction   Anesthetic plan and risks discussed with patient.    Plan discussed with CRNA and CAA.

## 2024-10-21 ASSESSMENT — PAIN SCALES - GENERAL: PAINLEVEL_OUTOF10: 2

## 2024-10-22 ENCOUNTER — APPOINTMENT (OUTPATIENT)
Dept: PHYSICAL THERAPY | Facility: CLINIC | Age: 69
End: 2024-10-22
Payer: MEDICARE

## 2024-10-22 DIAGNOSIS — R26.2 DIFFICULTY WALKING: Primary | ICD-10-CM

## 2024-10-22 DIAGNOSIS — M25.661 KNEE STIFF, RIGHT: ICD-10-CM

## 2024-10-22 DIAGNOSIS — S83.241D ACUTE MEDIAL MENISCUS TEAR OF RIGHT KNEE, SUBSEQUENT ENCOUNTER: ICD-10-CM

## 2024-10-22 PROCEDURE — 97161 PT EVAL LOW COMPLEX 20 MIN: CPT | Mod: GP | Performed by: PHYSICAL THERAPIST

## 2024-10-22 PROCEDURE — 97110 THERAPEUTIC EXERCISES: CPT | Mod: GP | Performed by: PHYSICAL THERAPIST

## 2024-10-22 SDOH — ECONOMIC STABILITY: FOOD INSECURITY: WITHIN THE PAST 12 MONTHS, YOU WORRIED THAT YOUR FOOD WOULD RUN OUT BEFORE YOU GOT MONEY TO BUY MORE.: NEVER TRUE

## 2024-10-22 SDOH — ECONOMIC STABILITY: FOOD INSECURITY: WITHIN THE PAST 12 MONTHS, THE FOOD YOU BOUGHT JUST DIDN'T LAST AND YOU DIDN'T HAVE MONEY TO GET MORE.: NEVER TRUE

## 2024-10-22 ASSESSMENT — LIFESTYLE VARIABLES
HOW OFTEN DO YOU HAVE A DRINK CONTAINING ALCOHOL: NEVER
AUDIT-C TOTAL SCORE: 0
SKIP TO QUESTIONS 9-10: 1
HOW MANY STANDARD DRINKS CONTAINING ALCOHOL DO YOU HAVE ON A TYPICAL DAY: PATIENT DOES NOT DRINK
HOW OFTEN DO YOU HAVE SIX OR MORE DRINKS ON ONE OCCASION: NEVER

## 2024-10-22 ASSESSMENT — ENCOUNTER SYMPTOMS
OCCASIONAL FEELINGS OF UNSTEADINESS: 0
DEPRESSION: 0
LOSS OF SENSATION IN FEET: 0

## 2024-10-22 ASSESSMENT — PAIN SCALES - GENERAL: PAINLEVEL_OUTOF10: 2

## 2024-10-22 ASSESSMENT — PAIN - FUNCTIONAL ASSESSMENT: PAIN_FUNCTIONAL_ASSESSMENT: 0-10

## 2024-10-22 NOTE — PROGRESS NOTES
Physical Therapy    Physical Therapy Lower Extremity Evaluation    Patient Name: Raphael Crain  MRN: [id number]  Today's Date: 10/22/2024  Time Calculation  Start Time: 1445  Stop Time: 1530  Time Calculation (min): 45 min  PT Evaluation Time Entry  PT Evaluation (Low) Time Entry: 35  PT Therapeutic Procedures Time Entry  Therapeutic Exercise Time Entry: 10  Payor: [employer] MEDICARE / Plan: [employer] GOLD CHOICE / Product Type: *No Product type* /     Reason for Referral: S/P menisectomy  Referred By: Justin  General Comment: Visit 1 of    Current Problem  Problem List Items Addressed This Visit    None  Visit Diagnoses         Codes    Difficulty walking    -  Primary R26.2    Relevant Orders    Follow Up In Physical Therapy    Acute medial meniscus tear of right knee, subsequent encounter     S83.241D    Relevant Orders    Follow Up In Physical Therapy    Knee stiff, right     M25.661    Relevant Orders    Follow Up In Physical Therapy           Precautions  Precautions  STEADI Fall Risk Score (The score of 4 or more indicates an increased risk of falling): 0  Precautions Comment: post op protocol     Pain  Pain Assessment: 0-10  0-10 (Numeric) Pain Score: 2 (4/10)  Pain Location: Knee  Pain Orientation: Right    SUBJECTIVE:   Chief complaint:  Pt reports he underwent knee scope on 10/18/24 for medial menisectomy and debridement of the knee. Notes using quad cane for gait at this time. Notes swelling and has been icing at home. Notes soreness generally around the knee. Notes tightness and soreness with bending the knee. Denies any significant weakness.     Pain Better: ice, OTC meds     Pain Worse: laying     Imaging: no recent imaging since surgery     Prior level of function: previously independent with gait, stairs and ADL's.   Current limitations: gait, stairs    Home setup:reviewed and no concern     Work:      Patients goal: get back to independent exercise    Prior tx: no prior PT tx this past  calendar yr.     Medical hx has been reviewed with the patient.     OBJECTIVE:    Lower Extremity Strength:  MMT 5/5 max  RIGHT LEFT   Hip Flexion 4 5   Hip Extension 4 5   Hip Abduction 4+ 5   Hip Adduction 4+ 5   Knee Extension 4+ 5   Knee Flexion 4+ 5   Ankle DF 5 5   Ankle PF 5 5   Ankle INV 5 5   Ankle EV 5 5     Lower Extremity ROM: WNL unless documented below:  ROM in Degrees  RIGHT LEFT   Knee Extension -3 0   Knee Flexion 108 130     Joint mobility Patella all ranges hypomobile and mild pain with superior glide.   Girth: 42.5 mid jt R knee, 39 cm L knee mid jt  Gait mechanics: Ambulating Quad cane R UE. Stiff knee gait.   Palpation: Mild tenderness medial and lateral jt line.     Other Measures  Lower Extremity Funtional Score (LEFS): 31/80     TREATMENT:  Eval  2. Instruction in HEP:  Access Code: [email or web address]  URL: [email or web address]  Date: 10/22/2024  Prepared by:  Sid    Exercises  - Supine Quad Set (Mirrored)  - 1 x daily - 7 x weekly - 2 sets - 10 reps - 5 sec hold  - Supine Active Straight Leg Raise  - 1 x daily - 7 x weekly - 2 sets - 10 reps  - Supine Hip Adduction Isometric with Ball  - 1 x daily - 7 x weekly - 2 sets - 10 reps  - Supine Heel Slide with Strap (Mirrored)  - 1 x daily - 7 x weekly - 2 sets - 10 reps  - Seated Hamstring Stretch  - 1 x daily - 7 x weekly - 1 sets - 3 reps - 20 sec hold  - Seated Calf Stretch with Strap  - 1 x daily - 7 x weekly - 1 sets - 3 reps - 20 sec hold    ASSESSEMENT  Pt is a 69 y.o. referred to physical therapy for S/P R knee arthroscopic menisectomy on 10/18/24 by Suhail Anderson MD. Pt presents with signs and symptoms of pain, weakness, reduced gait/balance and overall reduced function that is associated with his surgery. This pt would benefit from a therapy program to restore prior level of function, reduce pain, increase AROM, increase strength, and improve gait and balance.     Complexity: Low  Clinical presentation: Stable  and/or uncomplicated characteristics  The physical therapy prognosis is good for the patient to achieve their goals.   The pt tolerated therapy treatment today well with no adverse effects.  Personal Factors/Barriers to therapy include:  none     PLAN  The pt will be seen 2 days a week for 6 weeks.      The pt has been educated about the risks and benefits of physical therapy including manual therapy treatments. Pt agrees with POC and gives consent for treatment.     The patient will benefit from physical therapy treatment to include: therapeutic exercises, therapeutic activities, neurological re-education, manual therapy, modalities, and a home exercise program.     Goals:  Active       PT Problem       Reduce pain at worst to 2/10 with all prior activity.        Start:  10/22/24    Expected End:  11/06/24            Pt to ambulate with correct gait pattern R LE FWB with no A.D for community distances.        Start:  10/22/24    Expected End:  11/06/24            Reduce pain at worst to 0/10 with all prior activity.        Start:  10/22/24    Expected End:  11/21/24            Increase knee flexion to 130 degrees and ext to 0 degrees for gait, stairs, and ADL's.        Start:  10/22/24    Expected End:  12/03/24            Pt to increase LE strength to grossly 5/5 for improved gait, stairs, lifting and ADL's.        Start:  10/22/24    Expected End:  12/03/24            Pt to score an increase of 10 points or > on LEFS to display overall increased function.         Start:  10/22/24    Expected End:  12/03/24            Pt to be independent with a HEP for self management.        Start:  10/22/24    Expected End:  12/03/24

## 2024-10-24 ENCOUNTER — TREATMENT (OUTPATIENT)
Dept: PHYSICAL THERAPY | Facility: CLINIC | Age: 69
End: 2024-10-24
Payer: MEDICARE

## 2024-10-24 DIAGNOSIS — M25.661 KNEE STIFF, RIGHT: ICD-10-CM

## 2024-10-24 DIAGNOSIS — R26.2 DIFFICULTY WALKING: Primary | ICD-10-CM

## 2024-10-24 DIAGNOSIS — S83.241D ACUTE MEDIAL MENISCUS TEAR OF RIGHT KNEE, SUBSEQUENT ENCOUNTER: ICD-10-CM

## 2024-10-24 PROCEDURE — 97140 MANUAL THERAPY 1/> REGIONS: CPT | Mod: GP | Performed by: PHYSICAL THERAPIST

## 2024-10-24 PROCEDURE — 97016 VASOPNEUMATIC DEVICE THERAPY: CPT | Mod: GP | Performed by: PHYSICAL THERAPIST

## 2024-10-24 PROCEDURE — 97110 THERAPEUTIC EXERCISES: CPT | Mod: GP | Performed by: PHYSICAL THERAPIST

## 2024-10-24 ASSESSMENT — PAIN - FUNCTIONAL ASSESSMENT: PAIN_FUNCTIONAL_ASSESSMENT: 0-10

## 2024-10-24 ASSESSMENT — PAIN SCALES - GENERAL: PAINLEVEL_OUTOF10: 3

## 2024-10-24 NOTE — PROGRESS NOTES
"Physical Therapy    Physical Therapy Treatment    Patient Name: Raphael Crain  MRN: [id number]  Today's Date: 10/24/2024  Time Calculation  Start Time: 1445  Stop Time: 1540  Time Calculation (min): 55 min     PT Therapeutic Procedures Time Entry  Manual Therapy Time Entry: 10  Therapeutic Exercise Time Entry: 35  PT Modalities Time Entry  Vasopneumatic Devices Time Entry: 10              Payor: HU[employer]NA MEDICARE / Plan: HUMANA GOLD CHOICE / Product Type: *No Product type* /     Reason for Referral: S/P menisectomy  Referred By: Justin  General Comment: Visit 2 of 10 ( auth exp 12-25-24)    Current Problem    Problem List Items Addressed This Visit    None  Visit Diagnoses         Codes    Difficulty walking    -  Primary R26.2    Acute medial meniscus tear of right knee, subsequent encounter     S83.241D    Knee stiff, right     M25.661             Subjective   Pt note she is doing ok with the HEP. Notes some soreness in the knee with exercises but nothing too bad.   Compliance with HEP-yes    Precautions  Precautions  STEADI Fall Risk Score (The score of 4 or more indicates an increased risk of falling): no change  Precautions Comment: post op protocol    Pain  Pain Assessment: 0-10  0-10 (Numeric) Pain Score: 3  Pain Location: Knee  Pain Orientation: Right    Objective   115* flexion R knee    Treatments:  There-ex: x 35 min   Nu-step x 5 min  HS stretch x 1 min   Calf stretch x 1 min   1/2 foam calf raises 2 x 10   1/2 foam toe raises 2 x 10  3 way hip t-band 10 x ea B LE  4\" step up forward 2 x 10 R LE  4\" step up lateral 2 x 0 R LE  Quad sets 2 x 10   SLR 2 x 10 R LE   Hip add lisa 2 x 10     Manual Tx: x 10 min   Patella mobs all ranges grade 3   Knee flexion/ext mobs grade 3-4    Informed consent given on manual treatment. Pt given opportunity to cease treatment at any time. Educated pt on expected soreness, possible ecchymosis. Pt voiced understanding  No adverse effects were noted post tx.      Modalities: " x 10 min   Gameready x 10 min post tx R lknee  Girth R knee mid jt 42.5 cm pretc, 42 cm post.    Skin intact post tx with no adverse affects.     Current HEP:  2. Instruction in HEP:  Access Code: [email or web address]  URL: [email or web address]  Date: 10/22/2024  Prepared by:  Fanwood    Exercises  - Supine Quad Set (Mirrored)  - 1 x daily - 7 x weekly - 2 sets - 10 reps - 5 sec hold  - Supine Active Straight Leg Raise  - 1 x daily - 7 x weekly - 2 sets - 10 reps  - Supine Hip Adduction Isometric with Ball  - 1 x daily - 7 x weekly - 2 sets - 10 reps  - Supine Heel Slide with Strap (Mirrored)  - 1 x daily - 7 x weekly - 2 sets - 10 reps  - Seated Hamstring Stretch  - 1 x daily - 7 x weekly - 1 sets - 3 reps - 20 sec hold  - Seated Calf Stretch with Strap  - 1 x daily - 7 x weekly - 1 sets - 3 reps - 20 sec hold  Assessment:   Pt overall progressed with his program today in the clinic. Tolerated all activity with no significant issues noted. Recommended to continue with current HEP.     Plan:   Pt to continue to progress with ROM and Strength as tolerated.     Goals:  Active       PT Problem       Reduce pain at worst to 2/10 with all prior activity.        Start:  10/22/24    Expected End:  11/06/24            Pt to ambulate with correct gait pattern R LE FWB with no A.D for community distances.        Start:  10/22/24    Expected End:  11/06/24            Reduce pain at worst to 0/10 with all prior activity.        Start:  10/22/24    Expected End:  11/21/24            Increase knee flexion to 130 degrees and ext to 0 degrees for gait, stairs, and ADL's.        Start:  10/22/24    Expected End:  12/03/24            Pt to increase LE strength to grossly 5/5 for improved gait, stairs, lifting and ADL's.        Start:  10/22/24    Expected End:  12/03/24            Pt to score an increase of 10 points or > on LEFS to display overall increased function.         Start:  10/22/24    Expected End:  12/03/24             Pt to be independent with a HEP for self management.        Start:  10/22/24    Expected End:  12/03/24

## 2024-10-28 ENCOUNTER — APPOINTMENT (OUTPATIENT)
Dept: ORTHOPEDIC SURGERY | Facility: CLINIC | Age: 69
End: 2024-10-28
Payer: MEDICARE

## 2024-10-28 VITALS — BODY MASS INDEX: 28.41 KG/M2 | HEIGHT: 70 IN | WEIGHT: 198.41 LBS

## 2024-10-28 DIAGNOSIS — S83.241D ACUTE MEDIAL MENISCUS TEAR OF RIGHT KNEE, SUBSEQUENT ENCOUNTER: Primary | ICD-10-CM

## 2024-10-28 PROCEDURE — 4010F ACE/ARB THERAPY RXD/TAKEN: CPT

## 2024-10-28 PROCEDURE — 3051F HG A1C>EQUAL 7.0%<8.0%: CPT

## 2024-10-28 PROCEDURE — 1160F RVW MEDS BY RX/DR IN RCRD: CPT

## 2024-10-28 PROCEDURE — 1125F AMNT PAIN NOTED PAIN PRSNT: CPT

## 2024-10-28 PROCEDURE — 1159F MED LIST DOCD IN RCRD: CPT

## 2024-10-28 PROCEDURE — 1123F ACP DISCUSS/DSCN MKR DOCD: CPT

## 2024-10-28 PROCEDURE — 3008F BODY MASS INDEX DOCD: CPT

## 2024-10-28 PROCEDURE — 99024 POSTOP FOLLOW-UP VISIT: CPT

## 2024-10-28 PROCEDURE — 1036F TOBACCO NON-USER: CPT

## 2024-10-28 ASSESSMENT — PAIN SCALES - GENERAL: PAINLEVEL_OUTOF10: 4

## 2024-10-28 ASSESSMENT — PAIN - FUNCTIONAL ASSESSMENT: PAIN_FUNCTIONAL_ASSESSMENT: 0-10

## 2024-10-29 ENCOUNTER — TREATMENT (OUTPATIENT)
Dept: PHYSICAL THERAPY | Facility: CLINIC | Age: 69
End: 2024-10-29
Payer: MEDICARE

## 2024-10-29 DIAGNOSIS — S83.241D ACUTE MEDIAL MENISCUS TEAR OF RIGHT KNEE, SUBSEQUENT ENCOUNTER: ICD-10-CM

## 2024-10-29 DIAGNOSIS — M25.661 KNEE STIFF, RIGHT: ICD-10-CM

## 2024-10-29 DIAGNOSIS — R26.2 DIFFICULTY WALKING: Primary | ICD-10-CM

## 2024-10-29 PROCEDURE — 97110 THERAPEUTIC EXERCISES: CPT | Mod: GP | Performed by: PHYSICAL THERAPIST

## 2024-10-29 PROCEDURE — 97016 VASOPNEUMATIC DEVICE THERAPY: CPT | Mod: GP | Performed by: PHYSICAL THERAPIST

## 2024-10-29 PROCEDURE — 97140 MANUAL THERAPY 1/> REGIONS: CPT | Mod: GP | Performed by: PHYSICAL THERAPIST

## 2024-10-29 ASSESSMENT — PAIN - FUNCTIONAL ASSESSMENT: PAIN_FUNCTIONAL_ASSESSMENT: 0-10

## 2024-10-29 ASSESSMENT — PAIN SCALES - GENERAL: PAINLEVEL_OUTOF10: 3

## 2024-10-31 ENCOUNTER — TREATMENT (OUTPATIENT)
Dept: PHYSICAL THERAPY | Facility: CLINIC | Age: 69
End: 2024-10-31
Payer: MEDICARE

## 2024-10-31 DIAGNOSIS — S83.241D ACUTE MEDIAL MENISCUS TEAR OF RIGHT KNEE, SUBSEQUENT ENCOUNTER: ICD-10-CM

## 2024-10-31 DIAGNOSIS — R26.2 DIFFICULTY WALKING: Primary | ICD-10-CM

## 2024-10-31 DIAGNOSIS — M25.661 KNEE STIFF, RIGHT: ICD-10-CM

## 2024-10-31 PROCEDURE — 97016 VASOPNEUMATIC DEVICE THERAPY: CPT | Mod: GP | Performed by: PHYSICAL THERAPIST

## 2024-10-31 PROCEDURE — 97110 THERAPEUTIC EXERCISES: CPT | Mod: GP | Performed by: PHYSICAL THERAPIST

## 2024-10-31 PROCEDURE — 97140 MANUAL THERAPY 1/> REGIONS: CPT | Mod: GP | Performed by: PHYSICAL THERAPIST

## 2024-10-31 ASSESSMENT — PAIN SCALES - GENERAL: PAINLEVEL_OUTOF10: 4

## 2024-10-31 ASSESSMENT — PAIN - FUNCTIONAL ASSESSMENT: PAIN_FUNCTIONAL_ASSESSMENT: 0-10

## 2024-11-05 ENCOUNTER — APPOINTMENT (OUTPATIENT)
Dept: PHYSICAL THERAPY | Facility: CLINIC | Age: 69
End: 2024-11-05
Payer: MEDICARE

## 2024-11-07 ENCOUNTER — TREATMENT (OUTPATIENT)
Dept: PHYSICAL THERAPY | Facility: CLINIC | Age: 69
End: 2024-11-07
Payer: MEDICARE

## 2024-11-07 DIAGNOSIS — M25.661 KNEE STIFF, RIGHT: ICD-10-CM

## 2024-11-07 DIAGNOSIS — S83.241D ACUTE MEDIAL MENISCUS TEAR OF RIGHT KNEE, SUBSEQUENT ENCOUNTER: ICD-10-CM

## 2024-11-07 DIAGNOSIS — R26.2 DIFFICULTY WALKING: Primary | ICD-10-CM

## 2024-11-07 PROCEDURE — 97016 VASOPNEUMATIC DEVICE THERAPY: CPT | Mod: GP | Performed by: PHYSICAL THERAPIST

## 2024-11-07 PROCEDURE — 97110 THERAPEUTIC EXERCISES: CPT | Mod: GP | Performed by: PHYSICAL THERAPIST

## 2024-11-07 PROCEDURE — 97140 MANUAL THERAPY 1/> REGIONS: CPT | Mod: GP | Performed by: PHYSICAL THERAPIST

## 2024-11-07 ASSESSMENT — PAIN SCALES - GENERAL: PAINLEVEL_OUTOF10: 5 - MODERATE PAIN

## 2024-11-07 ASSESSMENT — PAIN - FUNCTIONAL ASSESSMENT: PAIN_FUNCTIONAL_ASSESSMENT: 0-10

## 2024-11-07 ASSESSMENT — PAIN DESCRIPTION - DESCRIPTORS: DESCRIPTORS: SORE

## 2024-11-07 NOTE — PROGRESS NOTES
"Physical Therapy    Physical Therapy Treatment    Patient Name: Raphael Crain  MRN: [id number]  Today's Date: 11/7/2024  Time Calculation  Start Time: 1445  Stop Time: 1540  Time Calculation (min): 55 min     PT Therapeutic Procedures Time Entry  Manual Therapy Time Entry: 10  Therapeutic Exercise Time Entry: 35  PT Modalities Time Entry  Vasopneumatic Devices Time Entry: 10              Payor: HUMANA MEDICARE / Plan: HUMANA GOLD CHOICE / Product Type: *No Product type* /     Reason for Referral: S/P menisectomy  Referred By: Justin  General Comment: Visit 5 of 10 ( auth exp 12-25-24)    Current Problem    Problem List Items Addressed This Visit    None  Visit Diagnoses         Codes    Difficulty walking    -  Primary R26.2    Acute medial meniscus tear of right knee, subsequent encounter     S83.241D    Knee stiff, right     M25.661             Subjective   Pt notes more soreness in the R knee today and felt over the past few days. Notes feeling some increased swelling as well.    Compliance with HEP-yes    Precautions  Precautions  Precautions Comment: post op protocol    Pain  Pain Assessment: 0-10  0-10 (Numeric) Pain Score: 5 - Moderate pain  Pain Location: Knee  Pain Orientation: Right  Pain Descriptors: Sore      Objective   No measures     Treatments:  There-ex: x 35 min   Nu-step x 5 min  HS stretch x 1 min   Calf stretch x 1 min   1/2 foam calf raises 2 x 10   1/2 foam toe raises 2 x 10  3 way hip RTB t-band 2 x 10 ea B LE  6\" step up forward 2 x 10 R LE  4\" step up lateral 2 x 10 R LE  Reverse step up 2\" R LE on step 2 x 10   TKE GTB 2 x 10 R LE   Airex March x 1 min   Airex tandem stand x 1 min ea  Quad sets 2 x 10 - added to HEP   SLR 2 x 10 R LE - added to HEP   Hip add lisa 2 x 10 - HEP after today     Manual Tx: x 10 min   STM to R medial knee   Patella mobs all ranges grade 3   Knee flexion/ext mobs grade 3-4    Informed consent given on manual treatment. Pt given opportunity to cease treatment at " any time. Educated pt on expected soreness, possible ecchymosis. Pt voiced understanding  No adverse effects were noted post tx.      Modalities: x 10 min   Gameready x 10 min post tx R lknee  Girth R knee mid jt 41.5 cm pre tx, 40 cm post.    Skin intact post tx with no adverse affects.     Current HEP:  2. Instruction in HEP:  Access Code: [email or web address]  URL: [email or web address]  Date: 10/22/2024  Prepared by:  Sid    Exercises  - Supine Quad Set (Mirrored)  - 1 x daily - 7 x weekly - 2 sets - 10 reps - 5 sec hold  - Supine Active Straight Leg Raise  - 1 x daily - 7 x weekly - 2 sets - 10 reps  - Supine Hip Adduction Isometric with Ball  - 1 x daily - 7 x weekly - 2 sets - 10 reps  - Supine Heel Slide with Strap (Mirrored)  - 1 x daily - 7 x weekly - 2 sets - 10 reps  - Seated Hamstring Stretch  - 1 x daily - 7 x weekly - 1 sets - 3 reps - 20 sec hold  - Seated Calf Stretch with Strap  - 1 x daily - 7 x weekly - 1 sets - 3 reps - 20 sec hold    Assessment:   Pt had signficant tendenrness over the MCL right knee today and had some increased swelling medial knee. Overall pt doing well with strength and ROM in the R knee at this time. Recommended to ice at home and take it easy for a few days to allow inflammation to reduce.     Plan:   Continue to progress WB activity as tolerated.     Goals:  Active       PT Problem       Reduce pain at worst to 2/10 with all prior activity.        Start:  10/22/24    Expected End:  11/06/24            Pt to ambulate with correct gait pattern R LE FWB with no A.D for community distances.        Start:  10/22/24    Expected End:  11/06/24            Reduce pain at worst to 0/10 with all prior activity.        Start:  10/22/24    Expected End:  11/21/24            Increase knee flexion to 130 degrees and ext to 0 degrees for gait, stairs, and ADL's.        Start:  10/22/24    Expected End:  12/03/24            Pt to increase LE strength to grossly 5/5 for  improved gait, stairs, lifting and ADL's.        Start:  10/22/24    Expected End:  12/03/24            Pt to score an increase of 10 points or > on LEFS to display overall increased function.         Start:  10/22/24    Expected End:  12/03/24            Pt to be independent with a HEP for self management.        Start:  10/22/24    Expected End:  12/03/24

## 2024-11-12 ENCOUNTER — TREATMENT (OUTPATIENT)
Dept: PHYSICAL THERAPY | Facility: CLINIC | Age: 69
End: 2024-11-12
Payer: MEDICARE

## 2024-11-12 DIAGNOSIS — M25.661 KNEE STIFF, RIGHT: ICD-10-CM

## 2024-11-12 DIAGNOSIS — S83.241D ACUTE MEDIAL MENISCUS TEAR OF RIGHT KNEE, SUBSEQUENT ENCOUNTER: ICD-10-CM

## 2024-11-12 DIAGNOSIS — R26.2 DIFFICULTY WALKING: Primary | ICD-10-CM

## 2024-11-12 PROCEDURE — 97140 MANUAL THERAPY 1/> REGIONS: CPT | Mod: GP | Performed by: PHYSICAL THERAPIST

## 2024-11-12 PROCEDURE — 97110 THERAPEUTIC EXERCISES: CPT | Mod: GP | Performed by: PHYSICAL THERAPIST

## 2024-11-12 ASSESSMENT — PAIN SCALES - GENERAL: PAINLEVEL_OUTOF10: 3

## 2024-11-12 ASSESSMENT — PAIN - FUNCTIONAL ASSESSMENT: PAIN_FUNCTIONAL_ASSESSMENT: 0-10

## 2024-11-12 NOTE — PROGRESS NOTES
"Physical Therapy    Physical Therapy Treatment    Patient Name: Raphael Crain  MRN: [id number]  Today's Date: 11/12/2024  Time Calculation  Start Time: 1445  Stop Time: 1530  Time Calculation (min): 45 min     PT Therapeutic Procedures Time Entry  Manual Therapy Time Entry: 10  Therapeutic Exercise Time Entry: 35                 Payor: HUMANA MEDICARE / Plan: HUMANA GOLD CHOICE / Product Type: *No Product type* /     Reason for Referral: S/P menisectomy  Referred By: Justin  General Comment: Visit 6 of 10 ( auth exp 12-25-24)    Current Problem    Problem List Items Addressed This Visit    None  Visit Diagnoses         Codes    Difficulty walking    -  Primary R26.2    Acute medial meniscus tear of right knee, subsequent encounter     S83.241D    Knee stiff, right     M25.661           Subjective   Pt notes his knee feels better than last week. Still some soreness but was able to do exercises at home with no difficulty.    Compliance with HEP-yes    Precautions  Precautions  STEADI Fall Risk Score (The score of 4 or more indicates an increased risk of falling): no change  Precautions Comment: post op protocol    Pain  Pain Assessment: 0-10  0-10 (Numeric) Pain Score: 3  Pain Location: Knee  Pain Orientation: Right    Objective   120* flexion R knee AROM     Treatments:  There-ex: x 35 min   Nu-step x 5 min  HS stretch x 1 min   Calf stretch x 1 min   1/2 foam calf raises 2 x 10   1/2 foam toe raises 2 x 10  3 way hip RTB t-band 2 x 10 ea B LE  6\" step up forward 2 x 10 R LE  6\" step up lateral 2 x 10 R LE  Reverse step up 4\" R LE on step 2 x 10   TKE GTB 2 x 10 R LE   Airex March x 1 min   Airex tandem stand x 1 min ea  Hurdles lateral 6\" 10 ft R/L x 2   Hurdles forward 6\" 2 x 10     Manual Tx: x 10 min   STM to R medial knee   Patella mobs all ranges grade 3   Knee flexion/ext mobs grade 3-4    Informed consent given on manual treatment. Pt given opportunity to cease treatment at any time. Educated pt on expected " soreness, possible ecchymosis. Pt voiced understanding  No adverse effects were noted post tx.      Current HEP:  Access Code: [email or web address]  URL: [email or web address]  Date: 11/12/2024  Prepared by:  Sid    Exercises  - Supine Quad Set (Mirrored)  - 1 x daily - 7 x weekly - 2 sets - 10 reps - 5 sec hold  - Supine Active Straight Leg Raise  - 1 x daily - 7 x weekly - 2 sets - 10 reps  - Supine Hip Adduction Isometric with Ball  - 1 x daily - 7 x weekly - 2 sets - 10 reps  - Supine Heel Slide with Strap (Mirrored)  - 1 x daily - 7 x weekly - 2 sets - 10 reps  - Seated Hamstring Stretch  - 1 x daily - 7 x weekly - 1 sets - 3 reps - 20 sec hold  - Seated Calf Stretch with Strap  - 1 x daily - 7 x weekly - 1 sets - 3 reps - 20 sec hold  - Standing Terminal Knee Extension with Resistance  - 1 x daily - 7 x weekly - 2 sets - 10 reps  - Hip Abduction with Resistance Loop  - 1 x daily - 7 x weekly - 2 sets - 10 reps  - Hip Extension with Resistance Loop  - 1 x daily - 7 x weekly - 2 sets - 10 reps  - Standing Hip Flexion with Resistance Loop  - 1 x daily - 7 x weekly - 2 sets - 10 reps    Assessment:   Pt overal tolerated treatment well today and progressed with hurdles for gait. Overall doing well with AROM in the knee but still some mild tightness remains. Progressing well towards goals. Provided updated HEP today.     Plan:   Pt to continue per protocol R knee.     Goals:  Active       PT Problem       Reduce pain at worst to 2/10 with all prior activity.  (Progressing)       Start:  10/22/24    Expected End:  11/06/24            Pt to ambulate with correct gait pattern R LE FWB with no A.D for community distances.  (Met)       Start:  10/22/24    Expected End:  11/06/24    Resolved:  11/12/24         Reduce pain at worst to 0/10 with all prior activity.  (Progressing)       Start:  10/22/24    Expected End:  11/21/24            Increase knee flexion to 130 degrees and ext to 0 degrees for gait,  stairs, and ADL's.  (Progressing)       Start:  10/22/24    Expected End:  12/03/24            Pt to increase LE strength to grossly 5/5 for improved gait, stairs, lifting and ADL's.        Start:  10/22/24    Expected End:  12/03/24            Pt to score an increase of 10 points or > on LEFS to display overall increased function.         Start:  10/22/24    Expected End:  12/03/24            Pt to be independent with a HEP for self management.        Start:  10/22/24    Expected End:  12/03/24

## 2024-11-14 ENCOUNTER — TREATMENT (OUTPATIENT)
Dept: PHYSICAL THERAPY | Facility: CLINIC | Age: 69
End: 2024-11-14
Payer: MEDICARE

## 2024-11-14 DIAGNOSIS — S83.241D ACUTE MEDIAL MENISCUS TEAR OF RIGHT KNEE, SUBSEQUENT ENCOUNTER: ICD-10-CM

## 2024-11-14 DIAGNOSIS — R26.2 DIFFICULTY WALKING: ICD-10-CM

## 2024-11-14 DIAGNOSIS — M25.661 KNEE STIFF, RIGHT: ICD-10-CM

## 2024-11-14 PROCEDURE — 97140 MANUAL THERAPY 1/> REGIONS: CPT | Mod: GP | Performed by: PHYSICAL THERAPIST

## 2024-11-14 PROCEDURE — 97110 THERAPEUTIC EXERCISES: CPT | Mod: GP | Performed by: PHYSICAL THERAPIST

## 2024-11-14 ASSESSMENT — PAIN - FUNCTIONAL ASSESSMENT: PAIN_FUNCTIONAL_ASSESSMENT: 0-10

## 2024-11-14 ASSESSMENT — PAIN SCALES - GENERAL: PAINLEVEL_OUTOF10: 4

## 2024-11-14 NOTE — PROGRESS NOTES
"Physical Therapy    Physical Therapy Treatment    Patient Name: Raphael Crain  MRN: [id number]  Today's Date: 11/14/2024  Time Calculation  Start Time: 1445  Stop Time: 1530  Time Calculation (min): 45 min     PT Therapeutic Procedures Time Entry  Manual Therapy Time Entry: 10  Therapeutic Exercise Time Entry: 35                 Payor: HUMANA MEDICARE / Plan: HUMANA GOLD CHOICE / Product Type: *No Product type* /     Reason for Referral: S/P menisectomy  Referred By: Justin  General Comment: Visit 7 of 10 ( auth exp 12-25-24)    Current Problem    Problem List Items Addressed This Visit    None  Visit Diagnoses         Codes    Acute medial meniscus tear of right knee, subsequent encounter     S83.241D    Difficulty walking     R26.2    Knee stiff, right     M25.661           Subjective   Pt notes his knee is still painful again today. Notes gets worse at night when sleeping.   Compliance with HEP-yes    Precautions  Precautions  STEADI Fall Risk Score (The score of 4 or more indicates an increased risk of falling): no change  Precautions Comment: post op protocol    Pain  Pain Assessment: 0-10  0-10 (Numeric) Pain Score: 4  Pain Location: Knee  Pain Orientation: Right    Objective   117* flexion R knee AROM     Treatments:  There-ex: x 35 min   Nu-step x 5 min  HS stretch x 1 min   Calf stretch x 1 min   1/2 foam calf raises 2 x 10   1/2 foam toe raises 2 x 10  3 way hip RTB t-band 2 x 10 ea B LE  6\" step up forward 2 x 10 R LE  6\" step up lateral 2 x 10 R LE  Reverse step up 4\" R LE on step 2 x 10   TKE GTB 2 x 10 R LE   Airex March x 1 min   Airex tandem stand x 1 min ea  Hurdles lateral 6\" 10 ft R/L x 2   Hurdles forward 6\" 2 x 10     Manual Tx: x 10 min   STM to R medial knee   Patella mobs all ranges grade 3   Knee flexion/ext mobs grade 3-4    Informed consent given on manual treatment. Pt given opportunity to cease treatment at any time. Educated pt on expected soreness, possible ecchymosis. Pt voiced " understanding  No adverse effects were noted post tx.      Current HEP:  Access Code: [email or web address]  URL: [email or web address]  Date: 11/12/2024  Prepared by: LEN Lau    Exercises  - Supine Quad Set (Mirrored)  - 1 x daily - 7 x weekly - 2 sets - 10 reps - 5 sec hold  - Supine Active Straight Leg Raise  - 1 x daily - 7 x weekly - 2 sets - 10 reps  - Supine Hip Adduction Isometric with Ball  - 1 x daily - 7 x weekly - 2 sets - 10 reps  - Supine Heel Slide with Strap (Mirrored)  - 1 x daily - 7 x weekly - 2 sets - 10 reps  - Seated Hamstring Stretch  - 1 x daily - 7 x weekly - 1 sets - 3 reps - 20 sec hold  - Seated Calf Stretch with Strap  - 1 x daily - 7 x weekly - 1 sets - 3 reps - 20 sec hold  - Standing Terminal Knee Extension with Resistance  - 1 x daily - 7 x weekly - 2 sets - 10 reps  - Hip Abduction with Resistance Loop  - 1 x daily - 7 x weekly - 2 sets - 10 reps  - Hip Extension with Resistance Loop  - 1 x daily - 7 x weekly - 2 sets - 10 reps  - Standing Hip Flexion with Resistance Loop  - 1 x daily - 7 x weekly - 2 sets - 10 reps    Assessment:   Pt overall tolerated treatment well ad is doing well with his motion and strength but still experiencing some increased soreness/pain around the medial R knee. Recommended to hold on his HEP and take it easy over the upcomming weekend to allow knee to rest. Will resume exercises next visit.     Plan:   Continue to progress per protocol. Recheck in 3 visits     Goals:  Active       PT Problem       Reduce pain at worst to 2/10 with all prior activity.  (Progressing)       Start:  10/22/24    Expected End:  11/06/24            Pt to ambulate with correct gait pattern R LE FWB with no A.D for community distances.  (Met)       Start:  10/22/24    Expected End:  11/06/24    Resolved:  11/12/24         Reduce pain at worst to 0/10 with all prior activity.  (Progressing)       Start:  10/22/24    Expected End:  11/21/24            Increase knee  flexion to 130 degrees and ext to 0 degrees for gait, stairs, and ADL's.  (Progressing)       Start:  10/22/24    Expected End:  12/03/24            Pt to increase LE strength to grossly 5/5 for improved gait, stairs, lifting and ADL's.        Start:  10/22/24    Expected End:  12/03/24            Pt to score an increase of 10 points or > on LEFS to display overall increased function.         Start:  10/22/24    Expected End:  12/03/24            Pt to be independent with a HEP for self management.        Start:  10/22/24    Expected End:  12/03/24

## 2024-11-19 ENCOUNTER — APPOINTMENT (OUTPATIENT)
Dept: PHYSICAL THERAPY | Facility: CLINIC | Age: 69
End: 2024-11-19
Payer: MEDICARE

## 2024-11-21 ENCOUNTER — TREATMENT (OUTPATIENT)
Dept: PHYSICAL THERAPY | Facility: CLINIC | Age: 69
End: 2024-11-21
Payer: MEDICARE

## 2024-11-21 DIAGNOSIS — S83.241D ACUTE MEDIAL MENISCUS TEAR OF RIGHT KNEE, SUBSEQUENT ENCOUNTER: ICD-10-CM

## 2024-11-21 DIAGNOSIS — M25.661 KNEE STIFF, RIGHT: ICD-10-CM

## 2024-11-21 DIAGNOSIS — R26.2 DIFFICULTY WALKING: Primary | ICD-10-CM

## 2024-11-21 PROCEDURE — 97110 THERAPEUTIC EXERCISES: CPT | Mod: GP | Performed by: PHYSICAL THERAPIST

## 2024-11-21 PROCEDURE — 97140 MANUAL THERAPY 1/> REGIONS: CPT | Mod: GP | Performed by: PHYSICAL THERAPIST

## 2024-11-21 ASSESSMENT — PAIN - FUNCTIONAL ASSESSMENT: PAIN_FUNCTIONAL_ASSESSMENT: 0-10

## 2024-11-21 ASSESSMENT — PAIN SCALES - GENERAL: PAINLEVEL_OUTOF10: 2

## 2024-11-21 NOTE — PROGRESS NOTES
"Physical Therapy    Physical Therapy Treatment    Patient Name: Raphael Crain  MRN: [id number]  Today's Date: 11/21/2024  Time Calculation  Start Time: 1445  Stop Time: 1530  Time Calculation (min): 45 min     PT Therapeutic Procedures Time Entry  Manual Therapy Time Entry: 10  Therapeutic Exercise Time Entry: 35                 Payor: HUMANA MEDICARE / Plan: HUMANA GOLD CHOICE / Product Type: *No Product type* /     Reason for Referral: S/P menisectomy  Referred By: Justin  General Comment: Visit 8 of 10 ( auth exp 12-25-24)    Current Problem    Problem List Items Addressed This Visit    None  Visit Diagnoses         Codes    Difficulty walking    -  Primary R26.2    Acute medial meniscus tear of right knee, subsequent encounter     S83.241D    Knee stiff, right     M25.661           Subjective   Pt notes he felt better after this weekend with resting the knee.   Compliance with HEP-yes    Precautions  Precautions  STEADI Fall Risk Score (The score of 4 or more indicates an increased risk of falling): no change  Precautions Comment: post op protocol    Pain  Pain Assessment: 0-10  0-10 (Numeric) Pain Score: 2  Pain Location: Knee  Pain Orientation: Right    Objective   120 flexion R knee, 0 ext.    4+/5 strength R knee.     Treatments:  There-ex: x 35 min   Nu-step x 5 min  HS stretch x 1 min   Calf stretch x 1 min   1/2 foam calf raises 2 x 10   1/2 foam toe raises 2 x 10  3 way hip RTB t-band 2 x 10 ea B LE  6\" step up forward 2 x 10 R LE  6\" step up lateral 2 x 10 R LE  Reverse step up \" R LE on step 2 x 10   TKE GTB 2 x 10 R LE   Airex March x 1 min   Airex tandem stand x 1 min ea  Hurdles lateral 6\" 10 ft R/L x 2   Hurdles forward 6\" 2 x 10   Mini lunge 2 x 10 R LE    Manual Tx: x 10 min   STM to R medial knee   Patella mobs all ranges grade 3   Knee flexion/ext mobs grade 3-4    Informed consent given on manual treatment. Pt given opportunity to cease treatment at any time. Educated pt on expected soreness, " possible ecchymosis. Pt voiced understanding  No adverse effects were noted post tx.      Current HEP:  Access Code: [email or web address]  URL: [email or web address]  Date: 11/12/2024  Prepared by:  Sid    Exercises  - Supine Quad Set (Mirrored)  - 1 x daily - 7 x weekly - 2 sets - 10 reps - 5 sec hold  - Supine Active Straight Leg Raise  - 1 x daily - 7 x weekly - 2 sets - 10 reps  - Supine Hip Adduction Isometric with Ball  - 1 x daily - 7 x weekly - 2 sets - 10 reps  - Supine Heel Slide with Strap (Mirrored)  - 1 x daily - 7 x weekly - 2 sets - 10 reps  - Seated Hamstring Stretch  - 1 x daily - 7 x weekly - 1 sets - 3 reps - 20 sec hold  - Seated Calf Stretch with Strap  - 1 x daily - 7 x weekly - 1 sets - 3 reps - 20 sec hold  - Standing Terminal Knee Extension with Resistance  - 1 x daily - 7 x weekly - 2 sets - 10 reps  - Hip Abduction with Resistance Loop  - 1 x daily - 7 x weekly - 2 sets - 10 reps  - Hip Extension with Resistance Loop  - 1 x daily - 7 x weekly - 2 sets - 10 reps  - Standing Hip Flexion with Resistance Loop  - 1 x daily - 7 x weekly - 2 sets - 10 reps    Assessment:   Pt overall tolerated added treatment with the R knee with minimal issues or pain. Progressing well this week.     Plan:   Recheck next visit     Goals:  Active       PT Problem       Reduce pain at worst to 2/10 with all prior activity.  (Progressing)       Start:  10/22/24    Expected End:  11/06/24            Pt to ambulate with correct gait pattern R LE FWB with no A.D for community distances.  (Met)       Start:  10/22/24    Expected End:  11/06/24    Resolved:  11/12/24         Reduce pain at worst to 0/10 with all prior activity.  (Progressing)       Start:  10/22/24    Expected End:  11/21/24            Increase knee flexion to 130 degrees and ext to 0 degrees for gait, stairs, and ADL's.  (Progressing)       Start:  10/22/24    Expected End:  12/03/24            Pt to increase LE strength to grossly  5/5 for improved gait, stairs, lifting and ADL's.        Start:  10/22/24    Expected End:  12/03/24            Pt to score an increase of 10 points or > on LEFS to display overall increased function.         Start:  10/22/24    Expected End:  12/03/24            Pt to be independent with a HEP for self management.        Start:  10/22/24    Expected End:  12/03/24

## 2024-11-25 ENCOUNTER — APPOINTMENT (OUTPATIENT)
Dept: ORTHOPEDIC SURGERY | Facility: CLINIC | Age: 69
End: 2024-11-25
Payer: MEDICARE

## 2024-11-25 VITALS — WEIGHT: 198.41 LBS | BODY MASS INDEX: 28.41 KG/M2 | HEIGHT: 70 IN

## 2024-11-25 DIAGNOSIS — S83.241D ACUTE MEDIAL MENISCUS TEAR OF RIGHT KNEE, SUBSEQUENT ENCOUNTER: Primary | ICD-10-CM

## 2024-11-25 PROCEDURE — 99024 POSTOP FOLLOW-UP VISIT: CPT | Performed by: STUDENT IN AN ORGANIZED HEALTH CARE EDUCATION/TRAINING PROGRAM

## 2024-11-25 PROCEDURE — 4010F ACE/ARB THERAPY RXD/TAKEN: CPT | Performed by: STUDENT IN AN ORGANIZED HEALTH CARE EDUCATION/TRAINING PROGRAM

## 2024-11-25 PROCEDURE — 3008F BODY MASS INDEX DOCD: CPT | Performed by: STUDENT IN AN ORGANIZED HEALTH CARE EDUCATION/TRAINING PROGRAM

## 2024-11-25 PROCEDURE — 3051F HG A1C>EQUAL 7.0%<8.0%: CPT | Performed by: STUDENT IN AN ORGANIZED HEALTH CARE EDUCATION/TRAINING PROGRAM

## 2024-11-25 PROCEDURE — 1125F AMNT PAIN NOTED PAIN PRSNT: CPT | Performed by: STUDENT IN AN ORGANIZED HEALTH CARE EDUCATION/TRAINING PROGRAM

## 2024-11-25 PROCEDURE — 1036F TOBACCO NON-USER: CPT | Performed by: STUDENT IN AN ORGANIZED HEALTH CARE EDUCATION/TRAINING PROGRAM

## 2024-11-25 PROCEDURE — 1123F ACP DISCUSS/DSCN MKR DOCD: CPT | Performed by: STUDENT IN AN ORGANIZED HEALTH CARE EDUCATION/TRAINING PROGRAM

## 2024-11-25 PROCEDURE — 1159F MED LIST DOCD IN RCRD: CPT | Performed by: STUDENT IN AN ORGANIZED HEALTH CARE EDUCATION/TRAINING PROGRAM

## 2024-11-25 PROCEDURE — 1160F RVW MEDS BY RX/DR IN RCRD: CPT | Performed by: STUDENT IN AN ORGANIZED HEALTH CARE EDUCATION/TRAINING PROGRAM

## 2024-11-25 RX ORDER — MELOXICAM 15 MG/1
15 TABLET ORAL DAILY
Qty: 30 TABLET | Refills: 1 | Status: SHIPPED | OUTPATIENT
Start: 2024-11-25 | End: 2025-01-24

## 2024-11-25 ASSESSMENT — PAIN - FUNCTIONAL ASSESSMENT: PAIN_FUNCTIONAL_ASSESSMENT: 0-10

## 2024-11-25 ASSESSMENT — PAIN SCALES - GENERAL: PAINLEVEL_OUTOF10: 3

## 2024-11-25 NOTE — PROGRESS NOTES
PRIMARY CARE PHYSICIAN: Keely Bradley MD    ORTHOPAEDIC POSTOPERATIVE VISIT    ASSESSMENT & PLAN    Impression: 69 y.o. male 5.5 weeks postop s/p Right knee arthroscopy, partial medial meniscectomy, intra-articular debridement and synovectomy, chondroplasty done 10/18/2024.    Plan:   Overall Raphael is doing well.  However, he has some residual inflammation in the knee.  I provided him with a prescription for meloxicam as an anti-inflammatory.  He will continue to work with physical therapy through the postoperative protocol for the above.  He may progress his activities as he tolerates.  We discussed his postoperative precautions and he expressed understanding.  He will ice and rest the knee as he needs.  He will follow-up with us as needed.    I reviewed their postoperative timeline and plan with them. They understand the postoperative precautions and the treatment plan going forward.     Follow-Up: Patient will follow-up as needed  At the end of the visit, all questions were answered in full. The patient is in agreement with the plan and recommendations. They will call the office with any questions/concerns.    Note dictated with musiXmatch software. Completed without full typed error editing and sent to avoid delay.    SUBJECTIVE  CHIEF COMPLAINT:   Chief Complaint   Patient presents with    Right Knee - Post-op        HPI: Raphael Crain is a 69 y.o. patient who is now 5.5 weeks postop s/p Right knee arthroscopy, partial medial meniscectomy, intra-articular debridement and synovectomy, chondroplasty done 10/18/2024. Pain is controlled with the current treatment plan. He still has pain on the medial side of the knee. He is still in physical therapy at [facility].     10/28/2024 HPI Visit Info:   He is overall doing well. He is ambulating with a cane. He is currently in physical therapy at [facility]. He states the pain he had prior to surgery has resolved but still has some soreness  localized to the medial aspect of the right knee. He has no concerns.    REVIEW OF SYSTEMS  Constitutional: See HPI for pain assessment, No significant weight loss, recent trauma  Cardiovascular: No chest pain, shortness of breath  Respiratory: No difficulty breathing, cough  Gastrointestinal: No nausea, vomiting, diarrhea, constipation  Musculoskeletal: Noted in HPI, positive for pain, restricted motion, stiffness  Integumentary: No rashes, easy bruising, redness   Neurological: no numbness or tingling in extremities, no gait disturbances   Psychiatric: No mood changes, memory changes, social issues  Heme/Lymph: no excessive swelling, easy bruising, excessive bleeding  ENT: No hearing changes  Eyes: No vision changes    CURRENT MEDICATIONS:   Current Outpatient Medications   Medication Sig Dispense Refill    amLODIPine (Norvasc) 5 mg tablet Take 1 tablet (5 mg) by mouth once daily. 90 tablet 3    aspirin ([place]) 81 mg capsule Take 81 mg by mouth once daily.      cyanocobalamin (Vitamin B-12) 1,000 mcg tablet Take 1 tablet (1,000 mcg) by mouth once daily.      gabapentin (Neurontin) 300 mg capsule Take 1 capsule (300 mg) by mouth 3 times a day. (Patient taking differently: Take 1 capsule (300 mg) by mouth 3 times a day. Takes only 2 x daily) 90 capsule 3    levothyroxine (Synthroid, Levoxyl) 150 mcg tablet Take 1 tablet (150 mcg) by mouth once daily. 90 tablet 3    losartan (Cozaar) 100 mg tablet Take 1 tablet (100 mg) by mouth once daily. 90 tablet 1    metFORMIN (Glucophage) 500 mg tablet Take 1 tablet (500 mg) by mouth 2 times daily (morning and late afternoon). 180 tablet 1    metoprolol succinate XL (Toprol-XL) 50 mg 24 hr tablet Take 1 tablet (50 mg) by mouth once daily. 90 tablet 3    omeprazole (PriLOSEC) 40 mg DR capsule Take 1 capsule (40 mg) by mouth once daily. 90 capsule 1    pravastatin (Pravachol) 40 mg tablet Take 1 tablet (40 mg) by mouth once daily. 90 tablet 1     No current  "facility-administered medications for this visit.        OBJECTIVE    PHYSICAL EXAM      10/18/2024     8:40 AM 10/18/2024    10:40 AM 10/18/2024    10:55 AM 10/18/2024    11:10 AM 10/18/2024    11:12 AM 10/18/2024    11:38 AM 10/28/2024     9:46 AM   Vitals   Systolic 152 117 135 150 146 162    Diastolic 85 73 88 87 80 92    Heart Rate 68 65 67 68 75 66    Temp 36.4 °C (97.5 °F) 36.3 °C (97.3 °F)  36.4 °C (97.5 °F) 36.5 °C (97.7 °F) 36 °C (96.8 °F)    Resp 16 12 13 14 18 18    Height 1.78 m (5' 10.08\")      1.78 m (5' 10.08\")   Weight (lb) 198.41      198.41   BMI 28.41 kg/m2      28.41 kg/m2   BSA (m2) 2.11 m2      2.11 m2   Visit Report       Report      There is no height or weight on file to calculate BMI.    General: Well-appearing, no acute distress    Skin intact bilateral upper and lower extremities  No erythema  No warmth    Detailed examination of right knee demonstrates:  Surgical incisions well-healed  No erythema or warmth  No ecchymosis  Trace effusion  Resolving swelling, minimal  Knee range of motion: 0-0-130  Mild to moderate early quadriceps inhibition and trace atrophy  Patella mobility 1+ medial and lateral  Lower extremity motor grossly intact  L4-S1 sensation intact bilaterally  2+ DP/PT pulses bilaterally  Warm and well-perfused, brisk capillary refill    IMAGING:   No new imaging  "

## 2024-11-26 ENCOUNTER — TREATMENT (OUTPATIENT)
Dept: PHYSICAL THERAPY | Facility: CLINIC | Age: 69
End: 2024-11-26
Payer: MEDICARE

## 2024-11-26 DIAGNOSIS — S83.241D ACUTE MEDIAL MENISCUS TEAR OF RIGHT KNEE, SUBSEQUENT ENCOUNTER: ICD-10-CM

## 2024-11-26 DIAGNOSIS — R26.2 DIFFICULTY WALKING: ICD-10-CM

## 2024-11-26 DIAGNOSIS — M25.661 KNEE STIFF, RIGHT: ICD-10-CM

## 2024-11-26 PROCEDURE — 97110 THERAPEUTIC EXERCISES: CPT | Mod: GP | Performed by: PHYSICAL THERAPIST

## 2024-11-26 PROCEDURE — 97140 MANUAL THERAPY 1/> REGIONS: CPT | Mod: GP | Performed by: PHYSICAL THERAPIST

## 2024-11-26 ASSESSMENT — PAIN SCALES - GENERAL: PAINLEVEL_OUTOF10: 1

## 2024-11-26 ASSESSMENT — PAIN - FUNCTIONAL ASSESSMENT: PAIN_FUNCTIONAL_ASSESSMENT: 0-10

## 2024-11-26 NOTE — PROGRESS NOTES
"Physical Therapy  Physical Therapy Treatment/Progress Report    Patient Name: Raphael Crain  MRN: [id number]  Today's Date: 11/26/2024  Time Calculation  Start Time: 1445  Stop Time: 1530  Time Calculation (min): 45 min  PT Therapeutic Procedures Time Entry  Manual Therapy Time Entry: 10  Therapeutic Exercise Time Entry: 35  Payor: HUMANA MEDICARE / Plan: HUMANA GOLD CHOICE / Product Type: *No Product type* /     Reason for Referral: S/P menisectomy  Referred By: Justin  General Comment: Visit 8 of 10 ( auth exp 12-25-24)    Current Problem    Problem List Items Addressed This Visit    None  Visit Diagnoses         Codes    Acute medial meniscus tear of right knee, subsequent encounter     S83.241D    Difficulty walking     R26.2    Knee stiff, right     M25.661           Subjective   Pt overall notes still some mild medial knee pain but did see ortho and they had no concern and recommended to continue with PT per rehab protocol. Pt feels he is progressing with is knee very well.   Compliance with HEP-yes    Precautions  Precautions  Precautions Comment: post op protocol    Pain  Pain Assessment: 0-10  0-10 (Numeric) Pain Score: 1  Pain Location: Knee  Pain Orientation: Right    Objective   Lower Extremity Strength:  MMT 5/5 max  RIGHT LEFT   Hip Flexion 4+ 5   Hip Extension 4+ 5   Hip Abduction 5 5   Hip Adduction 5 5   Knee Extension 4+ 5   Knee Flexion 4+ 5   Ankle DF 5 5   Ankle PF 5 5   Ankle INV 5 5   Ankle EV 5 5     Lower Extremity ROM: WNL unless documented below:  ROM in Degrees  RIGHT LEFT   Knee Extension 0 0   Knee Flexion 123 130     Girth: 41 mid jt R knee, 39 cm L knee mid jt  Gait mechanics: No A.D. independent     Other Measures  Lower Extremity Funtional Score (LEFS): 56/80     Treatments:  There-ex: x 35 min   Nu-step x 5 min  HS stretch x 1 min   Calf stretch x 1 min   1/2 foam calf raises 2 x 10   1/2 foam toe raises 2 x 10  3 way hip RTB t-band 2 x 10 ea B LE  8\" step up forward 2 x 10 R " "LE  8\" step up lateral 2 x 10 R LE  Reverse step up 8\" R LE on step 2 x 10   TKE GTB 2 x 10 R LE   Airex March x 1 min   Airex tandem stand x 1 min ea  Hurdles lateral 6\" 10 ft R/L x 2   Hurdles forward 6\" 2 x 10   Mini lunge 2 x 10 R LE  Stool Scoots 20 ft x 4     Manual Tx: x 10 min   STM to R medial knee   Patella mobs all ranges grade 3   Knee flexion/ext mobs grade 3-4    Informed consent given on manual treatment. Pt given opportunity to cease treatment at any time. Educated pt on expected soreness, possible ecchymosis. Pt voiced understanding  No adverse effects were noted post tx.      Current HEP:  Access Code: [email or web address]  URL: [email or web address]  Date: 11/12/2024  Prepared by: LEN Lau    Exercises  - Supine Quad Set (Mirrored)  - 1 x daily - 7 x weekly - 2 sets - 10 reps - 5 sec hold  - Supine Active Straight Leg Raise  - 1 x daily - 7 x weekly - 2 sets - 10 reps  - Supine Hip Adduction Isometric with Ball  - 1 x daily - 7 x weekly - 2 sets - 10 reps  - Supine Heel Slide with Strap (Mirrored)  - 1 x daily - 7 x weekly - 2 sets - 10 reps  - Seated Hamstring Stretch  - 1 x daily - 7 x weekly - 1 sets - 3 reps - 20 sec hold  - Seated Calf Stretch with Strap  - 1 x daily - 7 x weekly - 1 sets - 3 reps - 20 sec hold  - Standing Terminal Knee Extension with Resistance  - 1 x daily - 7 x weekly - 2 sets - 10 reps  - Hip Abduction with Resistance Loop  - 1 x daily - 7 x weekly - 2 sets - 10 reps  - Hip Extension with Resistance Loop  - 1 x daily - 7 x weekly - 2 sets - 10 reps  - Standing Hip Flexion with Resistance Loop  - 1 x daily - 7 x weekly - 2 sets - 10 reps    Assessment:   Pt overall progressing well with his R knee with reduced pain and improved ROM and strength a swell as gait/balance. Progressing well towards LTG's at this time and has met over 50% of his LTG's at this time. Pt would benefit from continued PT tx for 4 more weeks ot progress to full goal attainment and full " prior level of function.     Plan:  OP PT Plan  Treatment/Interventions: Therapeutic activities, Therapeutic exercises, Gait training, Neuromuscular re-education  PT Plan: Skilled PT  PT Frequency: 2 times per week  Duration: 4 weeks  Rehab Potential: Good  Plan of Care Agreement: Patient    Goals:  Active       PT Problem       Reduce pain at worst to 2/10 with all prior activity.  (Progressing)       Start:  10/22/24    Expected End:  11/06/24            Pt to ambulate with correct gait pattern R LE FWB with no A.D for community distances.  (Met)       Start:  10/22/24    Expected End:  11/06/24    Resolved:  11/12/24         Reduce pain at worst to 0/10 with all prior activity.  (Progressing)       Start:  10/22/24    Expected End:  11/21/24            Increase knee flexion to 130 degrees and ext to 0 degrees for gait, stairs, and ADL's.  (Progressing)       Start:  10/22/24    Expected End:  12/03/24            Pt to increase LE strength to grossly 5/5 for improved gait, stairs, lifting and ADL's.        Start:  10/22/24    Expected End:  12/03/24            Pt to score an increase of 10 points or > on LEFS to display overall increased function.         Start:  10/22/24    Expected End:  12/03/24            Pt to be independent with a HEP for self management.        Start:  10/22/24    Expected End:  12/03/24

## 2024-12-03 ENCOUNTER — TREATMENT (OUTPATIENT)
Dept: PHYSICAL THERAPY | Facility: CLINIC | Age: 69
End: 2024-12-03
Payer: MEDICARE

## 2024-12-03 DIAGNOSIS — M25.661 KNEE STIFF, RIGHT: ICD-10-CM

## 2024-12-03 DIAGNOSIS — S83.241D ACUTE MEDIAL MENISCUS TEAR OF RIGHT KNEE, SUBSEQUENT ENCOUNTER: ICD-10-CM

## 2024-12-03 DIAGNOSIS — R26.2 DIFFICULTY WALKING: ICD-10-CM

## 2024-12-03 PROCEDURE — 97110 THERAPEUTIC EXERCISES: CPT | Mod: GP | Performed by: PHYSICAL THERAPIST

## 2024-12-03 ASSESSMENT — PAIN - FUNCTIONAL ASSESSMENT: PAIN_FUNCTIONAL_ASSESSMENT: 0-10

## 2024-12-03 ASSESSMENT — PAIN SCALES - GENERAL: PAINLEVEL_OUTOF10: 2

## 2024-12-03 NOTE — PROGRESS NOTES
"Physical Therapy    Physical Therapy Treatment    Patient Name: Raphael Crain  MRN: [id number]  Today's Date: 12/3/2024  Time Calculation  Start Time: 1448  Stop Time: 1530  Time Calculation (min): 42 min     PT Therapeutic Procedures Time Entry  Therapeutic Exercise Time Entry: 42                 Payor: HUMANA MEDICARE / Plan: HUMANA GOLD CHOICE / Product Type: *No Product type* /     Reason for Referral: S/P menisectomy  Referred By: Justin  General Comment: Visit 10 of 15 ( auth exp 12-31-24)    Current Problem    Problem List Items Addressed This Visit    None  Visit Diagnoses         Codes    Acute medial meniscus tear of right knee, subsequent encounter     S83.241D    Difficulty walking     R26.2    Knee stiff, right     M25.661             Subjective   Pt notes he is doing well today but did have some increased pain overnight last night. Feels overall he continues to improve.   Compliance with HEP-yes    Precautions  Precautions  Precautions Comment: post op protocol    Pain  Pain Assessment: 0-10  0-10 (Numeric) Pain Score: 2  Pain Location: Knee  Pain Orientation: Right      Objective   No measures     Treatments:  There-ex: x 42 min   Nu-step x 5 min  HS stretch x 1 min   Calf stretch x 1 min   1/2 foam calf raises 2 x 10   1/2 foam toe raises 2 x 10  3 way hip RTB t-band 2 x 10 ea B LE  8\" step up forward 2 x 10 R LE  8\" step up lateral 2 x 10 R LE  Reverse step up 8\" R LE on step 2 x 10   TKE GTB 2 x 10 R LE   Airex March x 1 min   Airex tandem stand x 1 min ea  Hurdles lateral 6\" 10 ft R/L x 2   Hurdles forward 6\" 2 x 10   Mini lunge 2 x 10 R LE  Stool Scoots 20 ft x 4   Pistol squat 10 x R LE     Manual Tx: x -held today   STM to R medial knee   Patella mobs all ranges grade 3   Knee flexion/ext mobs grade 3-4    Informed consent given on manual treatment. Pt given opportunity to cease treatment at any time. Educated pt on expected soreness, possible ecchymosis. Pt voiced understanding  No adverse " effects were noted post tx.      Current HEP:  Access Code: [email or web address]  URL: [email or web address]  Date: 11/12/2024  Prepared by:  Sid    Exercises  - Supine Quad Set (Mirrored)  - 1 x daily - 7 x weekly - 2 sets - 10 reps - 5 sec hold  - Supine Active Straight Leg Raise  - 1 x daily - 7 x weekly - 2 sets - 10 reps  - Supine Hip Adduction Isometric with Ball  - 1 x daily - 7 x weekly - 2 sets - 10 reps  - Supine Heel Slide with Strap (Mirrored)  - 1 x daily - 7 x weekly - 2 sets - 10 reps  - Seated Hamstring Stretch  - 1 x daily - 7 x weekly - 1 sets - 3 reps - 20 sec hold  - Seated Calf Stretch with Strap  - 1 x daily - 7 x weekly - 1 sets - 3 reps - 20 sec hold  - Standing Terminal Knee Extension with Resistance  - 1 x daily - 7 x weekly - 2 sets - 10 reps  - Hip Abduction with Resistance Loop  - 1 x daily - 7 x weekly - 2 sets - 10 reps  - Hip Extension with Resistance Loop  - 1 x daily - 7 x weekly - 2 sets - 10 reps  - Standing Hip Flexion with Resistance Loop  - 1 x daily - 7 x weekly - 2 sets - 10 reps    Assessment:   Pt overall tolerated treatment well today and added pistol squat. Overall improved pain today and improving strength.     Plan:   Continue 1x/wk x 4-5 more visits     Goals:  Active       PT Problem       Reduce pain at worst to 2/10 with all prior activity.  (Met)       Start:  10/22/24    Expected End:  11/06/24    Resolved:  11/26/24         Pt to ambulate with correct gait pattern R LE FWB with no A.D for community distances.  (Met)       Start:  10/22/24    Expected End:  11/06/24    Resolved:  11/12/24         Reduce pain at worst to 0/10 with all prior activity.  (Progressing)       Start:  10/22/24    Expected End:  12/31/24            Increase knee flexion to 130 degrees and ext to 0 degrees for gait, stairs, and ADL's.  (Progressing)       Start:  10/22/24    Expected End:  12/31/24            Pt to increase LE strength to grossly 5/5 for improved gait,  stairs, lifting and ADL's.  (Progressing)       Start:  10/22/24    Expected End:  12/31/24            Pt to score an increase of 10 points or > on LEFS to display overall increased function.   (Progressing)       Start:  10/22/24    Expected End:  12/31/24            Pt to be independent with a HEP for self management.  (Progressing)       Start:  10/22/24    Expected End:  12/31/24

## 2024-12-05 ENCOUNTER — APPOINTMENT (OUTPATIENT)
Dept: PHYSICAL THERAPY | Facility: CLINIC | Age: 69
End: 2024-12-05
Payer: MEDICARE

## 2024-12-10 ENCOUNTER — TREATMENT (OUTPATIENT)
Dept: PHYSICAL THERAPY | Facility: CLINIC | Age: 69
End: 2024-12-10
Payer: MEDICARE

## 2024-12-10 DIAGNOSIS — R26.2 DIFFICULTY WALKING: Primary | ICD-10-CM

## 2024-12-10 DIAGNOSIS — M25.661 KNEE STIFF, RIGHT: ICD-10-CM

## 2024-12-10 DIAGNOSIS — S83.241D ACUTE MEDIAL MENISCUS TEAR OF RIGHT KNEE, SUBSEQUENT ENCOUNTER: ICD-10-CM

## 2024-12-10 PROCEDURE — 97110 THERAPEUTIC EXERCISES: CPT | Mod: GP | Performed by: PHYSICAL THERAPIST

## 2024-12-10 ASSESSMENT — PAIN - FUNCTIONAL ASSESSMENT: PAIN_FUNCTIONAL_ASSESSMENT: 0-10

## 2024-12-10 ASSESSMENT — PAIN SCALES - GENERAL: PAINLEVEL_OUTOF10: 1

## 2024-12-10 NOTE — PROGRESS NOTES
"Physical Therapy    Physical Therapy Treatment    Patient Name: Raphael Crain  MRN: [id number]  Today's Date: 12/10/2024  Time Calculation  Start Time: 1445  Stop Time: 1530  Time Calculation (min): 45 min     PT Therapeutic Procedures Time Entry  Therapeutic Exercise Time Entry: 45                 Payor: [employer] MEDICARE / Plan: HUMANA GOLD CHOICE / Product Type: *No Product type* /     Reason for Referral: S/P menisectomy  Referred By: Justin  General Comment: Visit 11 of 15 (auth exp 12-31-24)    Current Problem    Problem List Items Addressed This Visit    None  Visit Diagnoses         Codes    Difficulty walking    -  Primary R26.2    Knee stiff, right     M25.661    Acute medial meniscus tear of right knee, subsequent encounter     S83.487D             Subjective   Pt reports his knee has been feeling much better since using meloxicam.   Compliance with HEP-yes    Precautions  Precautions  Precautions Comment: post op protocol    Pain  Pain Assessment: 0-10  0-10 (Numeric) Pain Score: 1  Pain Location: Knee  Pain Orientation: Right    Objective   No measures today     Treatments:  There-ex: x 42 min   Nu-step x 5 min  HS stretch x 1 min   Calf stretch x 1 min   1/2 foam calf raises 2 x 10   1/2 foam toe raises 2 x 10  3 way hip GTB t-band 2 x 10 ea B LE  8\" step up forward 2 x 10 R LE  8\" step up lateral 2 x 10 R LE  Reverse step up 8\" R LE on step 2 x 10   TKE BTB 2 x 10 R LE   Airex March x 1 min   Airex tandem stand x 1 min ea  Mini lunge 2 x 10 R LE  Stool Scoots 20 ft x 4   Pistol squat 10 x R LE   Inch worms RTB 20 ft R/L   Monster walk RTB 20 ft for/back     Current HEP:  Access Code: [email or web address]  URL: [email or web address]  Date: 11/12/2024  Prepared by: LEN Lau    Exercises  - Supine Quad Set (Mirrored)  - 1 x daily - 7 x weekly - 2 sets - 10 reps - 5 sec hold  - Supine Active Straight Leg Raise  - 1 x daily - 7 x weekly - 2 sets - 10 reps  - Supine Hip Adduction Isometric with " Ball  - 1 x daily - 7 x weekly - 2 sets - 10 reps  - Supine Heel Slide with Strap (Mirrored)  - 1 x daily - 7 x weekly - 2 sets - 10 reps  - Seated Hamstring Stretch  - 1 x daily - 7 x weekly - 1 sets - 3 reps - 20 sec hold  - Seated Calf Stretch with Strap  - 1 x daily - 7 x weekly - 1 sets - 3 reps - 20 sec hold  - Standing Terminal Knee Extension with Resistance  - 1 x daily - 7 x weekly - 2 sets - 10 reps  - Hip Abduction with Resistance Loop  - 1 x daily - 7 x weekly - 2 sets - 10 reps  - Hip Extension with Resistance Loop  - 1 x daily - 7 x weekly - 2 sets - 10 reps  - Standing Hip Flexion with Resistance Loop  - 1 x daily - 7 x weekly - 2 sets - 10 reps    Assessment:   Overall pt is doing well with his therapy and has progressed well over the past week. Noted a good reduction in pain and improvement with his strength.      Plan:   Pt to cont 3 more visits then Discharge to [facility]     Goals:  Active       PT Problem       Reduce pain at worst to 2/10 with all prior activity.  (Met)       Start:  10/22/24    Expected End:  11/06/24    Resolved:  11/26/24         Pt to ambulate with correct gait pattern R LE FWB with no A.D for community distances.  (Met)       Start:  10/22/24    Expected End:  11/06/24    Resolved:  11/12/24         Reduce pain at worst to 0/10 with all prior activity.  (Progressing)       Start:  10/22/24    Expected End:  12/31/24            Increase knee flexion to 130 degrees and ext to 0 degrees for gait, stairs, and ADL's.  (Progressing)       Start:  10/22/24    Expected End:  12/31/24            Pt to increase LE strength to grossly 5/5 for improved gait, stairs, lifting and ADL's.  (Progressing)       Start:  10/22/24    Expected End:  12/31/24            Pt to score an increase of 10 points or > on LEFS to display overall increased function.   (Progressing)       Start:  10/22/24    Expected End:  12/31/24            Pt to be independent with a HEP for self management.  (Progressing)        Start:  10/22/24    Expected End:  12/31/24

## 2024-12-17 ENCOUNTER — TREATMENT (OUTPATIENT)
Dept: PHYSICAL THERAPY | Facility: CLINIC | Age: 69
End: 2024-12-17
Payer: MEDICARE

## 2024-12-17 DIAGNOSIS — R26.2 DIFFICULTY WALKING: ICD-10-CM

## 2024-12-17 DIAGNOSIS — M25.661 KNEE STIFF, RIGHT: ICD-10-CM

## 2024-12-17 DIAGNOSIS — S83.241D ACUTE MEDIAL MENISCUS TEAR OF RIGHT KNEE, SUBSEQUENT ENCOUNTER: ICD-10-CM

## 2024-12-17 PROCEDURE — 97110 THERAPEUTIC EXERCISES: CPT | Mod: GP | Performed by: PHYSICAL THERAPIST

## 2024-12-17 ASSESSMENT — PAIN SCALES - GENERAL: PAINLEVEL_OUTOF10: 1

## 2024-12-17 ASSESSMENT — PAIN - FUNCTIONAL ASSESSMENT: PAIN_FUNCTIONAL_ASSESSMENT: 0-10

## 2024-12-17 NOTE — PROGRESS NOTES
"Physical Therapy    Physical Therapy Treatment    Patient Name: Raphael Crain  MRN: [id number]  Today's Date: 12/17/2024  Time Calculation  Start Time: 0700  Stop Time: 0745  Time Calculation (min): 45 min     PT Therapeutic Procedures Time Entry  Therapeutic Exercise Time Entry: 45                 Payor: HUMANA MEDICARE / Plan: HUMANA GOLD CHOICE / Product Type: *No Product type* /     Reason for Referral: S/P menisectomy  Referred By: Justin  General Comment: Visit 12 of 15 (auth exp 12-31-24)    Current Problem    Problem List Items Addressed This Visit    None  Visit Diagnoses         Codes    Acute medial meniscus tear of right knee, subsequent encounter     S83.241D    Difficulty walking     R26.2    Knee stiff, right     M25.661           Subjective   Pt reports his back is bothering him than his knee. Notes R knee is doing well   Compliance with HEP-yes    Precautions  Precautions  STEADI Fall Risk Score (The score of 4 or more indicates an increased risk of falling): no change  Precautions Comment: post op protocol    Pain  Pain Assessment: 0-10  0-10 (Numeric) Pain Score: 1  Pain Location: Knee  Pain Orientation: Right    Objective   No measures     Treatments:  There-ex: x 42 min   Nu-step x 5 min  HS stretch x 1 min   Calf stretch x 1 min   1/2 foam calf raises 2 x 10   1/2 foam toe raises 2 x 10  3 way hip GTB t-band 2 x 10 ea B LE  8\" step up forward 2 x 10 R LE  8\" step up lateral 2 x 10 R LE  Reverse step up 8\" R LE on step 2 x 10   TKE BTB 2 x 10 R LE   Airex March x 1 min   Airex tandem stand x 1 min ea  Mini lunge 2 x 10 R LE  Stool Scoots 20 ft x 4   Pistol squat 10 x R LE   Inch worms GTB 20 ft R/L   Monster walk GTB 20 ft for/back   Golfers lift, RDL B LE YKB 2 x 10   Goblet squat to 8\" step RKN    Current HEP:  Access Code: [email or web address]  URL: [email or web address]/  Date: 11/12/2024  Prepared by: LEN Lau    Exercises  - Supine Quad Set (Mirrored)  - 1 x daily - 7 x " weekly - 2 sets - 10 reps - 5 sec hold  - Supine Active Straight Leg Raise  - 1 x daily - 7 x weekly - 2 sets - 10 reps  - Supine Hip Adduction Isometric with Ball  - 1 x daily - 7 x weekly - 2 sets - 10 reps  - Supine Heel Slide with Strap (Mirrored)  - 1 x daily - 7 x weekly - 2 sets - 10 reps  - Seated Hamstring Stretch  - 1 x daily - 7 x weekly - 1 sets - 3 reps - 20 sec hold  - Seated Calf Stretch with Strap  - 1 x daily - 7 x weekly - 1 sets - 3 reps - 20 sec hold  - Standing Terminal Knee Extension with Resistance  - 1 x daily - 7 x weekly - 2 sets - 10 reps  - Hip Abduction with Resistance Loop  - 1 x daily - 7 x weekly - 2 sets - 10 reps  - Hip Extension with Resistance Loop  - 1 x daily - 7 x weekly - 2 sets - 10 reps  - Standing Hip Flexion with Resistance Loop  - 1 x daily - 7 x weekly - 2 sets - 10 reps    Assessment:   Pt overall did well with treatment today and added exercises with no issues.  Some discomfort low back with RDL's and squats but improved with VC's for correct posture.     Plan:   2 more visits then discontinue to HEP.     Goals:  Active       PT Problem       Reduce pain at worst to 2/10 with all prior activity.  (Met)       Start:  10/22/24    Expected End:  11/06/24    Resolved:  11/26/24         Pt to ambulate with correct gait pattern R LE FWB with no A.D for community distances.  (Met)       Start:  10/22/24    Expected End:  11/06/24    Resolved:  11/12/24         Reduce pain at worst to 0/10 with all prior activity.  (Progressing)       Start:  10/22/24    Expected End:  12/31/24            Increase knee flexion to 130 degrees and ext to 0 degrees for gait, stairs, and ADL's.  (Progressing)       Start:  10/22/24    Expected End:  12/31/24            Pt to increase LE strength to grossly 5/5 for improved gait, stairs, lifting and ADL's.  (Progressing)       Start:  10/22/24    Expected End:  12/31/24            Pt to score an increase of 10 points or > on LEFS to display overall  increased function.   (Progressing)       Start:  10/22/24    Expected End:  12/31/24            Pt to be independent with a HEP for self management.  (Progressing)       Start:  10/22/24    Expected End:  12/31/24

## 2024-12-23 ENCOUNTER — TREATMENT (OUTPATIENT)
Dept: PHYSICAL THERAPY | Facility: CLINIC | Age: 69
End: 2024-12-23
Payer: MEDICARE

## 2024-12-23 DIAGNOSIS — M25.661 KNEE STIFF, RIGHT: ICD-10-CM

## 2024-12-23 DIAGNOSIS — R26.2 DIFFICULTY WALKING: ICD-10-CM

## 2024-12-23 DIAGNOSIS — S83.241D ACUTE MEDIAL MENISCUS TEAR OF RIGHT KNEE, SUBSEQUENT ENCOUNTER: Primary | ICD-10-CM

## 2024-12-23 PROCEDURE — 97110 THERAPEUTIC EXERCISES: CPT | Mod: GP,CQ

## 2024-12-23 ASSESSMENT — PAIN SCALES - GENERAL: PAINLEVEL_OUTOF10: 3

## 2024-12-23 ASSESSMENT — PAIN - FUNCTIONAL ASSESSMENT: PAIN_FUNCTIONAL_ASSESSMENT: 0-10

## 2024-12-23 NOTE — PROGRESS NOTES
"Physical Therapy    Physical Therapy Treatment    Patient Name: Raphael Crain  MRN: [id number]  Today's Date: 12/23/2024  Time Calculation  Start Time: 1750  Stop Time: 1830  Time Calculation (min): 40 min     PT Therapeutic Procedures Time Entry  Therapeutic Exercise Time Entry: 40                 Payor: HU[employer]NA MEDICARE / Plan: HUMANA GOLD CHOICE / Product Type: *No Product type* /     Reason for Referral: S/P menisectomy  Referred By: Justin  General Comment: Visit 13 of 15 (auth exp 12-31-24)    Current Problem    Problem List Items Addressed This Visit    None  Visit Diagnoses         Codes    Acute medial meniscus tear of right knee, subsequent encounter    -  Primary S83.241D    Difficulty walking     R26.2    Knee stiff, right     M25.661             Subjective   Knee is sore today but still reports he is doing well.   He did a lot of walking on Saturday while shopping and knee woke him up in pain that evening.   Compliance with HEP-yes    Precautions  Precautions  Precautions Comment: post op protocol    Pain  Pain Assessment: 0-10  0-10 (Numeric) Pain Score: 3  Pain Location: Knee  Pain Orientation: Right    Objective   No measures     Treatments:  There-ex: x 42 min   Nu-step x 5 min  HS stretch x 1 min   Calf stretch x 1 min   1/2 foam calf raises 2 x 10   1/2 foam toe raises 2 x 10  3 way hip GTB t-band 2 x 10 ea B LE  8\" step up forward 2 x 10 R LE held   8\" step up lateral 2 x 10 R LE held   Reverse step up 8\" R LE on step 2 x 10 no UE support   TKE BTB 2 x 10 R LE   Airex March x 1 min   Airex tandem stand x 1 min ea  R SLS 2 x30 sec  Mini lunge 2 x 10 R LE  Stool Scoots 20 ft x 4   Pistol squat 10 x R LE   Inch worms GTB 20 ft x2 R/L   Monster walk GTB 20 ft x2 for/back   Golfers lift, RDL B LE YKB 2 x 10   Goblet squat to 8\" step RKB    Current HEP:  Access Code: [email or web address]  URL: [email or web address]  Date: 11/12/2024  Prepared by: LEN Grossman  - Supine Quad Set " (Mirrored)  - 1 x daily - 7 x weekly - 2 sets - 10 reps - 5 sec hold  - Supine Active Straight Leg Raise  - 1 x daily - 7 x weekly - 2 sets - 10 reps  - Supine Hip Adduction Isometric with Ball  - 1 x daily - 7 x weekly - 2 sets - 10 reps  - Supine Heel Slide with Strap (Mirrored)  - 1 x daily - 7 x weekly - 2 sets - 10 reps  - Seated Hamstring Stretch  - 1 x daily - 7 x weekly - 1 sets - 3 reps - 20 sec hold  - Seated Calf Stretch with Strap  - 1 x daily - 7 x weekly - 1 sets - 3 reps - 20 sec hold  - Standing Terminal Knee Extension with Resistance  - 1 x daily - 7 x weekly - 2 sets - 10 reps  - Hip Abduction with Resistance Loop  - 1 x daily - 7 x weekly - 2 sets - 10 reps  - Hip Extension with Resistance Loop  - 1 x daily - 7 x weekly - 2 sets - 10 reps  - Standing Hip Flexion with Resistance Loop  - 1 x daily - 7 x weekly - 2 sets - 10 reps    Assessment:   Pt tolerated session well without complaints of knee pain. Did require VC's for technique and posture. Some unsteadiness with reverse step ups without UE support. No pain post tx.     Plan:   Discharge next session     Goals:  Active       PT Problem       Reduce pain at worst to 2/10 with all prior activity.  (Met)       Start:  10/22/24    Expected End:  11/06/24    Resolved:  11/26/24         Pt to ambulate with correct gait pattern R LE FWB with no A.D for community distances.  (Met)       Start:  10/22/24    Expected End:  11/06/24    Resolved:  11/12/24         Reduce pain at worst to 0/10 with all prior activity.  (Progressing)       Start:  10/22/24    Expected End:  12/31/24            Increase knee flexion to 130 degrees and ext to 0 degrees for gait, stairs, and ADL's.  (Progressing)       Start:  10/22/24    Expected End:  12/31/24            Pt to increase LE strength to grossly 5/5 for improved gait, stairs, lifting and ADL's.  (Progressing)       Start:  10/22/24    Expected End:  12/31/24            Pt to score an increase of 10 points or >  on LEFS to display overall increased function.   (Progressing)       Start:  10/22/24    Expected End:  12/31/24            Pt to be independent with a HEP for self management.  (Progressing)       Start:  10/22/24    Expected End:  12/31/24

## 2024-12-31 ENCOUNTER — TREATMENT (OUTPATIENT)
Dept: PHYSICAL THERAPY | Facility: CLINIC | Age: 69
End: 2024-12-31
Payer: MEDICARE

## 2024-12-31 DIAGNOSIS — S83.241D ACUTE MEDIAL MENISCUS TEAR OF RIGHT KNEE, SUBSEQUENT ENCOUNTER: ICD-10-CM

## 2024-12-31 DIAGNOSIS — M25.661 KNEE STIFF, RIGHT: ICD-10-CM

## 2024-12-31 DIAGNOSIS — R26.2 DIFFICULTY WALKING: ICD-10-CM

## 2024-12-31 PROCEDURE — 97110 THERAPEUTIC EXERCISES: CPT | Mod: GP | Performed by: PHYSICAL THERAPIST

## 2024-12-31 ASSESSMENT — PAIN SCALES - GENERAL: PAINLEVEL_OUTOF10: 2

## 2024-12-31 ASSESSMENT — PAIN - FUNCTIONAL ASSESSMENT: PAIN_FUNCTIONAL_ASSESSMENT: 0-10

## 2024-12-31 NOTE — PROGRESS NOTES
"Physical Therapy    Physical Therapy Treatment/Discharge     Patient Name: Raphael Crain  MRN: [id number]  Today's Date: 12/31/2024  Time Calculation  Start Time: 0745  Stop Time: 0830  Time Calculation (min): 45 min     PT Therapeutic Procedures Time Entry  Therapeutic Exercise Time Entry: 45                 Payor: HUMANA MEDICARE / Plan: HUMANA GOLD CHOICE / Product Type: *No Product type* /     Reason for Referral: S/P menisectomy  Referred By: Justin  General Comment: Visit 14 of 15 (auth exp 12-31-24)    Current Problem    Problem List Items Addressed This Visit    None  Visit Diagnoses         Codes    Acute medial meniscus tear of right knee, subsequent encounter     S83.241D    Difficulty walking     R26.2    Knee stiff, right     M25.661             Subjective   Pt overall notes he is doing well with minimal knee pain and feels he is able to do all he needs to do with very little limitations.   Compliance with HEP-yes    Precautions  Precautions  STEADI Fall Risk Score (The score of 4 or more indicates an increased risk of falling): no change  Precautions Comment: post op protocol    Pain  Pain Assessment: 0-10  0-10 (Numeric) Pain Score: 2  Pain Location: Knee  Pain Orientation: Right    Objective   Lower Extremity Strength:  MMT 5/5 max  RIGHT LEFT   Hip Flexion 5 5   Hip Extension 5 5   Hip Abduction 5 5   Hip Adduction 5 5   Knee Extension 5 5   Knee Flexion 5 5   Ankle DF 5 5   Ankle PF 5 5   Ankle INV 5 5   Ankle EV 5 5     Lower Extremity ROM: WNL unless documented below:  ROM in Degrees  RIGHT LEFT   Knee Extension 0 0   Knee Flexion 130 130     Other Measures  Lower Extremity Funtional Score (LEFS): 72/80     Treatments:  There-ex: x 45 min   Nu-step x 5 min  HS stretch x 1 min   Calf stretch x 1 min   1/2 foam calf raises 2 x 10   1/2 foam toe raises 2 x 10  3 way hip GTB t-band 2 x 10 ea B LE  Reverse step up 8\" R LE on step 2 x 10 no UE support   TKE BTB 2 x 10 R LE   Airex March x 1 min " "  Airex tandem stand x 1 min ea  R SLS 2 x30 sec  Mini lunge 2 x 10 R LE  Stool Scoots 20 ft x 4   Pistol squat 10 x R LE   Inch worms GTB 20 ft x2 R/L   Monster walk GTB 20 ft x2 for/back   Golfers lift, RDL B LE YKB 2 x 10   Goblet squat to 8\" step RKB  Objective measures today     Current HEP:  Access Code: [email or web address]  URL: [email or web address]  Date: 12/31/2024  Prepared by:  Sid    Exercises  - Supine Quad Set (Mirrored)  - 1 x daily - 7 x weekly - 2 sets - 10 reps - 5 sec hold  - Supine Active Straight Leg Raise  - 1 x daily - 7 x weekly - 2 sets - 10 reps  - Supine Hip Adduction Isometric with Ball  - 1 x daily - 7 x weekly - 2 sets - 10 reps  - Supine Heel Slide with Strap (Mirrored)  - 1 x daily - 7 x weekly - 2 sets - 10 reps  - Seated Hamstring Stretch  - 1 x daily - 7 x weekly - 1 sets - 3 reps - 20 sec hold  - Seated Calf Stretch with Strap  - 1 x daily - 7 x weekly - 1 sets - 3 reps - 20 sec hold  - Standing Terminal Knee Extension with Resistance  - 1 x daily - 7 x weekly - 2 sets - 10 reps  - Hip Abduction with Resistance Loop  - 1 x daily - 7 x weekly - 2 sets - 10 reps  - Hip Extension with Resistance Loop  - 1 x daily - 7 x weekly - 2 sets - 10 reps  - Standing Hip Flexion with Resistance Loop  - 1 x daily - 7 x weekly - 2 sets - 10 reps  - Step Up  - 1 x daily - 4 x weekly - 2 sets - 10 reps  - Lateral Step Ups  - 1 x daily - 4 x weekly - 2 sets - 10 reps  - Forward Step Down  - 1 x daily - 4 x weekly - 2 sets - 10 reps  - Forward Monster Walks  - 1 x daily - 4 x weekly - 2 sets - 10 reps  - Side Stepping with Resistance at Ankles  - 1 x daily - 4 x weekly - 2 sets - 10 reps  - Partial Lunge with Chair  - 1 x daily - 4 x weekly - 2 sets - 10 reps    Assessment:  Pt overall has progressed well with therapy with reduced pain, improved AROM, improved LE strength and overall no limitations with activity with the LE's. Pt has met his goals in therapy at this time and is " recommended to discharge to his HEP today. Pt has a good prognosis going forward with his HEP performance.      Plan:   Discharge to HEP.     Goals:  Resolved       PT Problem       Reduce pain at worst to 2/10 with all prior activity.  (Met)       Start:  10/22/24    Expected End:  11/06/24    Resolved:  11/26/24         Pt to ambulate with correct gait pattern R LE FWB with no A.D for community distances.  (Met)       Start:  10/22/24    Expected End:  11/06/24    Resolved:  11/12/24         Reduce pain at worst to 0/10 with all prior activity.  (Adequate for Discharge)       Start:  10/22/24    Expected End:  12/31/24            Increase knee flexion to 130 degrees and ext to 0 degrees for gait, stairs, and ADL's.  (Met)       Start:  10/22/24    Expected End:  12/31/24    Resolved:  12/31/24         Pt to increase LE strength to grossly 5/5 for improved gait, stairs, lifting and ADL's.  (Met)       Start:  10/22/24    Expected End:  12/31/24    Resolved:  12/31/24         Pt to score an increase of 10 points or > on LEFS to display overall increased function.   (Met)       Start:  10/22/24    Expected End:  12/31/24    Resolved:  12/31/24         Pt to be independent with a HEP for self management.  (Met)       Start:  10/22/24    Expected End:  12/31/24    Resolved:  12/31/24

## 2025-03-15 LAB
ALBUMIN/CREAT UR: 6 MG/G CREAT
ALT SERPL-CCNC: 27 U/L (ref 9–46)
ANION GAP SERPL CALCULATED.4IONS-SCNC: 10 MMOL/L (CALC) (ref 7–17)
BUN SERPL-MCNC: 18 MG/DL (ref 7–25)
BUN/CREAT SERPL: ABNORMAL (CALC) (ref 6–22)
CALCIUM SERPL-MCNC: 8.9 MG/DL (ref 8.6–10.3)
CHLORIDE SERPL-SCNC: 106 MMOL/L (ref 98–110)
CHOLEST SERPL-MCNC: 155 MG/DL
CHOLEST/HDLC SERPL: 2.3 (CALC)
CO2 SERPL-SCNC: 26 MMOL/L (ref 20–32)
CREAT SERPL-MCNC: 1.06 MG/DL (ref 0.7–1.28)
CREAT UR-MCNC: 198 MG/DL (ref 20–320)
EGFRCR SERPLBLD CKD-EPI 2021: 75 ML/MIN/1.73M2
ERYTHROCYTE [DISTWIDTH] IN BLOOD BY AUTOMATED COUNT: 13.6 % (ref 11–15)
EST. AVERAGE GLUCOSE BLD GHB EST-MCNC: 177 MG/DL
EST. AVERAGE GLUCOSE BLD GHB EST-SCNC: 9.8 MMOL/L
GLUCOSE SERPL-MCNC: 151 MG/DL (ref 65–99)
HBA1C MFR BLD: 7.8 % OF TOTAL HGB
HCT VFR BLD AUTO: 44.7 % (ref 38.5–50)
HDLC SERPL-MCNC: 67 MG/DL
HGB BLD-MCNC: 14.2 G/DL (ref 13.2–17.1)
LDLC SERPL CALC-MCNC: 74 MG/DL (CALC)
MCH RBC QN AUTO: 31.1 PG (ref 27–33)
MCHC RBC AUTO-ENTMCNC: 31.8 G/DL (ref 32–36)
MCV RBC AUTO: 98 FL (ref 80–100)
MICROALBUMIN UR-MCNC: 1.2 MG/DL
NONHDLC SERPL-MCNC: 88 MG/DL (CALC)
PLATELET # BLD AUTO: 152 THOUSAND/UL (ref 140–400)
PMV BLD REES-ECKER: 11.4 FL (ref 7.5–12.5)
POTASSIUM SERPL-SCNC: 4.5 MMOL/L (ref 3.5–5.3)
PSA SERPL-MCNC: 2.46 NG/ML
RBC # BLD AUTO: 4.56 MILLION/UL (ref 4.2–5.8)
SODIUM SERPL-SCNC: 142 MMOL/L (ref 135–146)
TRIGL SERPL-MCNC: 65 MG/DL
TSH SERPL-ACNC: 2.37 MIU/L (ref 0.4–4.5)
VIT B12 SERPL-MCNC: 717 PG/ML (ref 200–1100)
WBC # BLD AUTO: 4.2 THOUSAND/UL (ref 3.8–10.8)

## 2025-03-19 DIAGNOSIS — I10 PRIMARY HYPERTENSION: ICD-10-CM

## 2025-03-19 DIAGNOSIS — I10 BENIGN ESSENTIAL HYPERTENSION: ICD-10-CM

## 2025-03-19 DIAGNOSIS — E11.9 TYPE 2 DIABETES MELLITUS WITHOUT COMPLICATION, UNSPECIFIED WHETHER LONG TERM INSULIN USE (MULTI): ICD-10-CM

## 2025-03-19 DIAGNOSIS — E78.5 HYPERLIPIDEMIA, UNSPECIFIED HYPERLIPIDEMIA TYPE: ICD-10-CM

## 2025-03-19 DIAGNOSIS — K21.9 GASTROESOPHAGEAL REFLUX DISEASE WITHOUT ESOPHAGITIS: ICD-10-CM

## 2025-03-19 DIAGNOSIS — E03.9 HYPOTHYROIDISM, UNSPECIFIED TYPE: ICD-10-CM

## 2025-03-19 RX ORDER — METFORMIN HYDROCHLORIDE 500 MG/1
500 TABLET ORAL
Qty: 180 TABLET | Refills: 1 | Status: SHIPPED | OUTPATIENT
Start: 2025-03-19 | End: 2025-09-15

## 2025-03-19 RX ORDER — PRAVASTATIN SODIUM 40 MG/1
40 TABLET ORAL
Qty: 90 TABLET | Refills: 1 | Status: SHIPPED | OUTPATIENT
Start: 2025-03-19 | End: 2025-09-15

## 2025-03-19 RX ORDER — AMLODIPINE BESYLATE 5 MG/1
5 TABLET ORAL DAILY
Qty: 90 TABLET | Refills: 1 | Status: SHIPPED | OUTPATIENT
Start: 2025-03-19

## 2025-03-19 RX ORDER — LOSARTAN POTASSIUM 100 MG/1
100 TABLET ORAL DAILY
Qty: 90 TABLET | Refills: 1 | Status: SHIPPED | OUTPATIENT
Start: 2025-03-19 | End: 2025-09-15

## 2025-03-19 RX ORDER — LEVOTHYROXINE SODIUM 150 UG/1
150 TABLET ORAL DAILY
Qty: 90 TABLET | Refills: 1 | Status: SHIPPED | OUTPATIENT
Start: 2025-03-19 | End: 2026-03-19

## 2025-03-19 RX ORDER — OMEPRAZOLE 40 MG/1
40 CAPSULE, DELAYED RELEASE ORAL DAILY
Qty: 90 CAPSULE | Refills: 1 | Status: SHIPPED | OUTPATIENT
Start: 2025-03-19 | End: 2025-09-15

## 2025-03-19 NOTE — TELEPHONE ENCOUNTER
Rx Refill Request Telephone Encounter  losartan (Cozaar) 100 mg tablet  pravastatin (Pravachol) 40 mg tablet   amLODIPine (Norvasc) 5 mg tablet   metFORMIN (Glucophage) 500 mg tablet  3  levothyroxine (Synthroid, Levoxyl) 150 mcg tablet    omeprazole (PriLOSEC) 40 mg DR capsule     Pharmacy:   [facility]    NOV:  3/28/25

## 2025-03-28 ENCOUNTER — APPOINTMENT (OUTPATIENT)
Dept: PRIMARY CARE | Facility: CLINIC | Age: 70
End: 2025-03-28
Payer: MEDICARE

## 2025-03-28 VITALS
TEMPERATURE: 97.5 F | DIASTOLIC BLOOD PRESSURE: 84 MMHG | SYSTOLIC BLOOD PRESSURE: 136 MMHG | BODY MASS INDEX: 28.92 KG/M2 | WEIGHT: 202 LBS

## 2025-03-28 DIAGNOSIS — R39.198 SLOW URINARY STREAM: ICD-10-CM

## 2025-03-28 DIAGNOSIS — M54.31 SCIATICA OF RIGHT SIDE: ICD-10-CM

## 2025-03-28 DIAGNOSIS — E11.9 TYPE 2 DIABETES MELLITUS WITHOUT COMPLICATION, UNSPECIFIED WHETHER LONG TERM INSULIN USE: ICD-10-CM

## 2025-03-28 DIAGNOSIS — R35.0 INCREASED URINARY FREQUENCY: ICD-10-CM

## 2025-03-28 DIAGNOSIS — I10 BENIGN ESSENTIAL HYPERTENSION: ICD-10-CM

## 2025-03-28 DIAGNOSIS — M79.605 PAIN OF LEFT LOWER EXTREMITY: Primary | ICD-10-CM

## 2025-03-28 PROCEDURE — 3075F SYST BP GE 130 - 139MM HG: CPT | Performed by: INTERNAL MEDICINE

## 2025-03-28 PROCEDURE — 1036F TOBACCO NON-USER: CPT | Performed by: INTERNAL MEDICINE

## 2025-03-28 PROCEDURE — 1160F RVW MEDS BY RX/DR IN RCRD: CPT | Performed by: INTERNAL MEDICINE

## 2025-03-28 PROCEDURE — 1123F ACP DISCUSS/DSCN MKR DOCD: CPT | Performed by: INTERNAL MEDICINE

## 2025-03-28 PROCEDURE — 3079F DIAST BP 80-89 MM HG: CPT | Performed by: INTERNAL MEDICINE

## 2025-03-28 PROCEDURE — 99214 OFFICE O/P EST MOD 30 MIN: CPT | Performed by: INTERNAL MEDICINE

## 2025-03-28 PROCEDURE — 4010F ACE/ARB THERAPY RXD/TAKEN: CPT | Performed by: INTERNAL MEDICINE

## 2025-03-28 PROCEDURE — 1159F MED LIST DOCD IN RCRD: CPT | Performed by: INTERNAL MEDICINE

## 2025-03-28 RX ORDER — METOPROLOL SUCCINATE 50 MG/1
50 TABLET, EXTENDED RELEASE ORAL DAILY
Qty: 90 TABLET | Refills: 3 | Status: SHIPPED | OUTPATIENT
Start: 2025-03-28 | End: 2026-03-28

## 2025-03-28 RX ORDER — METFORMIN HYDROCHLORIDE 1000 MG/1
1000 TABLET ORAL
Qty: 100 TABLET | Refills: 3 | Status: SHIPPED | OUTPATIENT
Start: 2025-03-28 | End: 2026-05-02

## 2025-03-28 ASSESSMENT — PATIENT HEALTH QUESTIONNAIRE - PHQ9
2. FEELING DOWN, DEPRESSED OR HOPELESS: NOT AT ALL
1. LITTLE INTEREST OR PLEASURE IN DOING THINGS: NOT AT ALL
SUM OF ALL RESPONSES TO PHQ9 QUESTIONS 1 AND 2: 0

## 2025-03-28 NOTE — PROGRESS NOTES
Subjective   Reason for Visit: Raphael Crain is an 69 y.o. male here for a f/u      Past Medical, Surgical, and Family History reviewed and updated in chart.    Reviewed all medications by prescribing practitioner or clinical pharmacist (such as prescriptions, OTCs, herbal therapies and supplements) and documented in the medical record.    HPI    Overall well   #1 LBP- working w/ pain med.   #2 IRBBB   #3 rt chest wall pain- resolved.    #4 hypertension-     #5 diabetes- +exercise--> 4x/week.  Diet fair   #6 mild leukopenia/thrombocytopenia/anemia-   #7 elevated AST-    #8 thyroid  #9 lipids  #10 CAD- by coronary artery calcium scan.   No CP/SOB  #11 chronic prostatitis- follows w/ urology q year.   #12 elevated creatine-normal.  Continue to follow.    #13 ongoing insomnia-    #14 return of rt rib pain- resolved  #15 gerd- s/p EGD,    #16 foot pain- good, f/u ortho.  #17 macrocytosis- better. follow  #18 b12- on b 12 1000 mcg po qd.    #19 dyspepsia/chronic gastritis/IBS- good, stable. f/u GI  Patient Care Team:  Keely Bradley MD as PCP - General  Keely Bradley MD as PCP - [employer] PCP     Review of Systems    Objective   Vitals:  /84   Temp 36.4 °C (97.5 °F)   Wt 91.6 kg (202 lb)   BMI 28.92 kg/m²       Physical Exam  Constitutional:       General: He is not in acute distress.     Appearance: Normal appearance. He is not ill-appearing.   HENT:      Head: Normocephalic and atraumatic.      Right Ear: Tympanic membrane and ear canal normal.      Left Ear: Tympanic membrane and ear canal normal.      Nose: Nose normal.      Mouth/Throat:      Mouth: Mucous membranes are moist.      Pharynx: Oropharynx is clear.   Eyes:      General: No scleral icterus.     Extraocular Movements: Extraocular movements intact.      Conjunctiva/sclera: Conjunctivae normal.      Pupils: Pupils are equal, round, and reactive to light.   Cardiovascular:      Rate and Rhythm: Normal rate and regular rhythm.       Pulses: Normal pulses.      Heart sounds: No murmur heard.     No friction rub.   Pulmonary:      Effort: Pulmonary effort is normal.      Breath sounds: Normal breath sounds. No rhonchi or rales.   Abdominal:      General: Abdomen is flat. Bowel sounds are normal. There is no distension.      Palpations: Abdomen is soft. There is no mass.      Tenderness: There is no abdominal tenderness.   Musculoskeletal:      Cervical back: Normal range of motion and neck supple.      Right lower leg: No edema.      Left lower leg: No edema.   Skin:     General: Skin is warm.   Neurological:      General: No focal deficit present.      Mental Status: He is alert and oriented to person, place, and time.      Cranial Nerves: No cranial nerve deficit.   Psychiatric:         Mood and Affect: Mood normal.         Behavior: Behavior normal.         Judgment: Judgment normal.         Lab Results   Component Value Date    WBC 4.2 03/14/2025    HGB 14.2 03/14/2025    HCT 44.7 03/14/2025     03/14/2025    CHOL 155 03/14/2025    TRIG 65 03/14/2025    HDL 67 03/14/2025    ALT 27 03/14/2025    AST 22 10/10/2024     03/14/2025    K 4.5 03/14/2025     03/14/2025    CREATININE 1.06 03/14/2025    BUN 18 03/14/2025    CO2 26 03/14/2025    TSH 2.37 03/14/2025    PSA 2.46 03/14/2025    HGBA1C 7.8 (H) 03/14/2025       Assessment/Plan   Problem List Items Addressed This Visit             ICD-10-CM    Benign essential hypertension I10     #1 LBP-  L5 radiculopathy- f/u  pain med  #2 IRBBB-stress test normal 2013. clinically stable  #3 rt chest wall pain- resolved. Follow-up when necessary  #4 hypertension-  off amlodipine x 1 mth--> bps 140s/80s.  Resume rx, f/u  3 weeks.  #5 diabetes- excellent last test.  Labs now. . reviewed at length.  pending appt eye doctor. Continue excellent diet and exercise program.  Follow-up 4 months  #6 mild leukopenia/thrombocytopenia/anemia-stable retest.  Reviewed again.  At this point, patient  agrees to see hematologist consult entered  #7 elevated AST- normal on retest. Continue to follow  #8 thyroid-recheck    #9 lipids- check labs   #10 CAD- by coronary artery calcium scan.  Con't asa/statin  #11 chronic prostatitis- follows w/ urology q year.   #12 elevated creatine-normal.  Continue to follow.    #13 ongoing insomnia- reviewed   #14 return of rt rib pain- resolved  #15 gerd- s/p EGD, will try to reduce to qod PPI.   #16 foot pain- good, f/u ortho.  #17 macrocytosis- better. follow  #18 b12- on b12 1000 mcg po qd. apparent pernicious anemia, continue. Recheck   #19 dyspepsia/chronic gastritis/IBS- good, stable. f/u GI  #20 rt knee pain-reviewed.  Seemingly medial soft tissue issue.  No significant arthritis on x-ray.  Painful to palpation.  f/u   orthopedist.  f/u  w/ pain MD--> i suspect back is major is      full code  s/p shingrix 2/2  colo 2015--> 2025  Covid/rsv/flu vaccine fall

## 2025-04-07 ENCOUNTER — PATIENT MESSAGE (OUTPATIENT)
Dept: ORTHOPEDIC SURGERY | Facility: CLINIC | Age: 70
End: 2025-04-07
Payer: MEDICARE

## 2025-04-14 ENCOUNTER — APPOINTMENT (OUTPATIENT)
Dept: ORTHOPEDIC SURGERY | Facility: CLINIC | Age: 70
End: 2025-04-14
Payer: MEDICARE

## 2025-04-14 ENCOUNTER — TELEPHONE (OUTPATIENT)
Dept: ORTHOPEDIC SURGERY | Facility: CLINIC | Age: 70
End: 2025-04-14

## 2025-04-14 VITALS — BODY MASS INDEX: 27.49 KG/M2 | HEIGHT: 70 IN | WEIGHT: 192 LBS

## 2025-04-14 DIAGNOSIS — M94.261 CHONDROMALACIA, RIGHT KNEE: ICD-10-CM

## 2025-04-14 DIAGNOSIS — M65.961 SYNOVITIS OF RIGHT KNEE: ICD-10-CM

## 2025-04-14 DIAGNOSIS — S83.241A ACUTE MEDIAL MENISCUS TEAR, RIGHT, INITIAL ENCOUNTER: Primary | ICD-10-CM

## 2025-04-14 PROCEDURE — 1123F ACP DISCUSS/DSCN MKR DOCD: CPT | Performed by: STUDENT IN AN ORGANIZED HEALTH CARE EDUCATION/TRAINING PROGRAM

## 2025-04-14 PROCEDURE — 1159F MED LIST DOCD IN RCRD: CPT | Performed by: STUDENT IN AN ORGANIZED HEALTH CARE EDUCATION/TRAINING PROGRAM

## 2025-04-14 PROCEDURE — 4010F ACE/ARB THERAPY RXD/TAKEN: CPT | Performed by: STUDENT IN AN ORGANIZED HEALTH CARE EDUCATION/TRAINING PROGRAM

## 2025-04-14 PROCEDURE — 1036F TOBACCO NON-USER: CPT | Performed by: STUDENT IN AN ORGANIZED HEALTH CARE EDUCATION/TRAINING PROGRAM

## 2025-04-14 PROCEDURE — 3008F BODY MASS INDEX DOCD: CPT | Performed by: STUDENT IN AN ORGANIZED HEALTH CARE EDUCATION/TRAINING PROGRAM

## 2025-04-14 PROCEDURE — 1160F RVW MEDS BY RX/DR IN RCRD: CPT | Performed by: STUDENT IN AN ORGANIZED HEALTH CARE EDUCATION/TRAINING PROGRAM

## 2025-04-14 PROCEDURE — 99214 OFFICE O/P EST MOD 30 MIN: CPT | Performed by: STUDENT IN AN ORGANIZED HEALTH CARE EDUCATION/TRAINING PROGRAM

## 2025-04-14 ASSESSMENT — PAIN - FUNCTIONAL ASSESSMENT: PAIN_FUNCTIONAL_ASSESSMENT: 0-10

## 2025-04-14 ASSESSMENT — PAIN SCALES - GENERAL: PAINLEVEL_OUTOF10: 5 - MODERATE PAIN

## 2025-04-14 NOTE — PROGRESS NOTES
PRIMARY CARE PHYSICIAN: Keely Bradley MD    ORTHOPAEDIC POSTOPERATIVE VISIT    ASSESSMENT & PLAN    Impression: 69 y.o. male 6 months postop s/p Right knee arthroscopy, partial medial meniscectomy, intra-articular debridement and synovectomy, chondroplasty done 10/18/2024 with concern for new displaced medial meniscus tear.    Plan:   He feels like at some point he may have retwisted the knee which has resulted in a similar pain and dysfunction to what he was experiencing before his surgery.  There is concern for a new displaced meniscus tear.  We discussed his treatment options going forward.  We discussed possibility of injection with continued PT versus another arthroscopic procedure to address a potential new displaced meniscus tear versus loose cartilage.  He notes that he cannot handle his knee in its current state.  After long discussion with the patient, he notes he would like to proceed with surgical intervention of form of a revision right knee arthroscopy, partial meniscectomy, intra-articular debridement and synovectomy, chondroplasty.  In the meantime he will undergo a repeat MRI of the right knee to evaluate the status of his menisci in preparation for surgical intervention.  I thoroughly explained the risks and benefits as well as the expected postoperative timeline for the proposed procedure versus nonoperative management. Risks of this procedure include but are not limited to bleeding, infection, nerve injury, DVT/PE and failure of repair or implant.  The patient expressed understanding and wished to proceed with surgical intervention.  All questions were answered. We will begin the presurgical process and find them a surgical date in a timely fashion that works for them.    At the end of the visit, all questions were answered in full. The patient is in agreement with the plan and recommendations. They will call the office with any questions/concerns.    Note dictated with Dragon Medical  transcription software. Completed without full typed error editing and sent to avoid delay.    SUBJECTIVE  CHIEF COMPLAINT:   Chief Complaint   Patient presents with    Right Knee - Post-op        HPI: Raphael Crain is a 69 y.o. patient who is now 6 months postop s/p Right knee arthroscopy, partial medial meniscectomy, intra-articular debridement and synovectomy, chondroplasty done 10/18/2024. They have been struggling with their rehab. The pain is similar to as it was before Sx and in the same place. The have stiffness in the knee as well. Pain is localized medial to the knee. Denies numbness/tingling. They have completed physical therapy. They have been starting to exercise own their own, but it has been bothersome.  His main complaint is swelling and mechanical symptoms including catching clicking and locking in the knee similar to what he was having before the surgery.  They are ambulating with no assistance. Mobic did provided mild improvement. No new injuries that he recalls although he may have had a twisting injury somewhere along the rehabilitation process.    11/25/2024 HPI Visit Info:  Pain is controlled with the current treatment plan. He still has pain on the medial side of the knee. He is still in physical therapy at [facility].     10/28/2024 HPI Visit Info:   He is overall doing well. He is ambulating with a cane. He is currently in physical therapy at [facility]. He states the pain he had prior to surgery has resolved but still has some soreness localized to the medial aspect of the right knee. He has no concerns.    REVIEW OF SYSTEMS  Constitutional: See HPI for pain assessment, No significant weight loss, recent trauma  Cardiovascular: No chest pain, shortness of breath  Respiratory: No difficulty breathing, cough  Gastrointestinal: No nausea, vomiting, diarrhea, constipation  Musculoskeletal: Noted in HPI, positive for pain, restricted motion, stiffness  Integumentary: No rashes, easy  "bruising, redness   Neurological: no numbness or tingling in extremities, no gait disturbances   Psychiatric: No mood changes, memory changes, social issues  Heme/Lymph: no excessive swelling, easy bruising, excessive bleeding  ENT: No hearing changes  Eyes: No vision changes    CURRENT MEDICATIONS:   Current Outpatient Medications   Medication Sig Dispense Refill    amLODIPine (Norvasc) 5 mg tablet Take 1 tablet (5 mg) by mouth once daily. 90 tablet 1    aspirin ([place]) 81 mg capsule Take 81 mg by mouth once daily.      cyanocobalamin (Vitamin B-12) 1,000 mcg tablet Take 1 tablet (1,000 mcg) by mouth once daily.      levothyroxine (Synthroid, Levoxyl) 150 mcg tablet Take 1 tablet (150 mcg) by mouth once daily. 90 tablet 1    losartan (Cozaar) 100 mg tablet Take 1 tablet (100 mg) by mouth once daily. 90 tablet 1    metFORMIN (Glucophage) 1,000 mg tablet Take 1 tablet (1,000 mg) by mouth once daily with breakfast. 100 tablet 3    metoprolol succinate XL (Toprol-XL) 50 mg 24 hr tablet Take 1 tablet (50 mg) by mouth once daily. 90 tablet 3    omeprazole (PriLOSEC) 40 mg DR capsule Take 1 capsule (40 mg) by mouth once daily. 90 capsule 1    pravastatin (Pravachol) 40 mg tablet Take 1 tablet (40 mg) by mouth once daily. 90 tablet 1    gabapentin (Neurontin) 300 mg capsule Take 1 capsule (300 mg) by mouth 3 times a day. (Patient not taking: Reported on 4/14/2025) 90 capsule 3     No current facility-administered medications for this visit.        OBJECTIVE    PHYSICAL EXAM      10/18/2024    11:10 AM 10/18/2024    11:12 AM 10/18/2024    11:38 AM 10/28/2024     9:46 AM 11/25/2024     9:41 AM 3/28/2025     8:58 AM 4/14/2025     2:05 PM   Vitals   Systolic 150 146 162   136    Diastolic 87 80 92   84    Heart Rate 68 75 66       Temp 36.4 °C (97.5 °F) 36.5 °C (97.7 °F) 36 °C (96.8 °F)   36.4 °C (97.5 °F)    Resp 14 18 18       Height    1.78 m (5' 10.08\") 1.78 m (5' 10.08\")  1.778 m (5' 10\")   Weight (lb)    198.41 198.41 " 202 192   BMI    28.41 kg/m2 28.41 kg/m2 28.92 kg/m2 27.55 kg/m2   BSA (m2)    2.11 m2 2.11 m2 2.13 m2 2.07 m2   Visit Report    Report Report Report Report      Body mass index is 27.55 kg/m².    General: Well-appearing, no acute distress    Skin intact bilateral upper and lower extremities  No erythema  No warmth    Detailed examination of right knee demonstrates:  Surgical incisions well-healed  No erythema or warmth  No ecchymosis  Medium effusion  Knee range of motion: 0-0-125  Mild residual quadriceps inhibition and trace atrophy  Positive Graciela's  Positive medial joint line tenderness  Patella mobility 1+ medial and lateral  Lower extremity motor grossly intact  L4-S1 sensation intact bilaterally  2+ DP/PT pulses bilaterally  Warm and well-perfused, brisk capillary refill    IMAGING:   No new imaging

## 2025-04-21 DIAGNOSIS — S83.241A ACUTE MEDIAL MENISCUS TEAR OF RIGHT KNEE, INITIAL ENCOUNTER: ICD-10-CM

## 2025-04-22 ENCOUNTER — OFFICE VISIT (OUTPATIENT)
Dept: NEUROSURGERY | Facility: CLINIC | Age: 70
End: 2025-04-22
Payer: MEDICARE

## 2025-04-22 ENCOUNTER — HOSPITAL ENCOUNTER (OUTPATIENT)
Dept: RADIOLOGY | Facility: CLINIC | Age: 70
Discharge: HOME | End: 2025-04-22
Payer: MEDICARE

## 2025-04-22 VITALS
SYSTOLIC BLOOD PRESSURE: 147 MMHG | DIASTOLIC BLOOD PRESSURE: 83 MMHG | TEMPERATURE: 97.1 F | HEIGHT: 70 IN | BODY MASS INDEX: 27.49 KG/M2 | WEIGHT: 192 LBS | HEART RATE: 74 BPM

## 2025-04-22 DIAGNOSIS — M54.31 SCIATICA OF RIGHT SIDE: ICD-10-CM

## 2025-04-22 DIAGNOSIS — M79.605 PAIN OF LEFT LOWER EXTREMITY: ICD-10-CM

## 2025-04-22 DIAGNOSIS — S83.241A ACUTE MEDIAL MENISCUS TEAR OF RIGHT KNEE, INITIAL ENCOUNTER: ICD-10-CM

## 2025-04-22 PROCEDURE — 99213 OFFICE O/P EST LOW 20 MIN: CPT | Performed by: NEUROLOGICAL SURGERY

## 2025-04-22 PROCEDURE — 4010F ACE/ARB THERAPY RXD/TAKEN: CPT | Performed by: NEUROLOGICAL SURGERY

## 2025-04-22 PROCEDURE — 1036F TOBACCO NON-USER: CPT | Performed by: NEUROLOGICAL SURGERY

## 2025-04-22 PROCEDURE — 1123F ACP DISCUSS/DSCN MKR DOCD: CPT | Performed by: NEUROLOGICAL SURGERY

## 2025-04-22 PROCEDURE — 99203 OFFICE O/P NEW LOW 30 MIN: CPT | Performed by: NEUROLOGICAL SURGERY

## 2025-04-22 PROCEDURE — 3008F BODY MASS INDEX DOCD: CPT | Performed by: NEUROLOGICAL SURGERY

## 2025-04-22 PROCEDURE — 3079F DIAST BP 80-89 MM HG: CPT | Performed by: NEUROLOGICAL SURGERY

## 2025-04-22 PROCEDURE — 3077F SYST BP >= 140 MM HG: CPT | Performed by: NEUROLOGICAL SURGERY

## 2025-04-22 PROCEDURE — 1125F AMNT PAIN NOTED PAIN PRSNT: CPT | Performed by: NEUROLOGICAL SURGERY

## 2025-04-22 PROCEDURE — 73721 MRI JNT OF LWR EXTRE W/O DYE: CPT | Mod: RT

## 2025-04-22 PROCEDURE — 1159F MED LIST DOCD IN RCRD: CPT | Performed by: NEUROLOGICAL SURGERY

## 2025-04-22 ASSESSMENT — ENCOUNTER SYMPTOMS
OCCASIONAL FEELINGS OF UNSTEADINESS: 1
LOSS OF SENSATION IN FEET: 0

## 2025-04-22 ASSESSMENT — PAIN SCALES - GENERAL: PAINLEVEL_OUTOF10: 4

## 2025-04-22 NOTE — PROGRESS NOTES
It was a pleasure to see Mr. Crain at the [facility] at [facility].     He is a really nice 69 y.o. male  who presents to us with complaints LOW BACK PAIN radiating to RIGHT LEG that started about  2  years  ago, and have been unchanged since that time.  The symptoms started after no known injury    The pain is 8 /10. The pain is described as aching, burning, pinching, sharp, soreness, and throbbing and occurs intermittently.  Symptoms are exacerbated by lying down and walking for more than 10 minutes. Factors which relieve the pain include change in body position, stretching, and putting pillow under knee.       Numbness and/or tingling - YES right leg and foot    Weakness : YES right leg    Bladder/Bowel symptoms - NO    The patient has tried medications (eg: gabapentin, NSAIDS and narcotics ) : No  PT : No  Pain Management with ESIs/selective nerve blocks  - NO    he is a NON-SMOKER and DIABETIC    History of Depression : NO    PRIOR SPINE SURGERY: NO    use of Aspirin, Coumadin, or Plavix or any other anticoagulants Baby ASA 81 mg    NARCOTICS for pain : NO    Part of this patient’s history is from personal review of the patient’s previous charts.      Medical History[1]      Current Medications[2]      Review of Systems :   Constitutional: No fever or chills  Respiratory: No shortness of breath or wheezing  Musculoskeletal: see HPI above   Neurologic: See HPI above    EXAM:   There were no vitals filed for this visit.  NEURO: Neurologically patient is awake alert and oriented X 3    No obvious cranial nerve deficit.  Strength is almost 5/5 throughout all motor groups tested with no asymmetric significant motor deficit.  Deep tendon reflexes are symmetric throughout.   Gait : WNL   Sensory examination is intact to touch and painful stimuli.      IMAGING:   MRI of the lumbar spine that was done on 5/17/2022 was reviewed personally and shows presence of mild degenerative changes involving  the lumbar spine most significantly being presence of left L4-5 lateral recess stenosis with flattening of the thecal sac centrally with AP dimension of the central canal being 7 mm at that level.  There is evidence of a peripheral synovial cyst at L4-5 level which I do not see resulting in any kind of nerve root compression    ASSESSMENT AND PLAN:  Mr. Crain has not tried any conservative treatment yet.   His neurological examination is completely benign with absence of any focal neurological deficits.  There is no urgent indication to obtain any advanced imaging . His previous MRI from 2022 does not shows any significant stenosis.  In fact the narrowing that he has is on the left side and all his symptoms are on the right side that does not correlate with the imaging findings..I have given him a referral for PT and PAIN management.   I will be happy to see him back if he continues to be symptomatic despite all conservative treatment to obtain advanced imaging given his previous MRI is more than 2 years old.    It was a pleasure to participate in Mr. Crain clinical care. All questions were answered to him satisfaction and he explained understanding of the further treatment plan.     Arnie Weaver MD, [facility]   of Neurological Surgery  [facility] [employer]  Attending Surgeon  Director - Minimally Invasive Spine Surgery  [place], [place]      ---Some of this note was completed using Dragon voice recognition technology and sometimes the software misinterprets words. This may include unintended errors with respect to translation of words, typographical errors or grammar errors which may not have been identified prior to finalization of the chart note. Please take this into account when reading this note---           [1]   Past Medical History:  Diagnosis Date    Cholesterolosis of gallbladder     Gallbladder polyp    Decreased white  blood cell count, unspecified     Leukopenia    Functional dyspepsia 02/17/2020    Functional dyspepsia    Other specified symptoms and signs involving the circulatory and respiratory systems 01/24/2020    Globus sensation    Personal history of other diseases of the circulatory system     Personal history of coronary atherosclerosis    Personal history of other diseases of the digestive system 01/28/2019    History of gastroesophageal reflux (GERD)    Personal history of other specified conditions 06/12/2014    History of fever    Personal history of other specified conditions 03/09/2016    History of fever    Prediabetes     Pre-diabetes   [2]   Current Outpatient Medications:     amLODIPine (Norvasc) 5 mg tablet, Take 1 tablet (5 mg) by mouth once daily., Disp: 90 tablet, Rfl: 1    aspirin (Vazalore) 81 mg capsule, Take 81 mg by mouth once daily., Disp: , Rfl:     cyanocobalamin (Vitamin B-12) 1,000 mcg tablet, Take 1 tablet (1,000 mcg) by mouth once daily., Disp: , Rfl:     levothyroxine (Synthroid, Levoxyl) 150 mcg tablet, Take 1 tablet (150 mcg) by mouth once daily., Disp: 90 tablet, Rfl: 1    losartan (Cozaar) 100 mg tablet, Take 1 tablet (100 mg) by mouth once daily., Disp: 90 tablet, Rfl: 1    metFORMIN (Glucophage) 1,000 mg tablet, Take 1 tablet (1,000 mg) by mouth once daily with breakfast., Disp: 100 tablet, Rfl: 3    metoprolol succinate XL (Toprol-XL) 50 mg 24 hr tablet, Take 1 tablet (50 mg) by mouth once daily., Disp: 90 tablet, Rfl: 3    omeprazole (PriLOSEC) 40 mg DR capsule, Take 1 capsule (40 mg) by mouth once daily., Disp: 90 capsule, Rfl: 1    pravastatin (Pravachol) 40 mg tablet, Take 1 tablet (40 mg) by mouth once daily., Disp: 90 tablet, Rfl: 1    gabapentin (Neurontin) 300 mg capsule, Take 1 capsule (300 mg) by mouth 3 times a day. (Patient not taking: Reported on 4/14/2025), Disp: 90 capsule, Rfl: 3

## 2025-04-23 ENCOUNTER — CLINICAL SUPPORT (OUTPATIENT)
Dept: PREADMISSION TESTING | Facility: HOSPITAL | Age: 70
End: 2025-04-23
Payer: MEDICARE

## 2025-04-23 ASSESSMENT — ENCOUNTER SYMPTOMS
COUGH: 0
PALPITATIONS: 0
CHILLS: 0
MYALGIAS: 0
ABDOMINAL PAIN: 0
WEAKNESS: 0
WOUND: 0
RHINORRHEA: 0
DYSPNEA AT REST: 0
FEVER: 0
SHORTNESS OF BREATH: 0

## 2025-04-23 NOTE — PERIOPERATIVE NURSING NOTE
Spoke with patient and completed RN PAT screening call. Discussed PAT education. Chart updated per information provided by patient. Patient had no further questions at this time. In person PAT appointment is scheduled for patient.  Patient aware of appointment

## 2025-04-23 NOTE — CPM/PAT H&P
[facility]/PAT Evaluation       Name: Raphael Crain (Raphael Crain)  /Age: 1955/69 y.o.     {[facility] Visit Type:20040}      Chief Complaint: ***    HPI    Medical History[1]    Surgical History[2]    Patient  has no history on file for sexual activity.    Family History[3]    Allergies[4]    Prior to Admission medications    Medication Sig Start Date End Date Taking? Authorizing Provider   amLODIPine (Norvasc) 5 mg tablet Take 1 tablet (5 mg) by mouth once daily. 3/19/25  Yes Keely Bradley MD   aspirin (Vazalore) 81 mg capsule Take 81 mg by mouth once daily.   Yes Historical Provider, MD   cyanocobalamin (Vitamin B-12) 1,000 mcg tablet Take 1 tablet (1,000 mcg) by mouth once daily.   Yes Historical Provider, MD   gabapentin (Neurontin) 300 mg capsule Take 1 capsule (300 mg) by mouth 3 times a day.  Patient not taking: Reported on 2025 3/4/24 3/28/25  Boris Michel MD   levothyroxine (Synthroid, Levoxyl) 150 mcg tablet Take 1 tablet (150 mcg) by mouth once daily. 3/19/25 3/19/26 Yes Keely Bradley MD   losartan (Cozaar) 100 mg tablet Take 1 tablet (100 mg) by mouth once daily. 3/19/25 9/15/25 Yes Keely Bradley MD   metFORMIN (Glucophage) 1,000 mg tablet Take 1 tablet (1,000 mg) by mouth once daily with breakfast. 3/28/25 5/2/26 Yes Keely Bradley MD   metoprolol succinate XL (Toprol-XL) 50 mg 24 hr tablet Take 1 tablet (50 mg) by mouth once daily. 3/28/25 3/28/26 Yes Keely Bradley MD   omeprazole (PriLOSEC) 40 mg DR capsule Take 1 capsule (40 mg) by mouth once daily. 3/19/25 9/15/25 Yes Keely Bradley MD   pravastatin (Pravachol) 40 mg tablet Take 1 tablet (40 mg) by mouth once daily. 3/19/25 9/15/25 Yes Keely Bradley MD        [facility] ROS:   Constitutional:    no fever   no chills  Neuro/Psych:    no weakness  Eyes:   Ears:    no hearing loss   no hearing aides  Nose:    no nasal discharge  Mouth:    no dental issues  Throat:    no throat pain  Neck:   Cardio:    no chest pain   no palpitations   no  dyspnea  Respiratory:    no cough   no shortness of breath  Endocrine:   GI:    no abdominal pain  :   Musculoskeletal:    no myalgias  Hematologic:    no history of blood transfusion  Skin:   no sores/wound   no rash      PAT Physical Exam     Airway    Testing/Diagnostic:     Patient Specialist/PCP:     Visit Vitals  Smoking Status Former       DASI Risk Score      Flowsheet Row Pre-Admission Testing from 10/10/2024 in [facility]   Can you take care of yourself (eat, dress, bathe, or use toilet)?  2.75 filed at 10/10/2024 1547   Can you walk indoors, such as around your house? 1.75 filed at 10/10/2024 1547   Can you walk a block or two on level ground?  2.75 filed at 10/10/2024 1547   Can you climb a flight of stairs or walk up a hill? 5.5 filed at 10/10/2024 1547   Can you run a short distance? 0 filed at 10/10/2024 1547   Can you do light work around the house like dusting or washing dishes? 2.7 filed at 10/10/2024 1547   Can you do moderate work around the house like vacuuming, sweeping floors or carrying groceries? 3.5 filed at 10/10/2024 1547   Can you do heavy work around the house like scrubbing floors or lifting and moving heavy furniture?  8 filed at 10/10/2024 1547   Can you do yard work like raking leaves, weeding or pushing a mower? 4.5 filed at 10/10/2024 1547   Can you have sexual relations? 5.25 filed at 10/10/2024 1547   Can you participate in moderate recreational activities like golf, bowling, dancing, doubles tennis or throwing a baseball or football? 6 filed at 10/10/2024 1547   Can you participate in strenous sports like swimming, singles tennis, football, basketball, or skiing? 0 filed at 10/10/2024 1547   DASI SCORE 42.7 filed at 10/10/2024 1547   METS Score (Will be calculated only when all the questions are answered) 8 filed at 10/10/2024 1547          Caprini DVT Assessment    No data to display       Modified Frailty Index    No data to display       [id number]  Stroke Risk Points  Current as of just now        N/A 0 to 9 Points:      Last Change: N/A          The [id number] risk score (Lip JACQUELINE, et al. 2009. © 2010 [employer]) quantifies the risk of stroke for a patient with atrial fibrillation. For patients without atrial fibrillation or under the age of 18 this score appears as N/A. Higher score values generally indicate higher risk of stroke.        This score is not applicable to this patient. Components are not calculated.          Revised Cardiac Risk Index    No data to display       Apfel Simplified Score    No data to display       Risk Analysis Index Results This Encounter    No data found in the last 10 encounters.       Stop Bang Score      Flowsheet Row Clinical Support from 4/23/2025 in [facility] Pre-Admission Testing from 10/10/2024 in [facility]   Do you snore loudly? 1 filed at 04/23/2025 0949 0 filed at 10/10/2024 1524   Do you often feel tired or fatigued after your sleep? 0 filed at 04/23/2025 0949 1 filed at 10/10/2024 1524   Has anyone ever observed you stop breathing in your sleep? 0 filed at 04/23/2025 0949 0 filed at 10/10/2024 1524   Do you have or are you being treated for high blood pressure? 1 filed at 04/23/2025 0949 1 filed at 10/10/2024 1524   Recent BMI (Calculated) 27.6 filed at 04/23/2025 0949 28.7 filed at 10/10/2024 1524   Is BMI greater than 35 kg/m2? 0=No filed at 04/23/2025 0949 0=No filed at 10/10/2024 1524   Age older than 50 years old? 1=Yes filed at 04/23/2025 0949 1=Yes filed at 10/10/2024 1524   Is your neck circumference greater than 17 inches (Male) or 16 inches (Female)? -- 0 filed at 10/10/2024 1524   Gender - Male 1=Yes filed at 04/23/2025 0949 1=Yes filed at 10/10/2024 1524   STOP-BANG Total Score -- 4 filed at 10/10/2024 1524          Prodigy: High Risk  Total Score: 20              Prodigy Age Score      Prodigy Gender Score          ARISCAT Score for  Postoperative Pulmonary Complications    No data to display       Blackwell Perioperative Risk for Myocardial Infarction or Cardiac Arrest (TEJA)    No data to display         Assessment and Plan:     {[facility] EMBEDDED ASSESSMENT AND PLAN:88399}             [1]   Past Medical History:  Diagnosis Date    Arthritis     Cataract     Cholesterolosis of gallbladder     Gallbladder polyp    Decreased white blood cell count, unspecified     Leukopenia    Functional dyspepsia 02/17/2020    Functional dyspepsia    GERD (gastroesophageal reflux disease)     Hyperlipidemia     Hypertension     Hypothyroidism     Other specified symptoms and signs involving the circulatory and respiratory systems 01/24/2020    Globus sensation    Personal history of other diseases of the circulatory system     Personal history of coronary atherosclerosis    Personal history of other diseases of the digestive system 01/28/2019    History of gastroesophageal reflux (GERD)    Personal history of other specified conditions 06/12/2014    History of fever    Personal history of other specified conditions 03/09/2016    History of fever    Prediabetes     Pre-diabetes    Type 2 diabetes mellitus    [2]   Past Surgical History:  Procedure Laterality Date    KNEE SURGERY Right     10/18/24 meniscus    OTHER SURGICAL HISTORY  02/20/2014    Cardiac Catheter His Ablation    OTHER SURGICAL HISTORY  01/24/2020    Tonsillectomy    OTHER SURGICAL HISTORY  01/24/2020    Parotidectomy    OTHER SURGICAL HISTORY  09/11/2020    History of prior surgery    OTHER SURGICAL HISTORY  09/02/2022    Endoscopy    OTHER SURGICAL HISTORY  09/02/2022    Colonoscopy   [3]   Family History  Problem Relation Name Age of Onset    Diabetes Mother      Heart failure Father     [4] No Known Allergies

## 2025-05-13 ENCOUNTER — PRE-ADMISSION TESTING (OUTPATIENT)
Dept: PREADMISSION TESTING | Facility: HOSPITAL | Age: 70
End: 2025-05-13
Payer: MEDICARE

## 2025-05-13 VITALS
SYSTOLIC BLOOD PRESSURE: 145 MMHG | RESPIRATION RATE: 16 BRPM | OXYGEN SATURATION: 99 % | WEIGHT: 195 LBS | DIASTOLIC BLOOD PRESSURE: 75 MMHG | BODY MASS INDEX: 27.92 KG/M2 | HEIGHT: 70 IN | HEART RATE: 60 BPM | TEMPERATURE: 97.5 F

## 2025-05-13 DIAGNOSIS — I10 BENIGN ESSENTIAL HYPERTENSION: Primary | ICD-10-CM

## 2025-05-13 PROCEDURE — 93005 ELECTROCARDIOGRAM TRACING: CPT

## 2025-05-13 PROCEDURE — 93010 ELECTROCARDIOGRAM REPORT: CPT | Performed by: INTERNAL MEDICINE

## 2025-05-13 PROCEDURE — 99204 OFFICE O/P NEW MOD 45 MIN: CPT | Performed by: NURSE PRACTITIONER

## 2025-05-13 RX ORDER — MELOXICAM 15 MG/1
15 TABLET ORAL DAILY
COMMUNITY

## 2025-05-13 ASSESSMENT — DUKE ACTIVITY SCORE INDEX (DASI)
CAN YOU DO HEAVY WORK AROUND THE HOUSE LIKE SCRUBBING FLOORS OR LIFTING AND MOVING HEAVY FURNITURE: NO
CAN YOU WALK A BLOCK OR TWO ON LEVEL GROUND: YES
DASI METS SCORE: 6.3
CAN YOU DO YARD WORK LIKE RAKING LEAVES, WEEDING OR PUSHING A MOWER: YES
CAN YOU CLIMB A FLIGHT OF STAIRS OR WALK UP A HILL: YES
CAN YOU WALK INDOORS, SUCH AS AROUND YOUR HOUSE: YES
CAN YOU DO LIGHT WORK AROUND THE HOUSE LIKE DUSTING OR WASHING DISHES: YES
CAN YOU DO MODERATE WORK AROUND THE HOUSE LIKE VACUUMING, SWEEPING FLOORS OR CARRYING GROCERIES: YES
CAN YOU PARTICIPATE IN MODERATE RECREATIONAL ACTIVITIES LIKE GOLF, BOWLING, DANCING, DOUBLES TENNIS OR THROWING A BASEBALL OR FOOTBALL: NO
CAN YOU HAVE SEXUAL RELATIONS: YES
CAN YOU RUN A SHORT DISTANCE: NO
CAN YOU PARTICIPATE IN STRENOUS SPORTS LIKE SWIMMING, SINGLES TENNIS, FOOTBALL, BASKETBALL, OR SKIING: NO
CAN YOU TAKE CARE OF YOURSELF (EAT, DRESS, BATHE, OR USE TOILET): YES
TOTAL_SCORE: 28.7

## 2025-05-13 ASSESSMENT — PAIN - FUNCTIONAL ASSESSMENT: PAIN_FUNCTIONAL_ASSESSMENT: 0-10

## 2025-05-13 ASSESSMENT — PAIN SCALES - GENERAL: PAINLEVEL_OUTOF10: 2

## 2025-05-13 NOTE — H&P (VIEW-ONLY)
[facility]/PAT Evaluation       Name: Raphael Crain (Raphael Crain)  /Age: 1955/69 y.o.     In-Person       Chief Complaint: Acute medial meniscus tear, right, Synovitis of right knee     HPI  Patient is an alert and oriented 69 year old male scheduled for a  Right knee arthroscopy, partial medial meniscectomy, intra-articular debridement and synovectomy, chondroplasty on 25 with Dr. Anderson for  Acute medial meniscus tear, right, Synovitis of right knee . PMHX includes HTN, hypothyroid, GERD, DM2, HLD, anemia. Presents to [facility] today for perioperative risk stratification and optimization.     Medical History[1]    Surgical History[2]    Patient  has no history on file for sexual activity.    Family History[3]    Allergies[4]    Prior to Admission medications    Medication Sig Start Date End Date Taking? Authorizing Provider   amLODIPine (Norvasc) 5 mg tablet Take 1 tablet (5 mg) by mouth once daily. 3/19/25  Yes Keely Bradley MD   aspirin (Vazalore) 81 mg capsule Take 81 mg by mouth once daily.   Yes Historical Provider, MD   cyanocobalamin (Vitamin B-12) 1,000 mcg tablet Take 1 tablet (1,000 mcg) by mouth once daily.   Yes Historical Provider, MD   levothyroxine (Synthroid, Levoxyl) 150 mcg tablet Take 1 tablet (150 mcg) by mouth once daily. 3/19/25 3/19/26 Yes Keely Bradley MD   losartan (Cozaar) 100 mg tablet Take 1 tablet (100 mg) by mouth once daily. 3/19/25 9/15/25 Yes Keely Bradley MD   meloxicam (Mobic) 15 mg tablet Take 1 tablet (15 mg) by mouth once daily.   Yes Historical Provider, MD   metFORMIN (Glucophage) 1,000 mg tablet Take 1 tablet (1,000 mg) by mouth once daily with breakfast. 3/28/25 5/2/26 Yes Keely Bradley MD   metoprolol succinate XL (Toprol-XL) 50 mg 24 hr tablet Take 1 tablet (50 mg) by mouth once daily. 3/28/25 3/28/26 Yes Keely Bradley MD   omeprazole (PriLOSEC) 40 mg DR capsule Take 1 capsule (40 mg) by mouth once daily. 3/19/25 9/15/25 Yes Keely Bradley MD  "  pravastatin (Pravachol) 40 mg tablet Take 1 tablet (40 mg) by mouth once daily. 3/19/25 9/15/25 Yes Keely Bradley MD   gabapentin (Neurontin) 300 mg capsule Take 1 capsule (300 mg) by mouth 3 times a day.  Patient not taking: Reported on 4/14/2025 3/4/24 3/28/25  Boris Michel MD        Visit Vitals  /75   Pulse 60   Temp 36.4 °C (97.5 °F)   Resp 16   Ht 1.778 m (5' 10\")   Wt 88.5 kg (195 lb)   SpO2 99%   BMI 27.98 kg/m²   Smoking Status Former   BSA 2.09 m²     Review of Systems   Constitutional: Negative for chills, decreased appetite, diaphoresis, fever and malaise/fatigue.   Eyes:  Negative for blurred vision and double vision.   Cardiovascular:  Negative for chest pain, claudication, cyanosis, dyspnea on exertion, irregular heartbeat, leg swelling, near-syncope and palpitations.   Respiratory:  Negative for cough, hemoptysis, shortness of breath and wheezing.    Endocrine: Negative for cold intolerance, heat intolerance, polydipsia, polyphagia and polyuria.   Gastrointestinal:  Negative for abdominal pain, constipation, diarrhea, dysphagia, nausea and vomiting.   Genitourinary:  Negative for bladder incontinence, dysuria, hematuria, incomplete emptying, nocturia, frequency, pelvic pain and urgency.   Neurological:  Negative for headaches, light-headedness, paresthesias, sensory change and weakness.   Psychiatric/Behavioral:  Negative for altered mental status.    Musculoskeletal: Positive for myalgias, arthralgias     Vitals and nursing note reviewed.     Physical exam  Constitutional:       Appearance: Normal appearance. He is Overweight.   HENT:      Head: Normocephalic and atraumatic.      Mouth/Throat:      Mouth: Mucous membranes are moist.      Pharynx: Oropharynx is clear.   Eyes:      Extraocular Movements: Extraocular movements intact.      Conjunctiva/sclera: Conjunctivae normal.      Pupils: Pupils are equal, round, and reactive to light.   Cardiovascular:      PMI at left midclavicular " line. Normal rate. Regular rhythm. Normal S1. Normal S2.       Murmurs: There is no murmur.      No gallop.  No click. No rub.       No audible carotid bruit     No lower extremity edema on exam  Pulmonary:      Effort: Pulmonary effort is normal.      Breath sounds: Normal breath sounds.   Abdominal:      General: Abdomen is flat. Bowel sounds are normal.      Palpations: Abdomen is soft and non-tender  Musculoskeletal:      Cervical back: Normal range of motion and neck supple.   Skin:     General: Skin is warm and dry.      Capillary Refill: Capillary refill takes less than 2 seconds.   Neurological:      General: No focal deficit present.      Mental Status: He is alert and oriented to person, place, and time. Mental status is at baseline.   Psychiatric:         Mood and Affect: Mood normal.         Behavior: Behavior normal.         Thought Content: Thought content normal.         Judgment: Judgment normal.     Vitals and nursing note reviewed. Physical exam within normal limits.     DASI Risk Score      Flowsheet Row Pre-Admission Testing from 5/13/2025 in [facility] Pre-Admission Testing from 10/10/2024 in [facility]   Can you take care of yourself (eat, dress, bathe, or use toilet)?  2.75 filed at 05/13/2025 1439 2.75 filed at 10/10/2024 1547   Can you walk indoors, such as around your house? 1.75 filed at 05/13/2025 1439 1.75 filed at 10/10/2024 1547   Can you walk a block or two on level ground?  2.75 filed at 05/13/2025 1439 2.75 filed at 10/10/2024 1547   Can you climb a flight of stairs or walk up a hill? 5.5 filed at 05/13/2025 1439 5.5 filed at 10/10/2024 1547   Can you run a short distance? 0 filed at 05/13/2025 1439 0 filed at 10/10/2024 1547   Can you do light work around the house like dusting or washing dishes? 2.7 filed at 05/13/2025 1439 2.7 filed at 10/10/2024 1547   Can you do moderate work around the house like vacuuming, sweeping floors or carrying  groceries? 3.5 filed at 05/13/2025 1439 3.5 filed at 10/10/2024 1547   Can you do heavy work around the house like scrubbing floors or lifting and moving heavy furniture?  0 filed at 05/13/2025 1439 8 filed at 10/10/2024 1547   Can you do yard work like raking leaves, weeding or pushing a mower? 4.5 filed at 05/13/2025 1439 4.5 filed at 10/10/2024 1547   Can you have sexual relations? 5.25 filed at 05/13/2025 1439 5.25 filed at 10/10/2024 1547   Can you participate in moderate recreational activities like golf, bowling, dancing, doubles tennis or throwing a baseball or football? 0 filed at 05/13/2025 1439 6 filed at 10/10/2024 1547   Can you participate in strenous sports like swimming, singles tennis, football, basketball, or skiing? 0 filed at 05/13/2025 1439 0 filed at 10/10/2024 1547   DASI SCORE 28.7 filed at 05/13/2025 1439 42.7 filed at 10/10/2024 1547   METS Score (Will be calculated only when all the questions are answered) 6.3 filed at 05/13/2025 1439 8 filed at 10/10/2024 1547          Caprini DVT Assessment      Flowsheet Row Pre-Admission Testing from 5/13/2025 in [facility]   DVT Score (IF A SCORE IS NOT CALCULATING, MUST SELECT A BMI TO COMPLETE) 5 filed at 05/13/2025 1438   Surgical Factors Major surgery planned, including arthroscopic and laproscopic (1-2 hours) filed at 05/13/2025 1438   BMI (BMI MUST BE CHOSEN) 30 or less filed at 05/13/2025 1438          Modified Frailty Index    No data to display       [id number] Stroke Risk Points  Current as of 2 minutes ago        N/A 0 to 9 Points:      Last Change: N/A          The [id number] risk score (Lip JACQUELINE, et al. 2009. © 2010 [employer]) quantifies the risk of stroke for a patient with atrial fibrillation. For patients without atrial fibrillation or under the age of 18 this score appears as N/A. Higher score values generally indicate higher risk of stroke.        This score is not applicable to this  patient. Components are not calculated.          Revised Cardiac Risk Index      Flowsheet Row Pre-Admission Testing from 5/13/2025 in [facility]   High-Risk Surgery (Intraperitoneal, Intrathoracic,Suprainguinal vascular) 0 filed at 05/13/2025 1439   History of ischemic heart disease (History of MI, History of positive exercuse test, Current chest paint considered due to myocardial ischemia, Use of nitrate therapy, ECG with pathological Q Waves) 0 filed at 05/13/2025 1439   History of congestive heart failure (pulmonary edemia, bilateral rales or S3 gallop, Paroxysmal nocturnal dyspnea, CXR showing pulmonary vascular redistribution) 0 filed at 05/13/2025 1439   History of cerebrovascular disease (Prior TIA or stroke) 0 filed at 05/13/2025 1439   Pre-operative insulin treatment 0 filed at 05/13/2025 1439   Pre-operative creatinine>2 mg/dl 0 filed at 05/13/2025 1439   Revised Cardiac Risk Calculator 0 filed at 05/13/2025 1439          Apfel Simplified Score    No data to display       Risk Analysis Index Results This Encounter    No data found in the last 10 encounters.       Stop Bang Score      Flowsheet Row Pre-Admission Testing from 5/13/2025 in [facility] Clinical Support from 4/23/2025 in [facility]   Do you snore loudly? 1 filed at 05/13/2025 1337 1 filed at 04/23/2025 0949   Do you often feel tired or fatigued after your sleep? 1 filed at 05/13/2025 1337 0 filed at 04/23/2025 0949   Has anyone ever observed you stop breathing in your sleep? 0 filed at 05/13/2025 1337 0 filed at 04/23/2025 0949   Do you have or are you being treated for high blood pressure? 1 filed at 05/13/2025 1337 1 filed at 04/23/2025 0949   Recent BMI (Calculated) 28 filed at 05/13/2025 1337 27.6 filed at 04/23/2025 0949   Is BMI greater than 35 kg/m2? 0=No filed at 05/13/2025 1337 0=No filed at 04/23/2025 0949   Age older than 50 years old? 1=Yes filed at 05/13/2025 2536 1=Yes filed at  04/23/2025 0949   Is your neck circumference greater than 17 inches (Male) or 16 inches (Female)? 0 filed at 05/13/2025 1337 --   Gender - Male 1=Yes filed at 05/13/2025 1337 1=Yes filed at 04/23/2025 0949   STOP-BANG Total Score 5 filed at 05/13/2025 1337 --          Prodigy: High Risk  Total Score: 20              Prodigy Age Score      Prodigy Gender Score          ARISCAT Score for Postoperative Pulmonary Complications    No data to display       Rishi Perioperative Risk for Myocardial Infarction or Cardiac Arrest (TEJA)      Flowsheet Row Pre-Admission Testing from 5/13/2025 in [facility]   Calculated Age Score 1.4 filed at 05/13/2025 1439   Functional Status  0 filed at 05/13/2025 1439   ASA Class  -3.29 filed at 05/13/2025 1439   Creatinine 0 filed at 05/13/2025 1439   Type of Procedure  0.80 filed at 05/13/2025 1439   TEJA Total Score  -6.34 filed at 05/13/2025 1439   TEJA % 0.18 filed at 05/13/2025 1439            Assessment & Plan:    Neuro:  No diagnosis or significant findings on chart review or clinical presentation and evaluation.     HEENT/Airway:  No diagnosis or significant findings on chart review or clinical presentation and evaluation.   STOP-BANG Score-5 points high risk for RULA    Mallampati::  II    TM distance::  >3 FB    Neck ROM::  Full  Dentures-denies  Crowns-denies  Implants-denies    Cardiovascular:  HTN, HLD, RBBB (amlodipine, asa, losartan, metoprolol, pravastatin)   METS: 6.3  RCRI: 0 points, 3.9%  risk for postoperative MACE   TEJA: 0.18% risk for postoperative MACE  EKG -right bundle branch block Rate- 61 No acute changes.     Pulmonary:  No diagnosis or significant findings on chart review or clinical presentation and evaluation.   ARISCAT: <26 points, 1.6% risk of in-hospital postoperative pulmonary complication  PRODIGY: Low risk for opioid induced respiratory depression  Smoking History-He has never smoked.  Deep breathing handout given    Renal/Urinary:  No  diagnosis or significant findings on chart review or clinical presentation and evaluation, however, the patient is at increased risk of perioperative renal complications secondary to HTN. Preventative measures include BP monitoring, medication compliance, and hydration management.   CMP  Creatinine-1.06  GFR-75    Endocrine:  DM2 (metformin)   Hypothyroidism (levothyroxine)     Hematologic/Immunology:  No diagnosis or significant findings on chart review or clinical presentation and evaluation.  The patient is not a Jehovah’s witness and will accept blood and blood products if medically indicated.   History of previous blood transfusions No  CBC  HGB-14.2/44.7  Caprini Score 5, patient at Low for postoperative DVT. Pt supplied education/VTE handout  Anticoagulation use: Yes     Gastrointestinal:   GERD (omeprazole)   Recreational drug use: none  Alcohol use glass of wine with dinner    Infectious disease:   No diagnosis or significant findings on chart review or clinical presentation and evaluation.     Musculoskeletal:   Right knee pain (gabapentin)  JHFRAT score- 5 points. low risk for falls    Anesthesia:  ASA 2 - Patient with mild systemic disease with no functional limitations  Anticipated anesthesia-general  History of General anesthesia- yes  Complications- No anesthesia complications  No family history of anesthesia complications    Labs & Imaging ordered:  EKG  Nickel/metal allergy-negative  Shellfish allergy-negative    Discussed with patient medication instructions, NPO guidelines, and any questions or concerns.      time spent 45 minutes          [1]   Past Medical History:  Diagnosis Date    Arthritis     Cataract     Cholesterolosis of gallbladder     Gallbladder polyp    Decreased white blood cell count, unspecified     Leukopenia    Functional dyspepsia 02/17/2020    Functional dyspepsia    GERD (gastroesophageal reflux disease)     Hyperlipidemia     Hypertension     Hypothyroidism     Other  specified symptoms and signs involving the circulatory and respiratory systems 01/24/2020    Globus sensation    Personal history of other diseases of the circulatory system     Personal history of coronary atherosclerosis    Personal history of other diseases of the digestive system 01/28/2019    History of gastroesophageal reflux (GERD)    Personal history of other specified conditions 06/12/2014    History of fever    Personal history of other specified conditions 03/09/2016    History of fever    Prediabetes     Pre-diabetes    Type 2 diabetes mellitus    [2]   Past Surgical History:  Procedure Laterality Date    KNEE SURGERY Right     10/18/24 meniscus    OTHER SURGICAL HISTORY  02/20/2014    Cardiac Catheter His Ablation    OTHER SURGICAL HISTORY  01/24/2020    Tonsillectomy    OTHER SURGICAL HISTORY  01/24/2020    Parotidectomy    OTHER SURGICAL HISTORY  09/11/2020    History of prior surgery    OTHER SURGICAL HISTORY  09/02/2022    Endoscopy    OTHER SURGICAL HISTORY  09/02/2022    Colonoscopy   [3]   Family History  Problem Relation Name Age of Onset    Diabetes Mother      Heart failure Father     [4] No Known Allergies

## 2025-05-13 NOTE — PREPROCEDURE INSTRUCTIONS
Medication List            Accurate as of May 13, 2025  2:16 PM. Always use your most recent med list.                amLODIPine 5 mg tablet  Commonly known as: Norvasc  Take 1 tablet (5 mg) by mouth once daily.  Medication Adjustments for Surgery: Take/Use as prescribed     cyanocobalamin 1,000 mcg tablet  Commonly known as: Vitamin B-12  Additional Medication Adjustments for Surgery: Take last dose 7 days before surgery  Notes to patient: last dose preoperatively on 5/19/25     gabapentin 300 mg capsule  Commonly known as: Neurontin  Take 1 capsule (300 mg) by mouth 3 times a day.  Medication Adjustments for Surgery: Take/Use as prescribed     levothyroxine 150 mcg tablet  Commonly known as: Synthroid, Levoxyl  Take 1 tablet (150 mcg) by mouth once daily.  Medication Adjustments for Surgery: Take/Use as prescribed     losartan 100 mg tablet  Commonly known as: Cozaar  Take 1 tablet (100 mg) by mouth once daily.  Medication Adjustments for Surgery: Take last dose 1 day (24 hours) before surgery  Notes to patient: last dose preoperatively if taken in the mornings on 5/26/25, if taken in the evenings on 5/25/25     meloxicam 15 mg tablet  Commonly known as: Mobic  Additional Medication Adjustments for Surgery: Take last dose 7 days before surgery  Notes to patient: last dose preoperatively on 5/19/25     metFORMIN 1,000 mg tablet  Commonly known as: Glucophage  Take 1 tablet (1,000 mg) by mouth once daily with breakfast.  Medication Adjustments for Surgery: Do Not take on the morning of surgery     metoprolol succinate XL 50 mg 24 hr tablet  Commonly known as: Toprol-XL  Take 1 tablet (50 mg) by mouth once daily.  Medication Adjustments for Surgery: Take/Use as prescribed     omeprazole 40 mg DR capsule  Commonly known as: PriLOSEC  Take 1 capsule (40 mg) by mouth once daily.  Medication Adjustments for Surgery: Take/Use as prescribed     pravastatin 40 mg tablet  Commonly known as: Pravachol  Take 1 tablet (40  mg) by mouth once daily.  Medication Adjustments for Surgery: Take/Use as prescribed     Vazalore 81 mg capsule  Generic drug: aspirin  Additional Medication Adjustments for Surgery: Take last dose 7 days before surgery  Notes to patient: last dose preoperatively on 5/19/25            NPO Instructions:     Do not eat any food after midnight the night before your surgery/procedure.  You may have clear liquids until TWO hours before surgery/procedure. This includes water, black tea/coffee, (no milk or cream) apple juice and electrolyte drinks (Gatorade).  You may chew gum up to TWO hours before your surgery/procedure.     Additional Instructions:      Seven/Six Days before Surgery:  Review your medication instructions, stop indicated medications  Five Days before Surgery:  Review your medication instructions, stop indicated medications  Three Days before Surgery:  Review your medication instructions, stop indicated medications  The Day before Surgery:  No smoking or alcohol use 24 hours before surgery  Review your medication instructions, stop indicated medications  You will be contacted regarding the time of your arrival to facility and surgery time  Do not eat any food after Midnight  Day of Surgery:  Review your medication instructions, take indicated medications  If you have diabetes, please check your fasting blood sugar upon awakening.  If fasting blood sugar is <80 mg/dl, drink 100 ml of apple juice, time limit of 2 hours before  You may have clear liquids until TWO hours before surgery/procedure.  This includes water, black tea/coffee, (no milk or cream) apple juice and electrolyte drinks (Gatorade)  You may chew gum up to TWO hours before your surgery/procedure  Wear  comfortable loose fitting clothing  Do not use moisturizers, creams, lotions or perfume  All jewelry and valuables should be left at home     CONTACT SURGEON'S OFFICE IF YOU DEVELOP:  * Fever = 100.4 F   * New respiratory symptoms (e.g. cough,  shortness of breath, respiratory distress, sore throat)  * Recent loss of taste or smell  *Flu like symptoms such as headache, fatigue or gastrointestinal symptoms  * You develop any open sores, shingles, burning or painful urination   AND/OR:  * You no longer wish to have the surgery.  * Any other personal circumstances change that may lead to the need to cancel or defer this surgery.  *You were admitted to any hospital within one week of your planned procedure.     SMOKING:  *Quitting smoking can make a huge difference to your health and recovery from surgery.    *If you need help with quitting, call 9-804-QUIT-NOW.     THE DAY BEFORE SURGERY:  *Do not eat any food after midnight the night before your surgery.   *You may have up to TEN OUNCES of clear liquids until TWO hours before your instructed ARRIVAL TIME to hospital. This includes water, black tea/coffee, (no milk or cream) apple juice, clear broth and electrolyte drinks (Gatorade). Please avoid clear liquids that are red in color.   *You may chew gum/mints up to TWO hours before your surgery/procedure.     SURGICAL TIME:  *You will be contacted between 2 p.m. and 3 p.m. the business day before your surgery with your arrival time.  *If you haven't received a call by 3pm, call [phone]  *Scheduled surgery times may change and you will be notified if this occurs-check your personal voicemail for any updates.     ON THE MORNING OF SURGERY:  *Wear comfortable, loose fitting clothing.   *Do not use moisturizers, creams, lotions or perfume.  *All jewelry and valuables should be left at home.  *Prosthetic devices such as contact lenses, hearing aids, dentures, eyelash extensions, hairpins and body piercing must be removed before surgery.     BRING WITH YOU:  *Photo ID and insurance card  *Current list of medications and allergies  *Pacemaker/Defibrillator/Heart stent cards  *CPAP machine and mask  *Slings/splints/crutches  *Copy of your complete Advanced  Directive/DHPOA-if applicable  *Neurostimulator implant remote     PARKING AND ARRIVAL:  *Check in at the Main Entrance desk and let them know you are here for surgery.     IF YOU ARE HAVING OUTPATIENT/SAME DAY SURGERY:  *A responsible adult MUST accompany you at the time of discharge and stay with you for 24 hours after your surgery.  *You may NOT drive yourself home after surgery.  *You may use a taxi or ride sharing service ([employer], [employer]) to return home ONLY if you are accompanied by a friend or family member.  *Instructions for resuming your medications will be provided by your surgeon.     Thank you for coming to Pre Admission testing.      If I have prescribed medication please don't forget to  at your pharmacy.      Any questions about today's visit call [phone] and leave a message in the general mailbox.     Patient instructed to ambulate as soon as possible postoperatively to decrease thromboembolic risk.      Thank you for visiting [facility].  If you have any changes to your health condition, please call the surgeons office to alert them and give them details of your symptoms.        Preoperative Fasting Guidelines     Why must I stop eating and drinking near surgery time?  With sedation, food or liquid in your stomach can enter your lungs causing serious complications  Increases nausea and vomiting     When do I need to stop eating and drinking before my surgery?  Do not eat any food after midnight the night before your surgery/procedure.  You may have up to TEN ounces of clear liquid until TWO hours before your instructed arrival time to the hospital.  This includes water, black tea/coffee, (no milk or cream) apple juice, and electrolyte drinks (Gatorade)  You may chew gum until TWO hours before your surgery/procedure        Additional Instructions:      The Day before Surgery:  -Review your medication instructions, stop indicated medications  -You will be contacted  in the evening regarding the time of your arrival to facility and surgery time     Day of Surgery:  -Review your medication instructions, take indicated medications  -Wear comfortable loose fitting clothing  -Do not use moisturizers, creams, lotions or perfume  -All jewelry and valuables should be left at home                   Preoperative Brain Exercises     What are brain exercises?  A brain exercise is any activity that engages your thinking (cognitive) skills.     What types of activities are considered brain exercises?  Jigsaw puzzles, crossword puzzles, word jumble, memory games, word search, and many more.  Many can be found free online or on your phone via a mobile jerry.     Why should I do brain exercises before my surgery?  More recent research has shown brain exercise before surgery can lower the risk of postoperative delirium (confusion) which can be especially important for older adults.  Patients who did brain exercises for 5 to 10 minutes/day in the days before surgery, cut their risk of postoperative delirium in half up to 1 week after surgery.                         [facility]     Preoperative Deep Breathing Exercises     Why it is important to do deep breathing exercises before my surgery?  Deep breathing exercises strengthen your breathing muscles.  This helps you to recover after your surgery and decreases the chance of breathing complications.        How are the deep breathing exercises done?  Sit straight with your back supported.  Breathe in deeply and slowly through your nose. Your lower rib cage should expand and your abdomen may move forward.  Hold that breath for 3 to 5 seconds.  Breathe out through pursed lips, slowly and completely.  Rest and repeat 10 times every hour while awake.  Rest longer if you become dizzy or lightheaded.                      [facility]     Preoperative Deep Breathing Exercises     Why it is important to do  deep breathing exercises before my surgery?  Deep breathing exercises strengthen your breathing muscles.  This helps you to recover after your surgery and decreases the chance of breathing complications.        How are the deep breathing exercises done?  Sit straight with your back supported.  Breathe in deeply and slowly through your nose. Your lower rib cage should expand and your abdomen may move forward.  Hold that breath for 3 to 5 seconds.  Breathe out through pursed lips, slowly and completely.  Rest and repeat 10 times every hour while awake.  Rest longer if you become dizzy or lightheaded.        Patient Information: Incentive Spirometer  What is an incentive spirometer?  An incentive spirometer is a device used before and after surgery to “exercise” your lungs.  It helps you to take deeper breaths to expand your lungs.  Below is an example of a basic incentive spirometer.  The device you receive may differ slightly but they all function the same.    Why do I need to use an incentive spirometer?  Using your incentive spirometer prepares your lungs for surgery and helps prevent lung problems after surgery.  How do I use my incentive spirometer?  When you're using your incentive spirometer, make sure to breathe through your mouth. If you breathe through your nose, the incentive spirometer won't work properly. You can hold your nose if you have trouble.  If you feel dizzy at any time, stop and rest. Try again at a later time.  Follow the steps below:  Set up your incentive spirometer, expand the flexible tubing and connect to the outlet.  Sit upright in a chair or bed. Hold the incentive spirometer at eye level.   Put the mouthpiece in your mouth and close your lips tightly around it. Slowly breathe out (exhale) completely.  Breathe in (inhale) slowly through your mouth as deeply as you can. As you take a breath, you will see the piston rise inside the large column. While the piston rises, the indicator should  move upwards. It should stay in between the 2 arrows (see Figure).  Try to get the piston as high as you can, while keeping the indicator between the arrows.   If the indicator doesn't stay between the arrows, you're breathing either too fast or too slow.  When you get it as high as you can, hold your breath for 10 seconds, or as long as possible. While you're holding your breath, the piston will slowly fall to the base of the spirometer.  Once the piston reaches the bottom of the spirometer, breathe out slowly through your mouth. Rest for a few seconds.  Repeat 10 times. Try to get the piston to the same level with each breath.  Repeat every hour while awake  You can carefully clean the outside of the mouthpiece with an alcohol wipe or soap and water.       Patient and Family Education             Ways You Can Help Prevent Blood Clots                    This handout explains some simple things you can do to help prevent blood clots.      Blood clots are blockages that can form in the body's veins. When a blood clot forms in your deep veins, it may be called a deep vein thrombosis, or DVT for short. Blood clots can happen in any part of the body where blood flows, but they are most common in the arms and legs. If a piece of a blood clot breaks free and travels to the lungs, it is called a pulmonary embolus (PE). A PE can be a very serious problem.         Being in the hospital or having surgery can raise your chances of getting a blood clot because you may not be well enough to move around as much as you normally do.         Ways you can help prevent blood clots in the hospital           Wearing SCDs. SCDs stands for Sequential Compression Devices.   SCDs are special sleeves that wrap around your legs  They attach to a pump that fills them with air to gently squeeze your legs every few minutes.   This helps return the blood in your legs to your heart.   SCDs should only be taken off when walking or bathing.   SCDs  may not be comfortable, but they can help save your life.                                            Wearing compression stockings - if your doctor orders them. These special snug fitting stockings gently squeeze your legs to help blood flow.       Walking. Walking helps move the blood in your legs.   If your doctor says it is ok, try walking the halls at least   5 times a day. Ask us to help you get up, so you don't fall.      Taking any blood thinning medicines your doctor orders.        Page 1 of 2            [facility]; 3/23   Ways you can help prevent blood clots at home         Wearing compression stockings - if your doctor orders them. ? Walking - to help move the blood in your legs.       Taking any blood thinning medicines your doctor orders.      Signs of a blood clot or PE        Tell your doctor or nurse know right away if you have of the problems listed below.    If you are at home, seek medical care right away. Call 911 for chest pain or problems breathing.                Signs of a blood clot (DVT) - such as pain,  swelling, redness or warmth in your arm or leg      Signs of a pulmonary embolism (PE) - such as chest pain or feeling short of breath

## 2025-05-13 NOTE — CPM/PAT H&P
[facility]/PAT Evaluation       Name: Raphael Crain (Raphael Crain)  /Age: 1955/69 y.o.     In-Person       Chief Complaint: Acute medial meniscus tear, right, Synovitis of right knee     HPI  Patient is an alert and oriented 69 year old male scheduled for a  Right knee arthroscopy, partial medial meniscectomy, intra-articular debridement and synovectomy, chondroplasty on 25 with Dr. Anderson for  Acute medial meniscus tear, right, Synovitis of right knee . PMHX includes HTN, hypothyroid, GERD, DM2, HLD, anemia. Presents to [facility] today for perioperative risk stratification and optimization.     Medical History[1]    Surgical History[2]    Patient  has no history on file for sexual activity.    Family History[3]    Allergies[4]    Prior to Admission medications    Medication Sig Start Date End Date Taking? Authorizing Provider   amLODIPine (Norvasc) 5 mg tablet Take 1 tablet (5 mg) by mouth once daily. 3/19/25  Yes Keely Bradley MD   aspirin (Vazalore) 81 mg capsule Take 81 mg by mouth once daily.   Yes Historical Provider, MD   cyanocobalamin (Vitamin B-12) 1,000 mcg tablet Take 1 tablet (1,000 mcg) by mouth once daily.   Yes Historical Provider, MD   levothyroxine (Synthroid, Levoxyl) 150 mcg tablet Take 1 tablet (150 mcg) by mouth once daily. 3/19/25 3/19/26 Yes Keely Bradley MD   losartan (Cozaar) 100 mg tablet Take 1 tablet (100 mg) by mouth once daily. 3/19/25 9/15/25 Yes Keely Bradley MD   meloxicam (Mobic) 15 mg tablet Take 1 tablet (15 mg) by mouth once daily.   Yes Historical Provider, MD   metFORMIN (Glucophage) 1,000 mg tablet Take 1 tablet (1,000 mg) by mouth once daily with breakfast. 3/28/25 5/2/26 Yes Keely Bradley MD   metoprolol succinate XL (Toprol-XL) 50 mg 24 hr tablet Take 1 tablet (50 mg) by mouth once daily. 3/28/25 3/28/26 Yes Keely Bradley MD   omeprazole (PriLOSEC) 40 mg DR capsule Take 1 capsule (40 mg) by mouth once daily. 3/19/25 9/15/25 Yes Keely Bradley MD  "  pravastatin (Pravachol) 40 mg tablet Take 1 tablet (40 mg) by mouth once daily. 3/19/25 9/15/25 Yes Keely Bradley MD   gabapentin (Neurontin) 300 mg capsule Take 1 capsule (300 mg) by mouth 3 times a day.  Patient not taking: Reported on 4/14/2025 3/4/24 3/28/25  Boris Michel MD        Visit Vitals  /75   Pulse 60   Temp 36.4 °C (97.5 °F)   Resp 16   Ht 1.778 m (5' 10\")   Wt 88.5 kg (195 lb)   SpO2 99%   BMI 27.98 kg/m²   Smoking Status Former   BSA 2.09 m²     Review of Systems   Constitutional: Negative for chills, decreased appetite, diaphoresis, fever and malaise/fatigue.   Eyes:  Negative for blurred vision and double vision.   Cardiovascular:  Negative for chest pain, claudication, cyanosis, dyspnea on exertion, irregular heartbeat, leg swelling, near-syncope and palpitations.   Respiratory:  Negative for cough, hemoptysis, shortness of breath and wheezing.    Endocrine: Negative for cold intolerance, heat intolerance, polydipsia, polyphagia and polyuria.   Gastrointestinal:  Negative for abdominal pain, constipation, diarrhea, dysphagia, nausea and vomiting.   Genitourinary:  Negative for bladder incontinence, dysuria, hematuria, incomplete emptying, nocturia, frequency, pelvic pain and urgency.   Neurological:  Negative for headaches, light-headedness, paresthesias, sensory change and weakness.   Psychiatric/Behavioral:  Negative for altered mental status.    Musculoskeletal: Positive for myalgias, arthralgias     Vitals and nursing note reviewed.     Physical exam  Constitutional:       Appearance: Normal appearance. He is Overweight.   HENT:      Head: Normocephalic and atraumatic.      Mouth/Throat:      Mouth: Mucous membranes are moist.      Pharynx: Oropharynx is clear.   Eyes:      Extraocular Movements: Extraocular movements intact.      Conjunctiva/sclera: Conjunctivae normal.      Pupils: Pupils are equal, round, and reactive to light.   Cardiovascular:      PMI at left midclavicular " line. Normal rate. Regular rhythm. Normal S1. Normal S2.       Murmurs: There is no murmur.      No gallop.  No click. No rub.       No audible carotid bruit     No lower extremity edema on exam  Pulmonary:      Effort: Pulmonary effort is normal.      Breath sounds: Normal breath sounds.   Abdominal:      General: Abdomen is flat. Bowel sounds are normal.      Palpations: Abdomen is soft and non-tender  Musculoskeletal:      Cervical back: Normal range of motion and neck supple.   Skin:     General: Skin is warm and dry.      Capillary Refill: Capillary refill takes less than 2 seconds.   Neurological:      General: No focal deficit present.      Mental Status: He is alert and oriented to person, place, and time. Mental status is at baseline.   Psychiatric:         Mood and Affect: Mood normal.         Behavior: Behavior normal.         Thought Content: Thought content normal.         Judgment: Judgment normal.     Vitals and nursing note reviewed. Physical exam within normal limits.     DASI Risk Score      Flowsheet Row Pre-Admission Testing from 5/13/2025 in [facility] Pre-Admission Testing from 10/10/2024 in [facility]   Can you take care of yourself (eat, dress, bathe, or use toilet)?  2.75 filed at 05/13/2025 1439 2.75 filed at 10/10/2024 1547   Can you walk indoors, such as around your house? 1.75 filed at 05/13/2025 1439 1.75 filed at 10/10/2024 1547   Can you walk a block or two on level ground?  2.75 filed at 05/13/2025 1439 2.75 filed at 10/10/2024 1547   Can you climb a flight of stairs or walk up a hill? 5.5 filed at 05/13/2025 1439 5.5 filed at 10/10/2024 1547   Can you run a short distance? 0 filed at 05/13/2025 1439 0 filed at 10/10/2024 1547   Can you do light work around the house like dusting or washing dishes? 2.7 filed at 05/13/2025 1439 2.7 filed at 10/10/2024 1547   Can you do moderate work around the house like vacuuming, sweeping floors or carrying  groceries? 3.5 filed at 05/13/2025 1439 3.5 filed at 10/10/2024 1547   Can you do heavy work around the house like scrubbing floors or lifting and moving heavy furniture?  0 filed at 05/13/2025 1439 8 filed at 10/10/2024 1547   Can you do yard work like raking leaves, weeding or pushing a mower? 4.5 filed at 05/13/2025 1439 4.5 filed at 10/10/2024 1547   Can you have sexual relations? 5.25 filed at 05/13/2025 1439 5.25 filed at 10/10/2024 1547   Can you participate in moderate recreational activities like golf, bowling, dancing, doubles tennis or throwing a baseball or football? 0 filed at 05/13/2025 1439 6 filed at 10/10/2024 1547   Can you participate in strenous sports like swimming, singles tennis, football, basketball, or skiing? 0 filed at 05/13/2025 1439 0 filed at 10/10/2024 1547   DASI SCORE 28.7 filed at 05/13/2025 1439 42.7 filed at 10/10/2024 1547   METS Score (Will be calculated only when all the questions are answered) 6.3 filed at 05/13/2025 1439 8 filed at 10/10/2024 1547          Caprini DVT Assessment      Flowsheet Row Pre-Admission Testing from 5/13/2025 in [facility]   DVT Score (IF A SCORE IS NOT CALCULATING, MUST SELECT A BMI TO COMPLETE) 5 filed at 05/13/2025 1438   Surgical Factors Major surgery planned, including arthroscopic and laproscopic (1-2 hours) filed at 05/13/2025 1438   BMI (BMI MUST BE CHOSEN) 30 or less filed at 05/13/2025 1438          Modified Frailty Index    No data to display       [id number] Stroke Risk Points  Current as of 2 minutes ago        N/A 0 to 9 Points:      Last Change: N/A          The [id number] risk score (Lip JACQUELINE, et al. 2009. © 2010 [employer]) quantifies the risk of stroke for a patient with atrial fibrillation. For patients without atrial fibrillation or under the age of 18 this score appears as N/A. Higher score values generally indicate higher risk of stroke.        This score is not applicable to this  patient. Components are not calculated.          Revised Cardiac Risk Index      Flowsheet Row Pre-Admission Testing from 5/13/2025 in [facility]   High-Risk Surgery (Intraperitoneal, Intrathoracic,Suprainguinal vascular) 0 filed at 05/13/2025 1439   History of ischemic heart disease (History of MI, History of positive exercuse test, Current chest paint considered due to myocardial ischemia, Use of nitrate therapy, ECG with pathological Q Waves) 0 filed at 05/13/2025 1439   History of congestive heart failure (pulmonary edemia, bilateral rales or S3 gallop, Paroxysmal nocturnal dyspnea, CXR showing pulmonary vascular redistribution) 0 filed at 05/13/2025 1439   History of cerebrovascular disease (Prior TIA or stroke) 0 filed at 05/13/2025 1439   Pre-operative insulin treatment 0 filed at 05/13/2025 1439   Pre-operative creatinine>2 mg/dl 0 filed at 05/13/2025 1439   Revised Cardiac Risk Calculator 0 filed at 05/13/2025 1439          Apfel Simplified Score    No data to display       Risk Analysis Index Results This Encounter    No data found in the last 10 encounters.       Stop Bang Score      Flowsheet Row Pre-Admission Testing from 5/13/2025 in [facility] Clinical Support from 4/23/2025 in [facility]   Do you snore loudly? 1 filed at 05/13/2025 1337 1 filed at 04/23/2025 0949   Do you often feel tired or fatigued after your sleep? 1 filed at 05/13/2025 1337 0 filed at 04/23/2025 0949   Has anyone ever observed you stop breathing in your sleep? 0 filed at 05/13/2025 1337 0 filed at 04/23/2025 0949   Do you have or are you being treated for high blood pressure? 1 filed at 05/13/2025 1337 1 filed at 04/23/2025 0949   Recent BMI (Calculated) 28 filed at 05/13/2025 1337 27.6 filed at 04/23/2025 0949   Is BMI greater than 35 kg/m2? 0=No filed at 05/13/2025 1337 0=No filed at 04/23/2025 0949   Age older than 50 years old? 1=Yes filed at 05/13/2025 3434 1=Yes filed at  04/23/2025 0949   Is your neck circumference greater than 17 inches (Male) or 16 inches (Female)? 0 filed at 05/13/2025 1337 --   Gender - Male 1=Yes filed at 05/13/2025 1337 1=Yes filed at 04/23/2025 0949   STOP-BANG Total Score 5 filed at 05/13/2025 1337 --          Prodigy: High Risk  Total Score: 20              Prodigy Age Score      Prodigy Gender Score          ARISCAT Score for Postoperative Pulmonary Complications    No data to display       Rishi Perioperative Risk for Myocardial Infarction or Cardiac Arrest (TEJA)      Flowsheet Row Pre-Admission Testing from 5/13/2025 in [facility]   Calculated Age Score 1.4 filed at 05/13/2025 1439   Functional Status  0 filed at 05/13/2025 1439   ASA Class  -3.29 filed at 05/13/2025 1439   Creatinine 0 filed at 05/13/2025 1439   Type of Procedure  0.80 filed at 05/13/2025 1439   TEJA Total Score  -6.34 filed at 05/13/2025 1439   TEJA % 0.18 filed at 05/13/2025 1439            Assessment & Plan:    Neuro:  No diagnosis or significant findings on chart review or clinical presentation and evaluation.     HEENT/Airway:  No diagnosis or significant findings on chart review or clinical presentation and evaluation.   STOP-BANG Score-5 points high risk for RULA    Mallampati::  II    TM distance::  >3 FB    Neck ROM::  Full  Dentures-denies  Crowns-denies  Implants-denies    Cardiovascular:  HTN, HLD, RBBB (amlodipine, asa, losartan, metoprolol, pravastatin)   METS: 6.3  RCRI: 0 points, 3.9%  risk for postoperative MACE   TEJA: 0.18% risk for postoperative MACE  EKG -right bundle branch block Rate- 61 No acute changes.     Pulmonary:  No diagnosis or significant findings on chart review or clinical presentation and evaluation.   ARISCAT: <26 points, 1.6% risk of in-hospital postoperative pulmonary complication  PRODIGY: Low risk for opioid induced respiratory depression  Smoking History-He has never smoked.  Deep breathing handout given    Renal/Urinary:  No  diagnosis or significant findings on chart review or clinical presentation and evaluation, however, the patient is at increased risk of perioperative renal complications secondary to HTN. Preventative measures include BP monitoring, medication compliance, and hydration management.   CMP  Creatinine-1.06  GFR-75    Endocrine:  DM2 (metformin)   Hypothyroidism (levothyroxine)     Hematologic/Immunology:  No diagnosis or significant findings on chart review or clinical presentation and evaluation.  The patient is not a Jehovah’s witness and will accept blood and blood products if medically indicated.   History of previous blood transfusions No  CBC  HGB-14.2/44.7  Caprini Score 5, patient at Low for postoperative DVT. Pt supplied education/VTE handout  Anticoagulation use: Yes     Gastrointestinal:   GERD (omeprazole)   Recreational drug use: none  Alcohol use glass of wine with dinner    Infectious disease:   No diagnosis or significant findings on chart review or clinical presentation and evaluation.     Musculoskeletal:   Right knee pain (gabapentin)  JHFRAT score- 5 points. low risk for falls    Anesthesia:  ASA 2 - Patient with mild systemic disease with no functional limitations  Anticipated anesthesia-general  History of General anesthesia- yes  Complications- No anesthesia complications  No family history of anesthesia complications    Labs & Imaging ordered:  EKG  Nickel/metal allergy-negative  Shellfish allergy-negative    Discussed with patient medication instructions, NPO guidelines, and any questions or concerns.      time spent 45 minutes          [1]   Past Medical History:  Diagnosis Date    Arthritis     Cataract     Cholesterolosis of gallbladder     Gallbladder polyp    Decreased white blood cell count, unspecified     Leukopenia    Functional dyspepsia 02/17/2020    Functional dyspepsia    GERD (gastroesophageal reflux disease)     Hyperlipidemia     Hypertension     Hypothyroidism     Other  specified symptoms and signs involving the circulatory and respiratory systems 01/24/2020    Globus sensation    Personal history of other diseases of the circulatory system     Personal history of coronary atherosclerosis    Personal history of other diseases of the digestive system 01/28/2019    History of gastroesophageal reflux (GERD)    Personal history of other specified conditions 06/12/2014    History of fever    Personal history of other specified conditions 03/09/2016    History of fever    Prediabetes     Pre-diabetes    Type 2 diabetes mellitus    [2]   Past Surgical History:  Procedure Laterality Date    KNEE SURGERY Right     10/18/24 meniscus    OTHER SURGICAL HISTORY  02/20/2014    Cardiac Catheter His Ablation    OTHER SURGICAL HISTORY  01/24/2020    Tonsillectomy    OTHER SURGICAL HISTORY  01/24/2020    Parotidectomy    OTHER SURGICAL HISTORY  09/11/2020    History of prior surgery    OTHER SURGICAL HISTORY  09/02/2022    Endoscopy    OTHER SURGICAL HISTORY  09/02/2022    Colonoscopy   [3]   Family History  Problem Relation Name Age of Onset    Diabetes Mother      Heart failure Father     [4] No Known Allergies

## 2025-05-17 LAB
ATRIAL RATE: 61 BPM
P AXIS: 57 DEGREES
P OFFSET: 196 MS
P ONSET: 144 MS
PR INTERVAL: 146 MS
Q ONSET: 217 MS
QRS COUNT: 10 BEATS
QRS DURATION: 132 MS
QT INTERVAL: 426 MS
QTC CALCULATION(BAZETT): 428 MS
QTC FREDERICIA: 428 MS
R AXIS: 42 DEGREES
T AXIS: 10 DEGREES
T OFFSET: 430 MS
VENTRICULAR RATE: 61 BPM

## 2025-05-20 ENCOUNTER — APPOINTMENT (OUTPATIENT)
Dept: GASTROENTEROLOGY | Facility: CLINIC | Age: 70
End: 2025-05-20
Payer: MEDICARE

## 2025-05-20 VITALS — WEIGHT: 195 LBS | HEIGHT: 70 IN | BODY MASS INDEX: 27.92 KG/M2

## 2025-05-20 DIAGNOSIS — R10.13 DYSPEPSIA: ICD-10-CM

## 2025-05-20 DIAGNOSIS — R19.5 LOOSE STOOLS: ICD-10-CM

## 2025-05-20 DIAGNOSIS — K30 FUNCTIONAL DYSPEPSIA: Primary | ICD-10-CM

## 2025-05-20 DIAGNOSIS — Z12.11 COLON CANCER SCREENING: ICD-10-CM

## 2025-05-20 PROCEDURE — 99214 OFFICE O/P EST MOD 30 MIN: CPT | Performed by: INTERNAL MEDICINE

## 2025-05-20 PROCEDURE — 1159F MED LIST DOCD IN RCRD: CPT | Performed by: INTERNAL MEDICINE

## 2025-05-20 PROCEDURE — 3008F BODY MASS INDEX DOCD: CPT | Performed by: INTERNAL MEDICINE

## 2025-05-20 PROCEDURE — 4010F ACE/ARB THERAPY RXD/TAKEN: CPT | Performed by: INTERNAL MEDICINE

## 2025-05-20 RX ORDER — POLYETHYLENE GLYCOL 3350, SODIUM SULFATE ANHYDROUS, SODIUM BICARBONATE, SODIUM CHLORIDE, POTASSIUM CHLORIDE 236; 22.74; 6.74; 5.86; 2.97 G/4L; G/4L; G/4L; G/4L; G/4L
4 POWDER, FOR SOLUTION ORAL ONCE
Qty: 4000 ML | Refills: 0 | Status: SHIPPED | OUTPATIENT
Start: 2025-05-20 | End: 2025-05-20

## 2025-05-20 RX ORDER — DICYCLOMINE HYDROCHLORIDE 20 MG/1
20 TABLET ORAL 4 TIMES DAILY PRN
Qty: 120 TABLET | Refills: 11 | Status: SHIPPED | OUTPATIENT
Start: 2025-05-20

## 2025-05-20 ASSESSMENT — ENCOUNTER SYMPTOMS: SHORTNESS OF BREATH: 0

## 2025-05-20 NOTE — PATIENT INSTRUCTIONS
Continue Dicyclomine as needed. Agree with weaning down on dose of Omeprazole as tolerated. Schedule colonoscopy. Increase dietary fiber to 20-30 grams per day and/or start Metamucil 2 teaspoonfuls mixed with 8 ounces of juice or water once daily.

## 2025-05-20 NOTE — PROGRESS NOTES
REASON FOR VISIT:  Dyspepsia   PCP (requesting provider): Keely Bradley MD.    HPI:  Raphael Crain is a 69 y.o. male with a past medical history of HTN, HLD, DM, hypothyroidism, and pernicious anemia (parietal cell antibody positive in 9/2021) on B12 injections being evaluated for dyspepsia. Esophagram showed spontaneous reflux but otherwise normal (1/2020). EGD showed mild atrophic gastritis (6/2020). TTG IgA negative (9/2021). Symptoms improved on Amitriptyline and Dicyclomine.    The patient stopped Amitriptyline due to fogginess in the morning. He continues on Dicyclomine with improvement in his symptoms. He has some increased fecal urgency and loose stool. Patient is due this year for colonoscopy. No blood in the stool. He takes baby ASA but no other NSAIDs. He takes Dicyclomine up to 3 per day. He continues on Omeprazole but only taking it 4-5 days per week.      PSurgHx:  -Appendectomy     FamHx: Father with stomach cancer, paternal aunt with colon cancer.     Prior Endoscopy:  -EGD (6/2020): Normal esophagus, mild atrophic gastritis (H. pylori -), normal duodenum.  -EGD (3/2017): Normal esophagus (EoE negative), normal stomach (H. pylori negative), normal duodenum.  -Colonoscopy (11/2015): Good prep, IH, otherwise normal colon.     PAST MEDICAL HISTORY  Medical History[1]    PAST SURGICAL HISTORY  Surgical History[2]    FAMILY HISTORY  Family History[3]    SOCIAL HISTORY   reports that he quit smoking about 28 years ago. His smoking use included cigarettes. He started smoking about 52 years ago. He has a 48 pack-year smoking history. He has been exposed to tobacco smoke. He has never used smokeless tobacco. He reports current alcohol use of about 14.0 standard drinks of alcohol per week. He reports that he does not use drugs.    REVIEW OF SYSTEMS  Review of Systems   Respiratory:  Negative for shortness of breath.    Cardiovascular:  Negative for chest pain.   All other systems reviewed and are  negative.    A 10+ point review of systems was otherwise negative except as noted and per HPI.    ALLERGIES  Allergies[4]    MEDICATIONS  Current Outpatient Medications   Medication Instructions    amLODIPine (NORVASC) 5 mg, oral, Daily    cyanocobalamin (VITAMIN B-12) 1,000 mcg, Daily RT    gabapentin (NEURONTIN) 300 mg, oral, 3 times daily    levothyroxine (SYNTHROID, LEVOXYL) 150 mcg, oral, Daily    losartan (COZAAR) 100 mg, oral, Daily    meloxicam (MOBIC) 15 mg, Daily    metFORMIN (GLUCOPHAGE) 1,000 mg, oral, Daily with breakfast    metoprolol succinate XL (TOPROL-XL) 50 mg, oral, Daily    omeprazole (PRILOSEC) 40 mg, oral, Daily    pravastatin (PRAVACHOL) 40 mg, oral, Daily RT    Vazalore 81 mg, Daily RT       VITALS  There were no vitals filed for this visit.   There is no height or weight on file to calculate BMI.    PHYSICAL EXAM  CONSTITUTIONAL: NAD, appears stated age  EYES: anicteric sclera, sclera clear  HEAD: normocephalic, atraumatic   NECK: supple   PULMONARY: CTAB  CARDIOVASCULAR: RRR, no M/R/G appreciated   ABDOMEN: soft, NTND, +BS, no rebound or guarding   MUSCULOSKELETAL: no edema  SKIN: no jaundice   PSYCHIATRIC: AOx3, appropriate insight and judgement    LABS  WBC   Date Value   10/10/2024 4.4 x10*3/uL   08/24/2023 3.8 x10E9/L (L)   04/12/2023 3.3 x10E9/L (L)   04/27/2022 3.7 x10E9/L (L)     WHITE BLOOD CELL COUNT (Thousand/uL)   Date Value   03/14/2025 4.2     Hemoglobin (g/dL)   Date Value   10/10/2024 13.4 (L)   08/24/2023 14.2   04/12/2023 13.6   04/27/2022 14.4     HEMOGLOBIN (g/dL)   Date Value   03/14/2025 14.2     Platelets   Date Value   10/10/2024 146 x10*3/uL (L)   08/24/2023 167 x10E9/L   04/12/2023 133 x10E9/L (L)   04/27/2022 152 x10E9/L     PLATELET COUNT (Thousand/uL)   Date Value   03/14/2025 152     SODIUM (mmol/L)   Date Value   03/14/2025 142     Sodium (mmol/L)   Date Value   10/10/2024 144   08/24/2023 139   04/12/2023 140   04/27/2022 140     POTASSIUM (mmol/L)   Date  "Value   03/14/2025 4.5     Potassium (mmol/L)   Date Value   10/10/2024 4.2   08/24/2023 4.5   04/12/2023 4.2   04/27/2022 4.1     CHLORIDE (mmol/L)   Date Value   03/14/2025 106     Chloride (mmol/L)   Date Value   10/10/2024 107   08/24/2023 105   04/12/2023 106   04/27/2022 103     CARBON DIOXIDE (mmol/L)   Date Value   03/14/2025 26     Bicarbonate (mmol/L)   Date Value   10/10/2024 29   08/24/2023 29   04/12/2023 27   04/27/2022 29     UREA NITROGEN (BUN) (mg/dL)   Date Value   03/14/2025 18     Urea Nitrogen (mg/dL)   Date Value   10/10/2024 20   08/24/2023 17   04/12/2023 14   04/27/2022 18     Creatinine (mg/dL)   Date Value   10/10/2024 1.18   08/24/2023 1.09   04/12/2023 1.08   04/27/2022 1.18     CREATININE (mg/dL)   Date Value   03/14/2025 1.06     CALCIUM (mg/dL)   Date Value   03/14/2025 8.9     Calcium (mg/dL)   Date Value   10/10/2024 9.3   08/24/2023 9.2   04/12/2023 8.4 (L)   04/27/2022 9.0     Total Protein (g/dL)   Date Value   10/10/2024 6.8   02/26/2020 6.5   04/06/2018 6.9     Bilirubin, Total (mg/dL)   Date Value   10/10/2024 0.4     Total Bilirubin (mg/dL)   Date Value   02/26/2020 0.8   04/06/2018 0.7     Alkaline Phosphatase (U/L)   Date Value   10/10/2024 56   02/26/2020 50   04/06/2018 53     ALT (U/L)   Date Value   03/14/2025 27   10/10/2024 27     ALT (SGPT) (U/L)   Date Value   08/24/2023 25   04/12/2023 25   04/27/2022 29     AST (U/L)   Date Value   10/10/2024 22   02/26/2020 21   04/06/2018 21     GLUCOSE (mg/dL)   Date Value   03/14/2025 151 (H)     Glucose (mg/dL)   Date Value   10/10/2024 193 (H)   08/24/2023 119 (H)   04/12/2023 129 (H)   04/27/2022 133 (H)     No results found for: \"LIPASE\", \"CRP\"    ASSESSMENT/PLAN  Raphael Crain is a 69 y.o. male with a past medical history of HTN, HLD, DM, hypothyroidism, and pernicious anemia (parietal cell antibody positive in 9/2021) on B12 injections being evaluated for dyspepsia. Esophagram showed spontaneous reflux but otherwise " normal (1/2020). EGD showed mild atrophic gastritis (6/2020). TTG IgA negative (9/2021). Stopped Amitriptyline due to morning fogginess. He is doing well on just Dicyclomine. He is due for screening colonoscopy. He has had some loose stools and fecal urgency as well so will trial fiber.    -Plan for colonoscopy and if still having loose stools then would biopsy for microscopic colitis at that time  -The procedure(s) including risks/benefits, diet restrictions, prep, and sedation were discussed with the patient  -Continue Dicyclomine 20 mg QID PRN   -Advise high fiber diet and/or start Metamucil     Follow-up in the office in 1 year.    Signature: Farhad Linton MD       [1]   Past Medical History:  Diagnosis Date    Arthritis     Cataract     Cholesterolosis of gallbladder     Gallbladder polyp    Decreased white blood cell count, unspecified     Leukopenia    Functional dyspepsia 02/17/2020    Functional dyspepsia    GERD (gastroesophageal reflux disease)     Hyperlipidemia     Hypertension     Hypothyroidism     Other specified symptoms and signs involving the circulatory and respiratory systems 01/24/2020    Globus sensation    Personal history of other diseases of the circulatory system     Personal history of coronary atherosclerosis    Personal history of other diseases of the digestive system 01/28/2019    History of gastroesophageal reflux (GERD)    Personal history of other specified conditions 06/12/2014    History of fever    Personal history of other specified conditions 03/09/2016    History of fever    Prediabetes     Pre-diabetes    Type 2 diabetes mellitus    [2]   Past Surgical History:  Procedure Laterality Date    KNEE SURGERY Right     10/18/24 meniscus    OTHER SURGICAL HISTORY  02/20/2014    Cardiac Catheter His Ablation    OTHER SURGICAL HISTORY  01/24/2020    Tonsillectomy    OTHER SURGICAL HISTORY  01/24/2020    Parotidectomy    OTHER SURGICAL HISTORY  09/11/2020    History of prior surgery     OTHER SURGICAL HISTORY  09/02/2022    Endoscopy    OTHER SURGICAL HISTORY  09/02/2022    Colonoscopy   [3]   Family History  Problem Relation Name Age of Onset    Diabetes Mother      Heart failure Father     [4] No Known Allergies

## 2025-05-21 NOTE — DISCHARGE INSTRUCTIONS
Suhail Anderson M.D.   Sports Medicine Orthopaedic Surgery    [facility], [place]  [address] [facility]                  [address] [facility]       [address]  [facility], [address]   Phone: [phone]         Phone: [phone]       Phone: [phone]   Fax: [phone]                         Fax: [phone]       Fax: [phone]     After hours phone number: [phone]    AFTER SURGERY     Anesthesia  If you received a nerve block during surgery, you may have numbness or inability to move the limb. Do not be alarmed as this may last 8-36 hours depending upon the amount and type of medication used by the anesthesiologist. Make sure If you are experiencing numbness after 36 hours, please call the office. When the nerve block begins to wear off, you will feel a tingling sensation, like pins and needles. It is important that you start taking the pain medication at that time to ensure that you “stay ahead of the pain.” It is important to take the pain medicine when the pain level is a 4 or 5/10, before it gets too high.    Do not operate heavy machinery, make major decisions, drink alcohol or smoke for 24 hours. Do not drink alcohol while taking pain medications.    Prescribed Medications   Narcotic pain medicine (Oxycodone): The goal of post-operative pain management is pain control, NOT pain elimination. You should expect some pain after surgery - this pain helps you protect itself while it is healing. Constipation, nausea, itching, and drowsiness are side effects of this type of medication. You should take an over-the-counter stool softener (Colace and/or Senna) while taking narcotics to prevent constipation. If you experience itching, over the counter Benadryl may be helpful. Narcotic pain medications often produce drowsiness and it is against the law to operate a vehicle  while taking these medications. If you are taking oxycodone, you should take acetaminophen (Tylenol) around the clock to decrease baseline pain. Do not take Tylenol-containing products while on Percocet or [place].   Refill Policy: For concerns over your safety due to the rising opioid addiction epidemic in the [place], refills of your narcotic pain medications will only be provided on a case by case basis. Please use these medications judiciously.    Anti-inflammatory (NSAID) medicine (Naproxen or Mobic): These are both anti-inflammatory and pain relief. Do NOT take this medication if you have had an ulcer in the past unless you have cleared this with your primary care doctor. You should take NSAIDs with food or antacid to reduce the chance of upset stomach. Depending on your surgery, Dr. Anderson may instruct you to avoid these medications.    Anti-nausea medicine (ondansetron/Zofran): sometimes patients experience nausea related to either anesthesia or the narcotic pain medication. If this is the case you will find this medication helpful.    DVT prophylaxis (Aspirin or Eliquis): For most patients, activity alone is sufficient to prevent dangerous blood clots, but in some cases your personal risk profile and/or the type of surgery you have undergone makes it necessary that you take medication to help prevent blood clots. Dr. Anderson will inform you if you are to start one of these medications postoperatively.    Stool softener (Colace and/or Senna): are available over the counter at your local pharmacy and should be taken while you are taking narcotic pain medication to avoid constipation. You should stop taking these medications if you develop diarrhea. Over the counter laxatives may be used if you develop painful constipation.     Diet   Start with clear liquids (water, juice, Gatorade) and light foods (jello, soup, crackers). Progress to normal diet as tolerated if you are not nauseated. Avoid greasy or spicy  foods for the first 24hrs to avoid GI upset. Increase fluid intake to help prevent constipation.    Dressings / Wound Care  You may remove the outer dressing after 3 days and then can shower. (If you have a splint, please leave the splint in place until follow-up.) Do not remove Steri-strips (white stickers) if present over your incisions. Steri-strips may fall off on their own, which is normal. After the bandage has been removed, you may leave the incisions open to air. Alternatively, if you prefer to keep them covered, you may do so with Band-Aids, a light gauze dressing, or a clean ACE wrap. You may shower after the bandage has been removed (3 days), but it is very important that you pat the wounds dry after the shower. It is OK to allow soap and water to run over the wound - DO NOT soak or scrub the wound. Outside of the shower, keep your incision clean and dry until your first postoperative visit, approximately 10-14 days after surgery. You may remove your sling or brace to shower, unless otherwise instructed. As your balance may be affected by recent surgery, we recommend placing a plastic chair in the shower to help prevent falls. Do NOT take baths, go into a pool, or soak the operative site until approved by Dr. Anderson.    Bracing / Physical Therapy   If you were given one, make sure you wear sling or brace at all times until your follow-up with Dr. Anderson. Only remove your sling or brace for physical therapy, home exercises, and hygiene. These are typically used for 4-6 weeks after surgery in order to protect the healing of the tissue.     Physical therapy is just as important to your recovery as the actual operation! If you were given a prescription for physical therapy, make sure you go to your appointments and do your exercises daily at home (especially motion exercises).     Ice is a very important part of your recovery. It helps reduce inflammation and improves pain control. You should ice a few times  each day for 20-30 minutes at a time. Please make sure there is something under the ice (clean towel, cloth, T-shirt) so that the ice doesn't directly contact your skin. If you ordered a commercially available ice machine (optional) and a compression setting is available, you should use LOW or no compression during the first 5 days. After that, you may increase compression setting as tolerated. If the compression is bothering you then do not use compression.    Driving / Travel  Ultimately, it is your judgment to decide when you are safe to drive, but if you are at all unsure, do not risk your life or someone else's. As a general guideline, you will not be able to drive for 4-6 weeks after surgery. You should certainly not drive while on narcotics pain medication or while in a brace or sling.     Avoid flights and long distance traveling for 6 weeks after surgery. It is important to discuss your travel plans with Dr. Anderson, as additional medications may need to be prescribed to help prevent blood clots if certain travel is unavoidable.     Return to Work or School   Your return to work will depend on what surgery was done and what type of work you do. Please note that these are general guidelines, and there may be modifications based on your unique situation. Typically, you may return to sedentary work or school 3-7 days after surgery if pain is tolerable and you are no longer requiring narcotic pain medication. In conjunction with your input, Dr. Anderson will determine when you may return to more physically rigorous demands.     If you had Knee or Hip Surgery   If your surgery involves a ligament reconstruction or tendon repair, you will typically be prescribed crutches for at least the first few days until pain and the postoperative protocol allows you to fully bear weight and also wear a brace for 4-6 weeks. If cartilage surgery or meniscus repair is performed, you may be on crutches for 6 weeks. Individual  rehabilitation guidelines will vary based upon the unique situation and surgery of every patient, but take these general guidelines into account when planning return to work.     If you had Shoulder Surgery   If your surgery involves a repair (rotator cuff repair, labral repair), you will have a sling on for six weeks after surgery. If you have a sling, you will need to wear it all day. Please wear the sling at all times except for bathing for the first 2 weeks minimum. As long as you can abide by the restrictions, you can return to work when you feel like you can do so safely. However, you will need to take into consideration driving and activities related to your job. You may be able to safely loosen it if you are able to keep your arm supported. Please understand that you will NOT be able to work with your arm away from your body, above shoulder level, or use your arm against gravity for approximately 8 weeks. For jobs that require physical labor, you may require four months or more to return to work. If your surgery does NOT involve a repair (subacromial decompression, distal clavicle excision, capsular release), then you will be in a sling for only a few days after surgery. When comfortable, you may return to work when ready to conduct normal activities of your job. Remember that you may be on narcotic pain medications and these should be discontinued prior to your return to work. For jobs that require physical labor, you may require 6 weeks or more to return to work.     If you had Elbow or Wrist Surgery   If your surgery involves a repair or reconstruction, you will have a splint and sling on followed by a brace for four to six weeks after surgery. As long as you can abide by the restrictions, you can return to work when you feel like you can do so safely. However, you will need to take into consideration driving and activities related to your job. If you have a sling, you will need to wear it all day unless  otherwise instructed. You may be able to safely loosen it if you are able to keep your arm supported. For jobs that require physical labor, you may require four months or more to return to work.    If you had Ankle Surgery   If your surgery involves a repair or reconstruction, you will have a splint followed by a walking boot for four to six weeks after surgery. As long as you can abide by the restrictions, you can return to work when you feel like you can do so safely. However, you will need to take into consideration driving and activities related to your job. For jobs that require physical labor, you may require four months or more to return to work.    Normal Sensations and Findings after Surgery:   PAIN: We do everything possible to make your pain/discomfort level tolerable, but some amount of pain is to be expected.   WARMTH: Mild warmth around the operative site is normal for up to 3 weeks.   REDNESS: Small amount of redness where the sutures enter the skin is normal. If redness worsens or spreads it is important that you contact the office.   DRAINAGE: A small amount is normal for the first 48-72 hours. If wounds continue to drain after this time (requiring multiple gauze changes per day), please contact the office.   NUMBNESS: Around the incision is common.   BRUISING: Is common and often tracks down the arm or leg due to gravity and results in an alarming appearance, but is common and will resolve with time.   FEVER: Low-grade fevers (less than 101.5°F) are common during the first week after surgery. You should drink plenty of fluids and breathe deeply.   CONSTIPATION: Post-operative pain medications as well as immobility can lead to constipation. Please consider taking an over the counter stool softener such as Colace and/or Senna if you experience constipation or if you have a history of constipation.    Follow-Up   A Follow-up appointment should be arranged for 10-14 days after surgery. If one has not  been provided, please call the office to schedule.       NOTIFY US IMMEDIATELY FOR ANY OF THE FOLLOWING:   Most orthopedic surgical procedures are uneventful. However, complications can occur. The following are things to be aware of in the immediate postoperative period.   FEVER - Temperature rises above 101.5°F or associated chills/sweats   WOUND - If you notice drainage more than 4 days after surgery, if the drainage turns yellows and foul smelling, if you need to change gauze multiple times per day, or if sutures become loose.   CARDIOVASCULAR - Chest pain, shortness of breath, palpitations, or fainting spells must be taken seriously. Go to the emergency room (or call 911) immediately for evaluation.   BLOOD CLOTS - Orthopaedic surgery patients are at risk for blood clots. While the risk is higher for lower extremity surgery, even those who have undergone upper extremity surgery are at an increased risk. Please notify Dr. Anderson if you or someone in your family has had blood clots or any type of known clotting disorder. Signs of blood clots may include calf pain or cramping, diffuse swelling in the leg and foot, or chest pain and shortness of breath. Please call the office or go to the hospital if you recognize any of these symptoms.   NAUSEA - If you have severe vomiting, diarrhea, or constipation, or cannot keep any liquid down   URINARY RETENTION - If you cannot urinate the night after surgery, please go to the Emergency Room.

## 2025-05-27 ENCOUNTER — HOSPITAL ENCOUNTER (OUTPATIENT)
Facility: HOSPITAL | Age: 70
Setting detail: OUTPATIENT SURGERY
Discharge: HOME | End: 2025-05-27
Attending: STUDENT IN AN ORGANIZED HEALTH CARE EDUCATION/TRAINING PROGRAM | Admitting: STUDENT IN AN ORGANIZED HEALTH CARE EDUCATION/TRAINING PROGRAM
Payer: MEDICARE

## 2025-05-27 ENCOUNTER — ANESTHESIA (OUTPATIENT)
Dept: OPERATING ROOM | Facility: HOSPITAL | Age: 70
End: 2025-05-27
Payer: MEDICARE

## 2025-05-27 ENCOUNTER — ANESTHESIA EVENT (OUTPATIENT)
Dept: OPERATING ROOM | Facility: HOSPITAL | Age: 70
End: 2025-05-27
Payer: MEDICARE

## 2025-05-27 VITALS
TEMPERATURE: 97.2 F | SYSTOLIC BLOOD PRESSURE: 144 MMHG | HEIGHT: 70 IN | WEIGHT: 196.21 LBS | HEART RATE: 56 BPM | DIASTOLIC BLOOD PRESSURE: 82 MMHG | BODY MASS INDEX: 28.09 KG/M2 | RESPIRATION RATE: 14 BRPM | OXYGEN SATURATION: 95 %

## 2025-05-27 DIAGNOSIS — M65.961 SYNOVITIS OF RIGHT KNEE: ICD-10-CM

## 2025-05-27 DIAGNOSIS — S83.241A ACUTE MEDIAL MENISCUS TEAR, RIGHT, INITIAL ENCOUNTER: ICD-10-CM

## 2025-05-27 DIAGNOSIS — S83.241D ACUTE MEDIAL MENISCUS TEAR, RIGHT, SUBSEQUENT ENCOUNTER: ICD-10-CM

## 2025-05-27 DIAGNOSIS — M94.261 CHONDROMALACIA, RIGHT KNEE: Primary | ICD-10-CM

## 2025-05-27 LAB
GLUCOSE BLD MANUAL STRIP-MCNC: 144 MG/DL (ref 74–99)
GLUCOSE BLD MANUAL STRIP-MCNC: 147 MG/DL (ref 74–99)

## 2025-05-27 PROCEDURE — 7100000009 HC PHASE TWO TIME - INITIAL BASE CHARGE: Performed by: STUDENT IN AN ORGANIZED HEALTH CARE EDUCATION/TRAINING PROGRAM

## 2025-05-27 PROCEDURE — 3700000001 HC GENERAL ANESTHESIA TIME - INITIAL BASE CHARGE: Performed by: STUDENT IN AN ORGANIZED HEALTH CARE EDUCATION/TRAINING PROGRAM

## 2025-05-27 PROCEDURE — 3600000004 HC OR TIME - INITIAL BASE CHARGE - PROCEDURE LEVEL FOUR: Performed by: STUDENT IN AN ORGANIZED HEALTH CARE EDUCATION/TRAINING PROGRAM

## 2025-05-27 PROCEDURE — 2500000004 HC RX 250 GENERAL PHARMACY W/ HCPCS (ALT 636 FOR OP/ED): Mod: JZ

## 2025-05-27 PROCEDURE — 3600000009 HC OR TIME - EACH INCREMENTAL 1 MINUTE - PROCEDURE LEVEL FOUR: Performed by: STUDENT IN AN ORGANIZED HEALTH CARE EDUCATION/TRAINING PROGRAM

## 2025-05-27 PROCEDURE — A29881 PR KNEE SCOPE,MED/LAT MENISECTOMY: Performed by: STUDENT IN AN ORGANIZED HEALTH CARE EDUCATION/TRAINING PROGRAM

## 2025-05-27 PROCEDURE — 2500000004 HC RX 250 GENERAL PHARMACY W/ HCPCS (ALT 636 FOR OP/ED): Performed by: STUDENT IN AN ORGANIZED HEALTH CARE EDUCATION/TRAINING PROGRAM

## 2025-05-27 PROCEDURE — 7100000002 HC RECOVERY ROOM TIME - EACH INCREMENTAL 1 MINUTE: Performed by: STUDENT IN AN ORGANIZED HEALTH CARE EDUCATION/TRAINING PROGRAM

## 2025-05-27 PROCEDURE — 2500000004 HC RX 250 GENERAL PHARMACY W/ HCPCS (ALT 636 FOR OP/ED)

## 2025-05-27 PROCEDURE — 2720000007 HC OR 272 NO HCPCS: Performed by: STUDENT IN AN ORGANIZED HEALTH CARE EDUCATION/TRAINING PROGRAM

## 2025-05-27 PROCEDURE — 7100000010 HC PHASE TWO TIME - EACH INCREMENTAL 1 MINUTE: Performed by: STUDENT IN AN ORGANIZED HEALTH CARE EDUCATION/TRAINING PROGRAM

## 2025-05-27 PROCEDURE — 2500000004 HC RX 250 GENERAL PHARMACY W/ HCPCS (ALT 636 FOR OP/ED): Mod: JZ | Performed by: STUDENT IN AN ORGANIZED HEALTH CARE EDUCATION/TRAINING PROGRAM

## 2025-05-27 PROCEDURE — 2500000001 HC RX 250 WO HCPCS SELF ADMINISTERED DRUGS (ALT 637 FOR MEDICARE OP)

## 2025-05-27 PROCEDURE — A29881 PR KNEE SCOPE,MED/LAT MENISECTOMY: Performed by: INTERNAL MEDICINE

## 2025-05-27 PROCEDURE — 3700000002 HC GENERAL ANESTHESIA TIME - EACH INCREMENTAL 1 MINUTE: Performed by: STUDENT IN AN ORGANIZED HEALTH CARE EDUCATION/TRAINING PROGRAM

## 2025-05-27 PROCEDURE — 2500000004 HC RX 250 GENERAL PHARMACY W/ HCPCS (ALT 636 FOR OP/ED): Mod: JZ | Performed by: INTERNAL MEDICINE

## 2025-05-27 PROCEDURE — A4550 SURGICAL TRAYS: HCPCS | Performed by: STUDENT IN AN ORGANIZED HEALTH CARE EDUCATION/TRAINING PROGRAM

## 2025-05-27 PROCEDURE — 2500000005 HC RX 250 GENERAL PHARMACY W/O HCPCS

## 2025-05-27 PROCEDURE — 82947 ASSAY GLUCOSE BLOOD QUANT: CPT

## 2025-05-27 PROCEDURE — 7100000001 HC RECOVERY ROOM TIME - INITIAL BASE CHARGE: Performed by: STUDENT IN AN ORGANIZED HEALTH CARE EDUCATION/TRAINING PROGRAM

## 2025-05-27 RX ORDER — ONDANSETRON HYDROCHLORIDE 2 MG/ML
4 INJECTION, SOLUTION INTRAVENOUS ONCE AS NEEDED
Status: DISCONTINUED | OUTPATIENT
Start: 2025-05-27 | End: 2025-05-27 | Stop reason: HOSPADM

## 2025-05-27 RX ORDER — LABETALOL HYDROCHLORIDE 5 MG/ML
5 INJECTION, SOLUTION INTRAVENOUS ONCE AS NEEDED
Status: DISCONTINUED | OUTPATIENT
Start: 2025-05-27 | End: 2025-05-27 | Stop reason: HOSPADM

## 2025-05-27 RX ORDER — ONDANSETRON 4 MG/1
4 TABLET, FILM COATED ORAL EVERY 8 HOURS PRN
Qty: 20 TABLET | Refills: 0 | Status: SHIPPED | OUTPATIENT
Start: 2025-05-27 | End: 2025-06-03

## 2025-05-27 RX ORDER — FENTANYL CITRATE 50 UG/ML
25 INJECTION, SOLUTION INTRAMUSCULAR; INTRAVENOUS EVERY 5 MIN PRN
Status: DISCONTINUED | OUTPATIENT
Start: 2025-05-27 | End: 2025-05-27 | Stop reason: HOSPADM

## 2025-05-27 RX ORDER — DIPHENHYDRAMINE HYDROCHLORIDE 50 MG/ML
12.5 INJECTION, SOLUTION INTRAMUSCULAR; INTRAVENOUS ONCE AS NEEDED
Status: DISCONTINUED | OUTPATIENT
Start: 2025-05-27 | End: 2025-05-27 | Stop reason: HOSPADM

## 2025-05-27 RX ORDER — FENTANYL CITRATE 50 UG/ML
50 INJECTION, SOLUTION INTRAMUSCULAR; INTRAVENOUS EVERY 5 MIN PRN
Status: DISCONTINUED | OUTPATIENT
Start: 2025-05-27 | End: 2025-05-27 | Stop reason: HOSPADM

## 2025-05-27 RX ORDER — ASPIRIN 81 MG/1
81 TABLET ORAL 2 TIMES DAILY
Qty: 30 TABLET | Refills: 0 | Status: SHIPPED | OUTPATIENT
Start: 2025-05-27 | End: 2025-06-11

## 2025-05-27 RX ORDER — SODIUM CHLORIDE, SODIUM LACTATE, POTASSIUM CHLORIDE, CALCIUM CHLORIDE 600; 310; 30; 20 MG/100ML; MG/100ML; MG/100ML; MG/100ML
50 INJECTION, SOLUTION INTRAVENOUS CONTINUOUS
Status: DISCONTINUED | OUTPATIENT
Start: 2025-05-27 | End: 2025-05-27 | Stop reason: HOSPADM

## 2025-05-27 RX ORDER — ACETAMINOPHEN 500 MG
1000 TABLET ORAL EVERY 8 HOURS PRN
Qty: 60 TABLET | Refills: 1 | Status: SHIPPED | OUTPATIENT
Start: 2025-05-27 | End: 2025-06-16

## 2025-05-27 RX ORDER — OXYCODONE HYDROCHLORIDE 5 MG/1
5 TABLET ORAL ONCE AS NEEDED
Status: DISCONTINUED | OUTPATIENT
Start: 2025-05-27 | End: 2025-05-27 | Stop reason: HOSPADM

## 2025-05-27 RX ORDER — FENTANYL CITRATE 50 UG/ML
INJECTION, SOLUTION INTRAMUSCULAR; INTRAVENOUS AS NEEDED
Status: DISCONTINUED | OUTPATIENT
Start: 2025-05-27 | End: 2025-05-27

## 2025-05-27 RX ORDER — MEPERIDINE HYDROCHLORIDE 25 MG/ML
12.5 INJECTION INTRAMUSCULAR; INTRAVENOUS; SUBCUTANEOUS EVERY 10 MIN PRN
Status: DISCONTINUED | OUTPATIENT
Start: 2025-05-27 | End: 2025-05-27 | Stop reason: HOSPADM

## 2025-05-27 RX ORDER — ACETAMINOPHEN 325 MG/1
975 TABLET ORAL ONCE
Status: COMPLETED | OUTPATIENT
Start: 2025-05-27 | End: 2025-05-27

## 2025-05-27 RX ORDER — HYDRALAZINE HYDROCHLORIDE 20 MG/ML
5 INJECTION INTRAMUSCULAR; INTRAVENOUS EVERY 30 MIN PRN
Status: DISCONTINUED | OUTPATIENT
Start: 2025-05-27 | End: 2025-05-27 | Stop reason: HOSPADM

## 2025-05-27 RX ORDER — ALBUTEROL SULFATE 0.83 MG/ML
2.5 SOLUTION RESPIRATORY (INHALATION) ONCE AS NEEDED
Status: DISCONTINUED | OUTPATIENT
Start: 2025-05-27 | End: 2025-05-27 | Stop reason: HOSPADM

## 2025-05-27 RX ORDER — OXYCODONE HYDROCHLORIDE 5 MG/1
5 TABLET ORAL EVERY 4 HOURS PRN
Qty: 15 TABLET | Refills: 0 | Status: SHIPPED | OUTPATIENT
Start: 2025-05-27 | End: 2025-05-30

## 2025-05-27 RX ORDER — CEFAZOLIN SODIUM 2 G/100ML
2 INJECTION, SOLUTION INTRAVENOUS ONCE
Status: COMPLETED | OUTPATIENT
Start: 2025-05-27 | End: 2025-05-27

## 2025-05-27 RX ORDER — IPRATROPIUM BROMIDE 0.5 MG/2.5ML
500 SOLUTION RESPIRATORY (INHALATION) AS NEEDED
Status: DISCONTINUED | OUTPATIENT
Start: 2025-05-27 | End: 2025-05-27 | Stop reason: HOSPADM

## 2025-05-27 RX ORDER — GABAPENTIN 300 MG/1
300 CAPSULE ORAL ONCE
Status: COMPLETED | OUTPATIENT
Start: 2025-05-27 | End: 2025-05-27

## 2025-05-27 RX ORDER — CELECOXIB 200 MG/1
200 CAPSULE ORAL ONCE
Status: COMPLETED | OUTPATIENT
Start: 2025-05-27 | End: 2025-05-27

## 2025-05-27 RX ORDER — SODIUM CHLORIDE, SODIUM LACTATE, POTASSIUM CHLORIDE, CALCIUM CHLORIDE 600; 310; 30; 20 MG/100ML; MG/100ML; MG/100ML; MG/100ML
100 INJECTION, SOLUTION INTRAVENOUS CONTINUOUS
Status: DISCONTINUED | OUTPATIENT
Start: 2025-05-27 | End: 2025-05-27 | Stop reason: HOSPADM

## 2025-05-27 RX ORDER — PROCHLORPERAZINE EDISYLATE 5 MG/ML
5 INJECTION INTRAMUSCULAR; INTRAVENOUS ONCE AS NEEDED
Status: DISCONTINUED | OUTPATIENT
Start: 2025-05-27 | End: 2025-05-27 | Stop reason: HOSPADM

## 2025-05-27 RX ORDER — PROPOFOL 10 MG/ML
INJECTION, EMULSION INTRAVENOUS AS NEEDED
Status: DISCONTINUED | OUTPATIENT
Start: 2025-05-27 | End: 2025-05-27

## 2025-05-27 RX ORDER — ONDANSETRON HYDROCHLORIDE 2 MG/ML
INJECTION, SOLUTION INTRAVENOUS AS NEEDED
Status: DISCONTINUED | OUTPATIENT
Start: 2025-05-27 | End: 2025-05-27

## 2025-05-27 RX ORDER — BUPIVACAINE HYDROCHLORIDE 5 MG/ML
INJECTION, SOLUTION PERINEURAL AS NEEDED
Status: DISCONTINUED | OUTPATIENT
Start: 2025-05-27 | End: 2025-05-27 | Stop reason: HOSPADM

## 2025-05-27 RX ADMIN — PROPOFOL 200 MG: 10 INJECTION, EMULSION INTRAVENOUS at 11:35

## 2025-05-27 RX ADMIN — POVIDONE-IODINE 1 APPLICATION: 5 SOLUTION TOPICAL at 10:31

## 2025-05-27 RX ADMIN — SODIUM CHLORIDE, SODIUM LACTATE, POTASSIUM CHLORIDE, AND CALCIUM CHLORIDE 50 ML/HR: 600; 310; 30; 20 INJECTION, SOLUTION INTRAVENOUS at 10:36

## 2025-05-27 RX ADMIN — PROPOFOL 300 MG: 10 INJECTION, EMULSION INTRAVENOUS at 11:40

## 2025-05-27 RX ADMIN — ONDANSETRON 4 MG: 2 INJECTION, SOLUTION INTRAMUSCULAR; INTRAVENOUS at 11:41

## 2025-05-27 RX ADMIN — SODIUM CHLORIDE, POTASSIUM CHLORIDE, SODIUM LACTATE AND CALCIUM CHLORIDE: 600; 310; 30; 20 INJECTION, SOLUTION INTRAVENOUS at 11:29

## 2025-05-27 RX ADMIN — FENTANYL CITRATE 50 MCG: 50 INJECTION INTRAMUSCULAR; INTRAVENOUS at 12:50

## 2025-05-27 RX ADMIN — CELECOXIB 200 MG: 200 CAPSULE ORAL at 10:31

## 2025-05-27 RX ADMIN — DEXAMETHASONE SODIUM PHOSPHATE 4 MG: 4 INJECTION, SOLUTION INTRAMUSCULAR; INTRAVENOUS at 11:41

## 2025-05-27 RX ADMIN — ACETAMINOPHEN 975 MG: 325 TABLET ORAL at 10:31

## 2025-05-27 RX ADMIN — CEFAZOLIN SODIUM 2 G: 2 INJECTION, SOLUTION INTRAVENOUS at 11:41

## 2025-05-27 RX ADMIN — GABAPENTIN 300 MG: 300 CAPSULE ORAL at 10:31

## 2025-05-27 RX ADMIN — FENTANYL CITRATE 50 MCG: 50 INJECTION, SOLUTION INTRAMUSCULAR; INTRAVENOUS at 11:41

## 2025-05-27 RX ADMIN — FENTANYL CITRATE 50 MCG: 50 INJECTION, SOLUTION INTRAMUSCULAR; INTRAVENOUS at 11:59

## 2025-05-27 ASSESSMENT — PAIN SCALES - GENERAL
PAINLEVEL_OUTOF10: 2
PAINLEVEL_OUTOF10: 0 - NO PAIN
PAINLEVEL_OUTOF10: 3
PAINLEVEL_OUTOF10: 0 - NO PAIN
PAINLEVEL_OUTOF10: 7
PAINLEVEL_OUTOF10: 0 - NO PAIN
PAINLEVEL_OUTOF10: 0 - NO PAIN
PAINLEVEL_OUTOF10: 3
PAINLEVEL_OUTOF10: 4
PAINLEVEL_OUTOF10: 5 - MODERATE PAIN
PAINLEVEL_OUTOF10: 0 - NO PAIN

## 2025-05-27 ASSESSMENT — PAIN SCALES - WONG BAKER
WONGBAKER_NUMERICALRESPONSE: NO HURT
WONGBAKER_NUMERICALRESPONSE: HURTS LITTLE MORE
WONGBAKER_NUMERICALRESPONSE: NO HURT
WONGBAKER_NUMERICALRESPONSE: HURTS LITTLE MORE
WONGBAKER_NUMERICALRESPONSE: NO HURT
WONGBAKER_NUMERICALRESPONSE: HURTS LITTLE MORE

## 2025-05-27 ASSESSMENT — PAIN DESCRIPTION - DESCRIPTORS
DESCRIPTORS: ACHING;DULL
DESCRIPTORS: DULL;ACHING
DESCRIPTORS: ACHING;DULL
DESCRIPTORS: ACHING
DESCRIPTORS: ACHING;DULL;SHARP
DESCRIPTORS: THROBBING

## 2025-05-27 ASSESSMENT — PAIN - FUNCTIONAL ASSESSMENT
PAIN_FUNCTIONAL_ASSESSMENT: 0-10
PAIN_FUNCTIONAL_ASSESSMENT: WONG-BAKER FACES
PAIN_FUNCTIONAL_ASSESSMENT: 0-10
PAIN_FUNCTIONAL_ASSESSMENT: WONG-BAKER FACES
PAIN_FUNCTIONAL_ASSESSMENT: 0-10
PAIN_FUNCTIONAL_ASSESSMENT: WONG-BAKER FACES
PAIN_FUNCTIONAL_ASSESSMENT: 0-10
PAIN_FUNCTIONAL_ASSESSMENT: 0-10
PAIN_FUNCTIONAL_ASSESSMENT: WONG-BAKER FACES
PAIN_FUNCTIONAL_ASSESSMENT: WONG-BAKER FACES
PAIN_FUNCTIONAL_ASSESSMENT: 0-10
PAIN_FUNCTIONAL_ASSESSMENT: 0-10
PAIN_FUNCTIONAL_ASSESSMENT: WONG-BAKER FACES
PAIN_FUNCTIONAL_ASSESSMENT: 0-10

## 2025-05-27 NOTE — ANESTHESIA POSTPROCEDURE EVALUATION
Patient: Raphael Crain    Procedure Summary       Date: 05/27/25 Room / Location: [facility] OR 05 / [facility]    Anesthesia Start: 1131 Anesthesia Stop: 1219    Procedure: Right knee arthroscopy, partial medial meniscectomy, intra-articular debridement and synovectomy, chondroplasty (Right: Knee) Diagnosis:       Acute medial meniscus tear, right, initial encounter      Synovitis of right knee      Chondromalacia, right knee      (Acute medial meniscus tear, right, initial encounter [S83.241A])      (Synovitis of right knee [M65.961])      (Chondromalacia, right knee [M94.261])    Surgeons: Suhail Anderson MD Responsible Provider: Gurpreet Sanchez MD    Anesthesia Type: general ASA Status: 2            Anesthesia Type: general    Vitals Value Taken Time   /85 05/27/25 13:15   Temp 36.4 °C (97.5 °F) 05/27/25 13:15   Pulse 55 05/27/25 13:15   Resp 14 05/27/25 13:15   SpO2 96 % 05/27/25 13:15       Anesthesia Post Evaluation    Patient location during evaluation: PACU  Patient participation: complete - patient participated  Level of consciousness: awake  Pain management: adequate  Multimodal analgesia pain management approach  Airway patency: patent  Cardiovascular status: acceptable and hemodynamically stable  Respiratory status: acceptable and spontaneous ventilation  Hydration status: euvolemic  Postoperative Nausea and Vomiting: none        There were no known notable events for this encounter.

## 2025-05-27 NOTE — PERIOPERATIVE NURSING NOTE
1216 Arrives to pacu deeply sedated with oral airway and no oxygen. Breathing shallow sfm 6 liters applied to patient stimulated to take deep breaths unable to comply. Lungs clear as able to auscultate abd soft non distended no active bleeding noted. Dressing to right knee dry and intact less than 3 minute return with capillary refill,. Pulses palpable.. Toe nails thick. Pulses strong. Continues to snore. Head of bed up moderate rise nad fall of chest with respirations.    1224 Denies pain and nausea falls back to sleep without stimulation. Sfm continued.  1235 Snoring unless stimulated Denies pain and nausea while awake vitals stable.  1250 Complaining of intense throbbing medicated 7/10 .  1300 Report to KW, Pacu Accucheck 147 throbbing improved to 4/10 tolerable.

## 2025-05-27 NOTE — ANESTHESIA PREPROCEDURE EVALUATION
Patient: Raphael Crain    Procedure Information       Date/Time: 05/27/25 1025    Procedure: Right knee arthroscopy, partial medial meniscectomy, intra-articular debridement and synovectomy, chondroplasty (Right: Knee)    Location: [facility] 05 / [facility]    Surgeons: Suhail Anderson MD            Relevant Problems   Anesthesia (within normal limits)      Cardiac   (+) Benign essential hypertension   (+) Hyperlipidemia   (-) History of coronary artery bypass graft   (-) Pacemaker      Pulmonary   (-) Asthma   (-) Chronic obstructive pulmonary disease (Multi)   (-) RULA (obstructive sleep apnea)      Neuro   (-) CVA (cerebral vascular accident) (Multi)   (-) Seizures (Multi)      GI   (+) Gastroesophageal reflux disease without esophagitis   (+) Irritable bowel      Endocrine   (+) Hypothyroidism   (+) Type 2 diabetes mellitus      Hematology   (+) Thrombocytopenia   (-) Coagulopathy (Multi)      Musculoskeletal   (+) Primary osteoarthritis of both knees       Clinical information reviewed:   Tobacco  Allergies  Meds   Med Hx  Surg Hx   Fam Hx  Soc Hx        NPO Detail:  NPO/Void Status  Date of Last Liquid: 05/27/25  Time of Last Liquid: 0630 (sip of water wih meds)  Date of Last Solid: 05/26/25  Time of Last Solid: 2000  Last Intake Type: Light meal  Time of Last Void: 0700         Physical Exam    Airway  Mallampati: I  TM distance: >3 FB  Neck ROM: full     Cardiovascular    Dental    Pulmonary    Abdominal            Anesthesia Plan    History of general anesthesia?: yes  History of complications of general anesthesia?: no    ASA 2     general     intravenous induction   Postoperative pain plan includes opioids.  Anesthetic plan and risks discussed with patient.  Use of blood products discussed with who consented to blood products.

## 2025-05-27 NOTE — ANESTHESIA PROCEDURE NOTES
Airway  Date/Time: 5/27/2025 11:38 AM  Reason: elective    Airway not difficult    Staffing  Performed: CRNA   Authorized by: Gurpreet Sanchez MD    Performed by: JANIYA Roe-CNP, APRN-CRNA  Patient location during procedure: OR    Patient Condition  Indications for airway management: anesthesia  Patient position: sniffing  Sedation level: deep     Final Airway Details   Preoxygenated: yes  Final airway type: supraglottic airway  Successful airway: air-Q  Size: 4  Number of attempts at approach: 1

## 2025-05-27 NOTE — BRIEF OP NOTE
Date: 2025  OR Location: [facility]    Name: Raphael Crain, : 1955, Age: 69 y.o., MRN: [id number], Sex: male    Diagnosis  Pre-op Diagnosis      * Acute medial meniscus tear, right, initial encounter [S83.241A]     * Synovitis of right knee [M65.961]     * Chondromalacia, right knee [M94.261] Post-op Diagnosis     * Acute medial meniscus tear, right, initial encounter [S83.241A]     * Synovitis of right knee [M65.961]     * Chondromalacia, right knee [M94.261]     Procedures  1.  Right knee arthroscopy, partial medial meniscectomy  2.  Right knee arthroscopic chondroplasty patellofemoral joint  3.  Right knee arthroscopic extensive intra-articular debridement and synovectomy    Surgeons      * Suhail Anderson - Primary    Resident/Fellow/Other Assistant:  Surgeons and Role:     * Gabrielle Lopresti, PA-C - [employer]    Staff:   Ramónulator: Bessie Santos Person: Christine Santos Person: Karla    Anesthesia Staff: Anesthesiologist: Gurpreet Sanchez MD  CRNA: Abdullahi Ha, APRN-CNP, APRN-CRNA    Procedure Summary  Anesthesia: General  ASA: II  Estimated Blood Loss: 5mL  Intra-op Medications:   Administrations occurring from 1025 to 1215 on 25:   Medication Name Total Dose   BUPivacaine HCl (Marcaine) 0.5 % (5 mg/mL) injection 10 mL   dexAMETHasone (Decadron) 4 mg/mL IV Syringe 2 mL 4 mg   fentaNYL (Sublimaze) injection 50 mcg/mL 100 mcg   LR bolus Cannot be calculated   lactated Ringer's infusion 82.5 mL   ondansetron (Zofran) 2 mg/mL injection 4 mg   propofol (Diprivan) injection 10 mg/mL 500 mg   acetaminophen (Tylenol) tablet 975 mg 975 mg   ceFAZolin (Ancef) 2 g in dextrose (iso)  mL 2 g   celecoxib (CeleBREX) capsule 200 mg 200 mg   gabapentin (Neurontin) capsule 300 mg 300 mg   povidone-iodine 5 % kit kit 1 Application          Anesthesia Record               Intraprocedure I/O Totals          Intake    LR bolus 750.00 mL    Total Intake 750 mL          Specimen: No specimens collected      Findings: complex tear within body of medial meniscus with a loose displaced meniscal fragment, chondromalacia patellofemoral joint, tricompartmental synovitis    Complications:  None; patient tolerated the procedure well.     Disposition: PACU - hemodynamically stable.  Condition: stable  Specimens Collected: No specimens collected  Attending Attestation: I was present and scrubbed for the entire procedure.    Suhail Anderson  Phone Number: [phone]

## 2025-05-27 NOTE — OP NOTE
Right knee arthroscopy, partial medial meniscectomy, intra-articular debridement and synovectomy, chondroplasty (R) Operative Note     Date: 2025  OR Location: [facility]    Name: Raphael Crain, : 1955, Age: 69 y.o., MRN: [id number], Sex: male    Diagnosis  Pre-op Diagnosis      * Acute medial meniscus tear, right, initial encounter [S83.241A]     * Synovitis of right knee [M65.961]     * Chondromalacia, right knee [M94.261] Post-op Diagnosis     * Acute medial meniscus tear, right, initial encounter [S83.241A]     * Synovitis of right knee [M65.961]     * Chondromalacia, right knee [M94.261]     Procedures  1.  Right knee arthroscopy, partial medial meniscectomy  2.  Right knee arthroscopic chondroplasty patellofemoral joint  3.  Right knee arthroscopic extensive intra-articular debridement and synovectomy    Surgeons      * Suhail Anderson - Primary    Resident/Fellow/Other Assistant:  Surgeons and Role:     * Gabrielle Lopresti, PA-C - [employer] Assist    Staff:   Ramónulator: Bessie  Scrub Person: Christine Thomsonub Person: Karla    Anesthesia Staff: Anesthesiologist: Gurpreet Sanchez MD  CRNA: JANIYA Roe-CNP, JANIYA-CRNA    Procedure Summary  Anesthesia: General  ASA: II  Estimated Blood Loss: 5mL  Intra-op Medications:   Administrations occurring from 1025 to 1215 on 25:   Medication Name Total Dose   BUPivacaine HCl (Marcaine) 0.5 % (5 mg/mL) injection 10 mL   lactated Ringer's infusion 82.5 mL   acetaminophen (Tylenol) tablet 975 mg 975 mg   ceFAZolin (Ancef) 2 g in dextrose (iso)  mL 2 g   celecoxib (CeleBREX) capsule 200 mg 200 mg   gabapentin (Neurontin) capsule 300 mg 300 mg   povidone-iodine 5 % kit kit 1 Application   dexAMETHasone (Decadron) 4 mg/mL IV Syringe 2 mL 4 mg   fentaNYL (Sublimaze) injection 50 mcg/mL 100 mcg   LR bolus Cannot be calculated   ondansetron (Zofran) 2 mg/mL injection 4 mg   propofol (Diprivan) injection 10 mg/mL 500 mg          Anesthesia Record                Intraprocedure I/O Totals          Intake    LR bolus 750.00 mL    ceFAZolin (Ancef) 2 g in dextrose (iso)  mL 100.00 mL    Total Intake 850 mL          Specimen: No specimens collected      Drains and/or Catheters: * None in log *    Tourniquet Times:     Total Tourniquet Time Documented:  Thigh (Right) - 17 minutes  Total: Thigh (Right) - 17 minutes      Implants: None    Findings: complex tear within body of medial meniscus with a loose displaced meniscal fragment, chondromalacia patellofemoral joint, tricompartmental synovitis     Indications: Raphael Crain is an 69 y.o. male who is having surgery for right knee complex medial meniscus tear, chondromalacia patellofemoral joint and tricompartmental synovitis.  MRI confirmed the above.  He failed nonoperative management.  After long discussion with the patient, he elected to proceed with surgical invention form of a right knee arthroscopy, partial medial meniscectomy, intra-articular debridement and synovectomy, chondroplasty.  I thoroughly explained the risks and benefits as well as the expected postoperative timeline for the proposed procedure versus nonoperative management. Risks of this procedure include but are not limited to bleeding, infection, nerve injury, DVT/PE and failure of repair or implant.  The patient expressed understanding and wished to proceed with surgical intervention.  All questions were answered. They were consented to the above procedure at bedside.    The patient was seen in the preoperative area. The risks, benefits, complications, treatment options, non-operative alternatives, expected recovery and outcomes were discussed with the patient. The possibilities of reaction to medication, pulmonary aspiration, injury to surrounding structures, bleeding, recurrent infection, the need for additional procedures, failure to diagnose a condition, and creating a complication requiring transfusion or operation were discussed  with the patient. The patient concurred with the proposed plan, giving informed consent.  The site of surgery was properly noted/marked if necessary per policy. The patient has been actively warmed in preoperative area. Preoperative antibiotics have been ordered and given within 1 hours of incision. Venous thrombosis prophylaxis have been ordered including unilateral sequential compression device    Procedure Details:   EXAMINATION UNDER ANESTHESIA: Range of motion 0-140; Stable to varus/valgus/anterior/posterior; Negative Lachman; Negative Anterior and Posterior Drawer    ARTHROSCOPIC FINDINGS:   The patellofemoral joint demonstrated chondromalacia of the central patella and chondral wear of the central trochlea with loose chondral flaps which were addressed with a gentle chondroplasty with an arthroscopic shaver. The gutters were clear. The medial compartment demonstrated intact chondral surfaces with some grade 2 changes and the medial meniscus was torn at the body extending to the posterior horn in the form of a complex tear.  This was addressed with a partial medial meniscectomy using combination of arthroscopic biters and rossi back to a stable rim of healthy appearing meniscus tissue. The notch showed an intact PCL and ACL. The lateral compartment demonstrated intact chondral surfaces and the lateral meniscus was intact and stable to probing. There was an extensive amount of scar tissue in the infrapatellar fat pad that was thoroughly debrided. An extensive intra-articular debridement and synovectomy was performed in the suprapatellar pouch, infrapatellar fat pad, medial and lateral gutters, femoral notch, patellofemoral compartment and medial and lateral compartments with a combination of arthroscopic shaver and ablator.    PROCEDURE IN DETAIL:   The patient was identified in the preoperative area. The right knee was marked as the visible operative field. They were then brought to the operating room and  placed supine on the operating room table. SCDs were placed for DVT prophylaxis and antibiotics were given. After smooth induction of general anesthesia, the contralateral lower extremity was placed in a well-padded lithotomy taylor and the operative lower extremity was placed with tourniquet and a leg taylor. The right lower extremity was prepped and draped in usual sterile fashion. A timeout was taken to ensure the correct patient, correct site, correct procedure, as well as that preoperative antibiotics had been given and DVT prophylaxis was in place.      We began by making a standard inferolateral portal. Diagnostic arthroscopy of the patellofemoral joint and gutters was performed as described above.  A gentle chondroplasty was performed on the patella.  The knee was brought into flexion. The rest of the diagnostic arthroscopy was completed as described above.    The patellofemoral joint demonstrated chondromalacia of the central patella and chondral wear of the central trochlea with loose chondral flaps which were addressed with a gentle chondroplasty with an arthroscopic shaver. The gutters were clear. The medial compartment demonstrated intact chondral surfaces with some grade 2 changes and the medial meniscus was torn at the body extending to the posterior horn in the form of a complex tear.  This was addressed with a partial medial meniscectomy using combination of arthroscopic biters and rossi back to a stable rim of healthy appearing meniscus tissue. The notch showed an intact PCL and ACL. The lateral compartment demonstrated intact chondral surfaces and the lateral meniscus was intact and stable to probing. There was an extensive amount of scar tissue in the infrapatellar fat pad that was thoroughly debrided. An extensive intra-articular debridement and synovectomy was performed in the suprapatellar pouch, infrapatellar fat pad, medial and lateral gutters, femoral notch, patellofemoral compartment and  medial and lateral compartments with a combination of arthroscopic shaver and ablator.    Arthroscopic fluid was drained from the knee as the arthroscope was removed.    The wounds were irrigated. The portals were closed with buried interrupted 3-0 monocryl sutures. Steri-strips, a sterile dressing, and Ace wrap were placed. The needle, sponge, and instrument counts were correct at the end of the case. The patient was awoken, taken to PACU in stable condition.     POSTOPERATIVE PLAN: The patient will begin the arthroscopic meniscectomy postop protocol. The patient will start by progressively increasing weightbearing as the postoperative protocol dictates with crutches.  The patient will follow-up with me at their routine 2-week postoperative visit. They will begin PT in the next 1-2 weeks. The patient will be started on aspirin 81 mg twice daily for 2 weeks for DVT prophylaxis.     Gabrielle LoPresti, PA-C was present for the entire case.  Her assistance was necessary and critical to the successful completion of the procedure.  She provided skilled assistance with arthroscope manipulation, knee positioning, wound closure.  A qualified assistant was not available to perform her portion of the case.    Evidence of Infection: No   Complications:  None; patient tolerated the procedure well.    Disposition: PACU - hemodynamically stable.  Condition: stable     Attending Attestation: I performed the procedure.    Suhail Anderson  Phone Number: [phone]

## 2025-05-30 DIAGNOSIS — S83.241A ACUTE MEDIAL MENISCUS TEAR, RIGHT, INITIAL ENCOUNTER: ICD-10-CM

## 2025-06-02 ENCOUNTER — APPOINTMENT (OUTPATIENT)
Dept: PHYSICAL THERAPY | Facility: CLINIC | Age: 70
End: 2025-06-02
Payer: MEDICARE

## 2025-06-02 DIAGNOSIS — S83.241A ACUTE MEDIAL MENISCUS TEAR, RIGHT, INITIAL ENCOUNTER: ICD-10-CM

## 2025-06-02 DIAGNOSIS — S83.241D ACUTE MEDIAL MENISCUS TEAR, RIGHT, SUBSEQUENT ENCOUNTER: ICD-10-CM

## 2025-06-02 DIAGNOSIS — R26.2 DIFFICULTY WALKING: Primary | ICD-10-CM

## 2025-06-02 DIAGNOSIS — M25.561 ACUTE PAIN OF RIGHT KNEE: ICD-10-CM

## 2025-06-02 PROCEDURE — 97110 THERAPEUTIC EXERCISES: CPT | Mod: GP | Performed by: PHYSICAL THERAPIST

## 2025-06-02 PROCEDURE — 97161 PT EVAL LOW COMPLEX 20 MIN: CPT | Mod: GP | Performed by: PHYSICAL THERAPIST

## 2025-06-02 ASSESSMENT — PAIN - FUNCTIONAL ASSESSMENT: PAIN_FUNCTIONAL_ASSESSMENT: 0-10

## 2025-06-02 ASSESSMENT — ENCOUNTER SYMPTOMS
LOSS OF SENSATION IN FEET: 0
OCCASIONAL FEELINGS OF UNSTEADINESS: 0
DEPRESSION: 0

## 2025-06-02 ASSESSMENT — PAIN DESCRIPTION - DESCRIPTORS: DESCRIPTORS: BURNING;SORE

## 2025-06-02 ASSESSMENT — PAIN SCALES - GENERAL: PAINLEVEL_OUTOF10: 4

## 2025-06-02 NOTE — PROGRESS NOTES
Physical Therapy    Physical Therapy Lower Extremity Evaluation    Patient Name: Raphael Crain  MRN: [id number]  Today's Date: 6/2/2025  Time Calculation  Start Time: 0915  Stop Time: 0955  Time Calculation (min): 40 min  PT Evaluation Time Entry  PT Evaluation (Low) Time Entry: 30  PT Therapeutic Procedures Time Entry  Therapeutic Exercise Time Entry: 10  Payor: HUMANA MEDICARE / Plan: HUMANA GOLD CHOICE / Product Type: *No Product type* /     Reason for Referral: R knee meniscectomy 5/27/25  Referred By: Justin  General Comment: Visit 1 of 10    Current Problem  Problem List Items Addressed This Visit           ICD-10-CM    Acute medial meniscus tear, right, subsequent encounter S83.241D    Difficulty walking - Primary R26.2    Acute pain of right knee M25.561      Precautions  Precautions  STEADI Fall Risk Score (The score of 4 or more indicates an increased risk of falling): 0  Precautions Comment: post op meniscus     Pain  Pain Assessment: 0-10  0-10 (Numeric) Pain Score: 4 (6/10 at worst at night)  Pain Type: Surgical pain  Pain Location: Knee  Pain Orientation: Right  Pain Descriptors: Burning, Sore  Pain Frequency: Constant/continuous    SUBJECTIVE:   Chief complaint:  Pt reports he underwent a meniscectomy on 5/27/25 due to re-tear to the right mensicus form a re injury in March 2025. Was previously seen for post op meniscus in Oct-Dec 2024. Feels minimal pain but soreness/burning medial knee. Notes using quad cane for gait. Notes mostly stiffness at this time. Doesn't note significant weakness but does note swelling.     Pain Better: ice, elevate, OTC tylenol     Pain Worse: activity, WB    Imaging:no imaging post op     Prior level of function: previously independent with all prior activity.   Current limitations: stairs, walking uneven surface, running, squatting.     Home setup:reviewed and no concern.     Work:     Patients goal: return to normal activity     Prior tx: Previous PT L  knee  in 2024 meniscus tear.     Medical hx has been reviewed with the patient.     OBJECTIVE:    Lower Extremity Strength:  MMT 5/5 max  RIGHT LEFT   Hip Flexion 4+ 5   Hip Extension 4+ 5   Hip Abduction 4+ 5   Hip Adduction 4+ 5   Knee Extension 4 5   Knee Flexion 4 5   Ankle DF 4+ 5   Ankle PF 4+ 5     Lower Extremity ROM: WNL unless documented below:  ROM in Degrees  RIGHT LEFT   Knee Extension -2 0   Knee Flexion 115 135     Joint mobility: Hypomobile R patella all ranges  Girth: R knee mid Jt 40.5 cm, L 39 cm   Gait mechanics: Using small based quad cane L UE.   Palpation: No pain with palpation except around portal site medial knee.     Other Measures  Lower Extremity Funtional Score (LEFS): 30     TREATMENT:  Eval:  2. Instruction in HEP:  Access Code: [email or web address]  URL: [email or web address]  Date: 06/02/2025  Prepared by:  Sid    Exercises  - Supine Quad Set (Mirrored)  - 1 x daily - 7 x weekly - 2 sets - 10 reps  - Active Straight Leg Raise with Quad Set  - 1 x daily - 7 x weekly - 2 sets - 10 reps  - Supine Heel Slide with Strap  - 1 x daily - 7 x weekly - 2 sets - 10 reps  - Sidelying Hip Abduction  - 1 x daily - 7 x weekly - 2 sets - 10 reps  - Prone Hip Extension  - 1 x daily - 7 x weekly - 2 sets - 10 reps  - Supine Hip Adduction Isometric with Ball  - 1 x daily - 7 x weekly - 2 sets - 10 reps  - Seated Long Arc Quad  - 1 x daily - 7 x weekly - 2 sets - 10 reps    ASSESSEMENT  Pt is a 69 y.o. referred to physical therapy for a dx of R knee meniscectomy 5/27/25 by Suhail Anderson MD. Pt presents with signs and symptoms of pain, weakness, loss of motion, reduced gait/balance and overall reduced function. This pt would benefit from a therapy program to restore prior level of function, reduce pain, increase AROM, increase strength, and improve gait and balance.   Complexity: Low  Clinical presentation: Stable and/or uncomplicated characteristics  The physical therapy prognosis  is good for the patient to achieve their goals.   The pt tolerated therapy treatment today well with no adverse effects.  Personal Factors/Barriers to therapy include:  none     PLAN  The pt will be seen 2 days a week for 6 weeks.      The pt has been educated about the risks and benefits of physical therapy including manual therapy treatments. Pt agrees with POC and gives consent for treatment.     The patient will benefit from physical therapy treatment to include: therapeutic exercises, therapeutic activities, neurological re-education, manual therapy, modalities, and a home exercise program.     Goals:  Active       PT Problem       Reduce pain at worst to 1-2/10 with all prior activity.        Start:  06/02/25    Expected End:  06/23/25            PT to ambulate with no A.D. FWB for all ambulation.        Start:  06/02/25    Expected End:  06/23/25            Reduce pain at worst to 0/10 with all prior activity.        Start:  06/02/25    Expected End:  07/14/25            Increase knee flexion to 135 degrees and ext to 0 degrees for gait, stairs, and ADL's.       Start:  06/02/25    Expected End:  07/14/25            Pt to increase LE strength to grossly 5/5 for improved gait, stairs, lifting and ADL's.        Start:  06/02/25    Expected End:  07/14/25            Pt to score an increase of 10 points or > on LEFS to display overall increased function.         Start:  06/02/25    Expected End:  07/14/25            Pt to be independent with a HEP for self management.        Start:  06/02/25    Expected End:  07/14/25

## 2025-06-04 ENCOUNTER — TREATMENT (OUTPATIENT)
Dept: PHYSICAL THERAPY | Facility: CLINIC | Age: 70
End: 2025-06-04
Payer: MEDICARE

## 2025-06-04 DIAGNOSIS — M25.561 ACUTE PAIN OF RIGHT KNEE: ICD-10-CM

## 2025-06-04 DIAGNOSIS — S83.241D ACUTE MEDIAL MENISCUS TEAR, RIGHT, SUBSEQUENT ENCOUNTER: ICD-10-CM

## 2025-06-04 DIAGNOSIS — R26.2 DIFFICULTY WALKING: Primary | ICD-10-CM

## 2025-06-04 PROCEDURE — 97140 MANUAL THERAPY 1/> REGIONS: CPT | Mod: GP | Performed by: PHYSICAL THERAPIST

## 2025-06-04 PROCEDURE — 97110 THERAPEUTIC EXERCISES: CPT | Mod: GP | Performed by: PHYSICAL THERAPIST

## 2025-06-04 ASSESSMENT — PAIN - FUNCTIONAL ASSESSMENT: PAIN_FUNCTIONAL_ASSESSMENT: 0-10

## 2025-06-04 ASSESSMENT — PAIN SCALES - GENERAL: PAINLEVEL_OUTOF10: 3

## 2025-06-04 NOTE — PROGRESS NOTES
Physical Therapy    Physical Therapy Treatment    Patient Name: Raphael Crain  MRN: [id number]  Today's Date: 6/4/2025  Time Calculation  Start Time: 0915  Stop Time: 1000  Time Calculation (min): 45 min  PT Therapeutic Procedures Time Entry  Manual Therapy Time Entry: 10  Therapeutic Exercise Time Entry: 35  Payor: HUMANA MEDICARE / Plan: HUMANA GOLD CHOICE / Product Type: *No Product type* /     Reason for Referral: R knee meniscectomy 5/27/25  Referred By: Justin  General Comment: Visit 2 of 10    Current Problem    Problem List Items Addressed This Visit           ICD-10-CM    Acute medial meniscus tear, right, subsequent encounter S83.241D    Difficulty walking - Primary R26.2    Acute pain of right knee M25.561        Subjective   Pt overall notes he feels he is doing better since eval on Monday. Slight decrease in pain. Notes only the side lying abd is difficulty all other exercises on HEP ok.   Compliance with HEP-yes    Precautions  Precautions  STEADI Fall Risk Score (The score of 4 or more indicates an increased risk of falling): no change  Precautions Comment: post op meniscus    Pain  Pain Assessment: 0-10  0-10 (Numeric) Pain Score: 3  Pain Location: Knee  Pain Orientation: Right    Objective   No measures     Treatments:  There-ex:  Upright bike x 5 min  Calf incline stretch x 1 min  HS stretch at step x 1 min ea B LE  Knee flexion stretch step 10 reps x 5 sec  Airex march x 2 min   Airex tandem x 1 min   4 plane SLR 2 x 10   Hip add iso 2 x 10   Clamshells RTB 2 x 10    Manual Tx:  Patella mobs all ranges grade 2-3 , manual stretch R knee flex/ext   Informed consent given on manual treatment. Pt given opportunity to cease treatment at any time. Educated pt on expected soreness, possible ecchymosis. Pt voiced understanding  No adverse effects were noted post tx.      Current HEP:  Access Code: [email or web address]  URL: [email or web address]  Date: 06/02/2025  Prepared by: LEN  [place]    Exercises  - Supine Quad Set (Mirrored)  - 1 x daily - 7 x weekly - 2 sets - 10 reps  - Active Straight Leg Raise with Quad Set  - 1 x daily - 7 x weekly - 2 sets - 10 reps  - Supine Heel Slide with Strap  - 1 x daily - 7 x weekly - 2 sets - 10 reps  - Sidelying Hip Abduction  - 1 x daily - 7 x weekly - 2 sets - 10 reps  - Prone Hip Extension  - 1 x daily - 7 x weekly - 2 sets - 10 reps  - Supine Hip Adduction Isometric with Ball  - 1 x daily - 7 x weekly - 2 sets - 10 reps  - Seated Long Arc Quad  - 1 x daily - 7 x weekly - 2 sets - 10 reps    Assessment:   Pt overall tolerated treatment well and progressing with his knee. Improved ROM noted today and progressed with WB on the R LE around the clinic. Continue with current HEP.     Plan:   Continue to progress WB and strength on the R knee.    Goals:  Active       PT Problem       Reduce pain at worst to 1-2/10 with all prior activity.        Start:  06/02/25    Expected End:  06/23/25            PT to ambulate with no A.D. FWB for all ambulation.        Start:  06/02/25    Expected End:  06/23/25            Reduce pain at worst to 0/10 with all prior activity.        Start:  06/02/25    Expected End:  07/14/25            Increase knee flexion to 135 degrees and ext to 0 degrees for gait, stairs, and ADL's.       Start:  06/02/25    Expected End:  07/14/25            Pt to increase LE strength to grossly 5/5 for improved gait, stairs, lifting and ADL's.        Start:  06/02/25    Expected End:  07/14/25            Pt to score an increase of 10 points or > on LEFS to display overall increased function.         Start:  06/02/25    Expected End:  07/14/25            Pt to be independent with a HEP for self management.        Start:  06/02/25    Expected End:  07/14/25

## 2025-06-09 ENCOUNTER — TREATMENT (OUTPATIENT)
Dept: PHYSICAL THERAPY | Facility: CLINIC | Age: 70
End: 2025-06-09
Payer: MEDICARE

## 2025-06-09 DIAGNOSIS — S83.241D ACUTE MEDIAL MENISCUS TEAR, RIGHT, SUBSEQUENT ENCOUNTER: ICD-10-CM

## 2025-06-09 DIAGNOSIS — R26.2 DIFFICULTY WALKING: Primary | ICD-10-CM

## 2025-06-09 DIAGNOSIS — M25.561 ACUTE PAIN OF RIGHT KNEE: ICD-10-CM

## 2025-06-09 PROCEDURE — 97110 THERAPEUTIC EXERCISES: CPT | Mod: GP,CQ

## 2025-06-09 PROCEDURE — 97140 MANUAL THERAPY 1/> REGIONS: CPT | Mod: GP,CQ

## 2025-06-09 ASSESSMENT — PAIN DESCRIPTION - DESCRIPTORS: DESCRIPTORS: BURNING;SORE

## 2025-06-09 ASSESSMENT — PAIN SCALES - GENERAL: PAINLEVEL_OUTOF10: 4

## 2025-06-09 ASSESSMENT — PAIN - FUNCTIONAL ASSESSMENT: PAIN_FUNCTIONAL_ASSESSMENT: 0-10

## 2025-06-09 NOTE — PROGRESS NOTES
"Physical Therapy    Physical Therapy Treatment    Patient Name: Raphael Crain  MRN: [id number]  Today's Date: 6/9/2025  Time Calculation  Start Time: 1000  Stop Time: 1045  Time Calculation (min): 45 min  PT Therapeutic Procedures Time Entry  Manual Therapy Time Entry: 9  Therapeutic Exercise Time Entry: 36  Payor: HUMANA MEDICARE / Plan: HUMANA GOLD CHOICE / Product Type: *No Product type* /     Reason for Referral: R knee meniscectomy 5/27/25  Referred By: Justin  General Comment: Visit 3 of 10    Current Problem    Problem List Items Addressed This Visit           ICD-10-CM    Acute medial meniscus tear, right, subsequent encounter S83.241D    Difficulty walking - Primary R26.2    Acute pain of right knee M25.561        Subjective   Pt reports he is still experiencing medial knee pain \"burning\".  Difficulty sleeping d/t discomfort, usually a side sleeper.   He accidentally left his cane [place] the movie theater yesterday.  Compliance with HEP-yes    Precautions  Precautions  Precautions Comment: post op meniscus    Pain  Pain Assessment: 0-10  0-10 (Numeric) Pain Score: 4  Pain Location: Knee  Pain Orientation: Right  Pain Descriptors: Burning, Sore  Pain Frequency: Constant/continuous    Objective   No measures     Treatments:  There-ex:  Upright bike x 5 min/ Nustep LE's only x 5 min   Calf incline stretch x 1 min  HS stretch at step x 1 min ea B LE  Knee flexion stretch step 10 reps x 5 sec  Standing HR/TR 2x10   R fwd step up with TKE at top 6'' 2x10  R retro step/step down 4'' x10   Lateral walking along railing x4L back and forth , YTB   Monster walk fwd/retro along railing x 2L, YTB     Neuro:  Airex march x 2 min   Airex tandem x 1 min    AE step up 2x10, R lead no UE support     Manual Tx:  Patella mobs all ranges grade 1-2, manual stretch R knee flex/ext   STM to medial/posterior knee as tolerated     Informed consent given on manual treatment. Pt given opportunity to cease treatment at any time. Educated " pt on expected soreness, possible ecchymosis. Pt voiced understanding  No adverse effects were noted post tx.      Current HEP:  Access Code: [email or web address]  URL: [email or web address]  Date: 06/02/2025  Prepared by:  Sid    Exercises  - Supine Quad Set (Mirrored)  - 1 x daily - 7 x weekly - 2 sets - 10 reps  - Active Straight Leg Raise with Quad Set  - 1 x daily - 7 x weekly - 2 sets - 10 reps  - Supine Heel Slide with Strap  - 1 x daily - 7 x weekly - 2 sets - 10 reps  - Sidelying Hip Abduction  - 1 x daily - 7 x weekly - 2 sets - 10 reps  - Prone Hip Extension  - 1 x daily - 7 x weekly - 2 sets - 10 reps  - Supine Hip Adduction Isometric with Ball  - 1 x daily - 7 x weekly - 2 sets - 10 reps  - Seated Long Arc Quad  - 1 x daily - 7 x weekly - 2 sets - 10 reps    Assessment:   Progressed program today. Pt did well with persistent medial knee pain but was able to complete each exercise. Improved gait pattern with cueing for knee ext.  Discussed expected soreness post tx with exercise progressions.,     Plan:   Continue to progress WB and strength on the R knee.    Goals:  Active       PT Problem       Reduce pain at worst to 1-2/10 with all prior activity.        Start:  06/02/25    Expected End:  06/23/25            PT to ambulate with no A.D. FWB for all ambulation.        Start:  06/02/25    Expected End:  06/23/25            Reduce pain at worst to 0/10 with all prior activity.        Start:  06/02/25    Expected End:  07/14/25            Increase knee flexion to 135 degrees and ext to 0 degrees for gait, stairs, and ADL's.       Start:  06/02/25    Expected End:  07/14/25            Pt to increase LE strength to grossly 5/5 for improved gait, stairs, lifting and ADL's.        Start:  06/02/25    Expected End:  07/14/25            Pt to score an increase of 10 points or > on LEFS to display overall increased function.         Start:  06/02/25    Expected End:  07/14/25            Pt to  be independent with a HEP for self management.        Start:  06/02/25    Expected End:  07/14/25

## 2025-06-11 ENCOUNTER — APPOINTMENT (OUTPATIENT)
Dept: ORTHOPEDIC SURGERY | Age: 70
End: 2025-06-11
Payer: MEDICARE

## 2025-06-11 VITALS — BODY MASS INDEX: 27.2 KG/M2 | WEIGHT: 190 LBS | HEIGHT: 70 IN

## 2025-06-11 DIAGNOSIS — S83.241D ACUTE MEDIAL MENISCUS TEAR OF RIGHT KNEE, SUBSEQUENT ENCOUNTER: Primary | ICD-10-CM

## 2025-06-11 PROCEDURE — 99024 POSTOP FOLLOW-UP VISIT: CPT

## 2025-06-11 PROCEDURE — 1036F TOBACCO NON-USER: CPT

## 2025-06-11 PROCEDURE — 1160F RVW MEDS BY RX/DR IN RCRD: CPT

## 2025-06-11 PROCEDURE — 1125F AMNT PAIN NOTED PAIN PRSNT: CPT

## 2025-06-11 PROCEDURE — 3008F BODY MASS INDEX DOCD: CPT

## 2025-06-11 PROCEDURE — 4010F ACE/ARB THERAPY RXD/TAKEN: CPT

## 2025-06-11 PROCEDURE — 1159F MED LIST DOCD IN RCRD: CPT

## 2025-06-11 ASSESSMENT — PAIN SCALES - GENERAL: PAINLEVEL_OUTOF10: 3

## 2025-06-11 ASSESSMENT — PAIN - FUNCTIONAL ASSESSMENT: PAIN_FUNCTIONAL_ASSESSMENT: 0-10

## 2025-06-11 ASSESSMENT — PAIN DESCRIPTION - DESCRIPTORS: DESCRIPTORS: BURNING

## 2025-06-11 NOTE — PROGRESS NOTES
PRIMARY CARE PHYSICIAN: Keely Bradley MD    ORTHOPAEDIC POSTOPERATIVE VISIT    ASSESSMENT & PLAN    Impression: 69 y.o. male 2 weeks postop s/p Right knee arthroscopy, partial medial meniscectomy, intra-articular debridement and synovectomy, chondroplasty     Plan:   Overall Raphael is doing well. He has some persistent post-operative pain. A refill for his Meloxicam was provided for him today. His incisions are healing well. Raphael will continue to work with physical therapy through the protocol for the above. We discussed his post-operative precautions and he expressed understanding. He may progress his activities as he tolerates. He is to rest and ice his knee as he needs and follow-up in 4 weeks.     I reviewed the intraoperative findings with the patient and answered all their questions. I reviewed their postoperative timeline and plan with them. They understand the postoperative precautions and the treatment plan going forward.     Follow-Up: Patient will follow-up in 4 weeks, no x-rays    At the end of the visit, all questions were answered in full. The patient is in agreement with the plan and recommendations. They will call the office with any questions/concerns.    Note dictated with e27 software. Completed without full typed error editing and sent to avoid delay.    SUBJECTIVE  CHIEF COMPLAINT:   Chief Complaint   Patient presents with    Right Knee - Post-op        HPI: Raphael Crain is a 69 y.o. patient now 2 weeks postop status post Right knee arthroscopy, partial medial meniscectomy, intra-articular debridement and synovectomy, chondroplasty, done 5/27/25. Pt states that overall he is doing fair. Reports some persistent swelling. States his pain is worse at night with difficulty sleeping. Currently not taking any medications as he has run out of tylenol and Mobic. No concerns at this time.    REVIEW OF SYSTEMS  Constitutional: See HPI for pain assessment, No significant  recent weight gain or loss, no fever or chills  Cardiovascular: No chest pain, shortness of breath  Respiratory: No difficulty breathing, no cough  Gastrointestinal: No nausea, vomiting, diarrhea, constipation  Musculoskeletal: Noted in HPI, positive for pain, restricted motion, stiffness  Integumentary: No rashes, easy bruising or skin lesions  Neurological: No headache, no numbness or tingling in extremities  Psychiatric: No mood changes or memory changes. No social issues  Heme/Lymph: No excessive swelling, easy bruising or excessive bleeding  ENT: No hearing changes, no nosebleeds  Eyes: No vision changes    CURRENT MEDICATIONS:   Current Medications[1]     OBJECTIVE    PHYSICAL EXAM      5/27/2025    12:40 PM 5/27/2025    12:45 PM 5/27/2025    12:50 PM 5/27/2025    12:55 PM 5/27/2025     1:00 PM 5/27/2025     1:15 PM 5/27/2025     1:30 PM   Vitals   Systolic  125  128 128 134 144   Diastolic  78  79 79 85 82   BP Location    Left arm      Heart Rate 44 45 47 46 45 55 56   Temp     36.5 °C (97.7 °F) 36.4 °C (97.5 °F) 36.2 °C (97.2 °F)   Resp 10 8 9 10 10 14 14      There is no height or weight on file to calculate BMI.    General: Well-appearing, no acute distress    Skin intact bilateral upper and lower extremities  No erythema  No warmth    Detailed examination of right knee demonstrates:  Surgical incisions healing well  No erythema or warmth  No drainage  No ecchymosis  small effusion  Resolving swelling, minimal  Knee range of motion: 0-0-120  Mild to moderate early quadriceps inhibition and trace atrophy  Patella mobility 1+ medial and lateral  Lower extremity motor grossly intact  L4-S1 sensation intact bilaterally  2+ DP/PT pulses bilaterally  Warm and well-perfused, brisk capillary refill    IMAGING:   No new imaging          [1]   Current Outpatient Medications   Medication Sig Dispense Refill    acetaminophen (Tylenol) 500 mg tablet Take 2 tablets (1,000 mg) by mouth every 8 hours if needed for mild  pain (1 - 3) for up to 20 days. 60 tablet 1    amLODIPine (Norvasc) 5 mg tablet Take 1 tablet (5 mg) by mouth once daily. 90 tablet 1    aspirin ([place]) 81 mg capsule Take 81 mg by mouth once daily.      aspirin 81 mg EC tablet Take 1 tablet (81 mg) by mouth 2 times a day for 15 days. 30 tablet 0    cyanocobalamin (Vitamin B-12) 1,000 mcg tablet Take 1 tablet (1,000 mcg) by mouth once daily.      dicyclomine (Bentyl) 20 mg tablet Take 1 tablet (20 mg) by mouth 4 times a day as needed (For abdominal pain, bloating, and cramping). Do not take immediately prior to driving as this medication may cause drowsiness. 120 tablet 11    gabapentin (Neurontin) 300 mg capsule Take 1 capsule (300 mg) by mouth 3 times a day. (Patient not taking: Reported on 4/14/2025) 90 capsule 3    levothyroxine (Synthroid, Levoxyl) 150 mcg tablet Take 1 tablet (150 mcg) by mouth once daily. 90 tablet 1    losartan (Cozaar) 100 mg tablet Take 1 tablet (100 mg) by mouth once daily. 90 tablet 1    meloxicam (Mobic) 15 mg tablet Take 1 tablet (15 mg) by mouth once daily.      metFORMIN (Glucophage) 1,000 mg tablet Take 1 tablet (1,000 mg) by mouth once daily with breakfast. 100 tablet 3    metoprolol succinate XL (Toprol-XL) 50 mg 24 hr tablet Take 1 tablet (50 mg) by mouth once daily. 90 tablet 3    omeprazole (PriLOSEC) 40 mg DR capsule Take 1 capsule (40 mg) by mouth once daily. 90 capsule 1    pravastatin (Pravachol) 40 mg tablet Take 1 tablet (40 mg) by mouth once daily. 90 tablet 1     No current facility-administered medications for this visit.

## 2025-06-12 ENCOUNTER — TREATMENT (OUTPATIENT)
Dept: PHYSICAL THERAPY | Facility: CLINIC | Age: 70
End: 2025-06-12
Payer: MEDICARE

## 2025-06-12 DIAGNOSIS — M25.561 ACUTE PAIN OF RIGHT KNEE: ICD-10-CM

## 2025-06-12 DIAGNOSIS — R26.2 DIFFICULTY WALKING: Primary | ICD-10-CM

## 2025-06-12 DIAGNOSIS — S83.241D ACUTE MEDIAL MENISCUS TEAR, RIGHT, SUBSEQUENT ENCOUNTER: ICD-10-CM

## 2025-06-12 PROCEDURE — 97110 THERAPEUTIC EXERCISES: CPT | Mod: GP | Performed by: PHYSICAL THERAPIST

## 2025-06-12 PROCEDURE — 97140 MANUAL THERAPY 1/> REGIONS: CPT | Mod: GP | Performed by: PHYSICAL THERAPIST

## 2025-06-12 ASSESSMENT — PAIN - FUNCTIONAL ASSESSMENT: PAIN_FUNCTIONAL_ASSESSMENT: 0-10

## 2025-06-12 ASSESSMENT — PAIN SCALES - GENERAL: PAINLEVEL_OUTOF10: 3

## 2025-06-12 NOTE — PROGRESS NOTES
Physical Therapy    Physical Therapy Treatment    Patient Name: Raphael Crain  MRN: [id number]  Today's Date: 6/12/2025  Time Calculation  Start Time: 0750  Stop Time: 0829  Time Calculation (min): 39 min  PT Therapeutic Procedures Time Entry  Therapeutic Exercise Time Entry: 26  Neuromuscular Re-Education Time Entry: 4  Manual Therapy Time Entry: 9  Payor: HUMANA MEDICARE / Plan: HUMANA GOLD CHOICE / Product Type: *No Product type* /     Reason for Referral: R knee meniscectomy 5/27/25  Referred By: Justin  General Comment: Visit 4 of 10    Current Problem    Problem List Items Addressed This Visit           ICD-10-CM    Acute medial meniscus tear, right, subsequent encounter S83.241D    Difficulty walking - Primary R26.2    Acute pain of right knee M25.561        Subjective   Pt reports he slept well last night.   He is using wide based cane prn.   He states he continues to have persistent pain in medial aspect of his knee  He saw the PA-C yesterday.   He is negotiating stairs reciprocally.   Compliance with HEP-yes    Precautions  Precautions  Precautions Comment: post op meniscus    Pain  Pain Assessment: 0-10  0-10 (Numeric) Pain Score: 3    Objective   No measures     Treatments:  There-ex:  Upright bike x 5 min/ Nustep LE's only x 5 min   Calf incline stretch 2 po x 1 min  HS stretch at step x 1 min ea B LE  Knee flexion stretch step 10 reps x 5 sec  Standing HR/TR 1/2 foam 2x10   Wall sit next visit   Mini lunge dips to 2 AE x 10 with UE support   HS curl BTB 2x10  TKE BTB 2x10, 3 sec hold-noted some increased pain after this exercise    Held:   R fwd step up with TKE at top 6'' 2x10   R retro step/step down 4'' x10   Lateral walking along railing x4L back and forth , YTB   Monster walk fwd/retro along railing x 2L, YTB     Neuro:  Airex march x 2 min   Airex tandem x 1 min    AE step up 2x10, R lead no UE support     Manual Tx:    STM to medial/posterior knee as tolerated     Informed consent given on  manual treatment. Pt given opportunity to cease treatment at any time. Educated pt on expected soreness, possible ecchymosis. Pt voiced understanding  No adverse effects were noted post tx.      Current HEP:  Access Code: [email or web address]  URL: [email or web address]  Date: 06/02/2025  Prepared by:  Sid    Exercises  - Supine Quad Set (Mirrored)  - 1 x daily - 7 x weekly - 2 sets - 10 reps  - Active Straight Leg Raise with Quad Set  - 1 x daily - 7 x weekly - 2 sets - 10 reps  - Supine Heel Slide with Strap  - 1 x daily - 7 x weekly - 2 sets - 10 reps  - Sidelying Hip Abduction  - 1 x daily - 7 x weekly - 2 sets - 10 reps  - Prone Hip Extension  - 1 x daily - 7 x weekly - 2 sets - 10 reps  - Supine Hip Adduction Isometric with Ball  - 1 x daily - 7 x weekly - 2 sets - 10 reps  - Seated Long Arc Quad  - 1 x daily - 7 x weekly - 2 sets - 10 reps    Assessment:  Progressed exercises today. He noted some increased strain in R knee with TR compared to L. He noted some flare in knee symptoms after TKE. He noted massage felt good however some soreness once standing. Advised to take it easy for the rest of the day due to exercise progression.     Plan:   Continue to progress WB and strength on the R knee.    Goals:  Active       PT Problem       Reduce pain at worst to 1-2/10 with all prior activity.        Start:  06/02/25    Expected End:  06/23/25            PT to ambulate with no A.D. FWB for all ambulation.        Start:  06/02/25    Expected End:  06/23/25            Reduce pain at worst to 0/10 with all prior activity.        Start:  06/02/25    Expected End:  07/14/25            Increase knee flexion to 135 degrees and ext to 0 degrees for gait, stairs, and ADL's.       Start:  06/02/25    Expected End:  07/14/25            Pt to increase LE strength to grossly 5/5 for improved gait, stairs, lifting and ADL's.        Start:  06/02/25    Expected End:  07/14/25            Pt to score an increase  of 10 points or > on LEFS to display overall increased function.         Start:  06/02/25    Expected End:  07/14/25            Pt to be independent with a HEP for self management.        Start:  06/02/25    Expected End:  07/14/25

## 2025-06-13 DIAGNOSIS — S83.241D ACUTE MEDIAL MENISCUS TEAR OF RIGHT KNEE, SUBSEQUENT ENCOUNTER: Primary | ICD-10-CM

## 2025-06-13 RX ORDER — MELOXICAM 15 MG/1
15 TABLET ORAL DAILY
Qty: 30 TABLET | Refills: 1 | Status: SHIPPED | OUTPATIENT
Start: 2025-06-13 | End: 2025-08-12

## 2025-06-16 ENCOUNTER — TREATMENT (OUTPATIENT)
Dept: PHYSICAL THERAPY | Facility: CLINIC | Age: 70
End: 2025-06-16
Payer: MEDICARE

## 2025-06-16 DIAGNOSIS — S83.241D ACUTE MEDIAL MENISCUS TEAR, RIGHT, SUBSEQUENT ENCOUNTER: ICD-10-CM

## 2025-06-16 DIAGNOSIS — M25.561 ACUTE PAIN OF RIGHT KNEE: ICD-10-CM

## 2025-06-16 DIAGNOSIS — R26.2 DIFFICULTY WALKING: Primary | ICD-10-CM

## 2025-06-16 PROCEDURE — 97140 MANUAL THERAPY 1/> REGIONS: CPT | Mod: GP | Performed by: PHYSICAL THERAPIST

## 2025-06-16 PROCEDURE — 97110 THERAPEUTIC EXERCISES: CPT | Mod: GP | Performed by: PHYSICAL THERAPIST

## 2025-06-16 ASSESSMENT — PAIN SCALES - GENERAL: PAINLEVEL_OUTOF10: 4

## 2025-06-16 ASSESSMENT — PAIN - FUNCTIONAL ASSESSMENT: PAIN_FUNCTIONAL_ASSESSMENT: 0-10

## 2025-06-16 NOTE — PROGRESS NOTES
Physical Therapy    Physical Therapy Treatment    Patient Name: Raphael Crain  MRN: [id number]  Today's Date: 6/16/2025  Time Calculation  Start Time: 0919  Stop Time: 1001  Time Calculation (min): 42 min  PT Therapeutic Procedures Time Entry  Therapeutic Exercise Time Entry: 30  Manual Therapy Time Entry: 12  Payor: HUMANA MEDICARE / Plan: HU[employer] GOLD CHOICE / Product Type: *No Product type* /     Reason for Referral: R knee meniscectomy 5/27/25  Referred By: Justin  General Comment: Visit 5 of 10    Current Problem    Problem List Items Addressed This Visit           ICD-10-CM    Acute medial meniscus tear, right, subsequent encounter S83.241D    Difficulty walking - Primary R26.2    Acute pain of right knee M25.561        Subjective   Pt reports he was very sore after last session.   Nights continue to be the problem. He is unable to get in a comfortable position.   Has a twinge that doesn't go away. This is felt in the medial aspect of his knee   There has been some improvement since the scope but still very limited due to pain     Compliance with HEP-yes    Precautions  Precautions  Precautions Comment: post op meniscus    Pain  Pain Assessment: 0-10  0-10 (Numeric) Pain Score: 4    Objective   Pt TTP over pes anserine region, distal sartorius and gracilis region     Treatments:  There-ex:  Upright bike x 5 min/ Nustep LE's only x 5 min   Calf incline stretch 2 po x 1 min  HS stretch at step x 1 min ea B LE  HS curl BTB 2x10  TKE iso with yellow ball 2x10  *Time spent reviewing knee anatomy, imaging and performing palpation   Pt TTP over pes anserine region, distal sartorius and gracilis region   Butterfly stretch x 1 min   Standing adductor stretch on step x 1 min     Held   Knee flexion stretch step 10 reps x 5 sec  Standing HR/TR 1/2 foam 2x10   Wall sit next visit   Mini lunge dips to 2 AE x 10 with UE support   R fwd step up with TKE at top 6'' 2x10   R retro step/step down 4'' x10   Lateral walking along  railing x4L back and forth , YTB   Monster walk fwd/retro along railing x 2L, YTB     Neuro:  Airex march x 2 min   Airex tandem x 1 min    AE step up 2x10, R lead no UE support     Manual Tx:  STM to medial aspect of knee, distal thigh     Informed consent given on manual treatment. Pt given opportunity to cease treatment at any time. Educated pt on expected soreness, possible ecchymosis. Pt voiced understanding  No adverse effects were noted post tx.      Access Code: [email or web address]  URL: [email or web address]  Date: 06/16/2025  Prepared by: Asia Medina    Exercises  - Supine Quad Set (Mirrored)  - 1 x daily - 7 x weekly - 2 sets - 10 reps  - Active Straight Leg Raise with Quad Set  - 1 x daily - 7 x weekly - 2 sets - 10 reps  - Supine Heel Slide with Strap  - 1 x daily - 7 x weekly - 2 sets - 10 reps  - Sidelying Hip Abduction  - 1 x daily - 7 x weekly - 2 sets - 10 reps  - Prone Hip Extension  - 1 x daily - 7 x weekly - 2 sets - 10 reps  - Supine Hip Adduction Isometric with Ball  - 1 x daily - 7 x weekly - 2 sets - 10 reps  - Seated Long Arc Quad  - 1 x daily - 7 x weekly - 2 sets - 10 reps  - Adductor Stretch on Chair  - 1 x daily - 7 x weekly - 2 reps - 30 seconds hold  - Supine Butterfly Groin Stretch  - 1 x daily - 7 x weekly - 2 reps - 30 seconds hold    Assessment:  Pt very frustrated with lack of progress and inability to resume normal recreational activity. He did have TTP in medial aspect of knee and distal thigh. Question if there is some ST strain, pes anserine bursitis. STM did produce his typical pain.   Discussed that he is only about 3 weeks post op and giving the healing about 3 more weeks should be telling if he is improving or not post operatively     Plan:   Continue to progress WB and strength on the R knee.    Goals:  Active       PT Problem       Reduce pain at worst to 1-2/10 with all prior activity.        Start:  06/02/25    Expected End:  06/23/25            PT to  ambulate with no A.D. FWB for all ambulation.        Start:  06/02/25    Expected End:  06/23/25            Reduce pain at worst to 0/10 with all prior activity.        Start:  06/02/25    Expected End:  07/14/25            Increase knee flexion to 135 degrees and ext to 0 degrees for gait, stairs, and ADL's.       Start:  06/02/25    Expected End:  07/14/25            Pt to increase LE strength to grossly 5/5 for improved gait, stairs, lifting and ADL's.        Start:  06/02/25    Expected End:  07/14/25            Pt to score an increase of 10 points or > on LEFS to display overall increased function.         Start:  06/02/25    Expected End:  07/14/25            Pt to be independent with a HEP for self management.        Start:  06/02/25    Expected End:  07/14/25

## 2025-06-18 ENCOUNTER — TREATMENT (OUTPATIENT)
Dept: PHYSICAL THERAPY | Facility: CLINIC | Age: 70
End: 2025-06-18
Payer: MEDICARE

## 2025-06-18 DIAGNOSIS — S83.241D ACUTE MEDIAL MENISCUS TEAR, RIGHT, SUBSEQUENT ENCOUNTER: ICD-10-CM

## 2025-06-18 DIAGNOSIS — M25.561 ACUTE PAIN OF RIGHT KNEE: ICD-10-CM

## 2025-06-18 DIAGNOSIS — R26.2 DIFFICULTY WALKING: Primary | ICD-10-CM

## 2025-06-18 PROCEDURE — 97140 MANUAL THERAPY 1/> REGIONS: CPT | Mod: GP | Performed by: PHYSICAL THERAPIST

## 2025-06-18 PROCEDURE — 97110 THERAPEUTIC EXERCISES: CPT | Mod: GP | Performed by: PHYSICAL THERAPIST

## 2025-06-18 ASSESSMENT — PAIN SCALES - GENERAL: PAINLEVEL_OUTOF10: 3

## 2025-06-18 ASSESSMENT — PAIN - FUNCTIONAL ASSESSMENT: PAIN_FUNCTIONAL_ASSESSMENT: 0-10

## 2025-06-18 NOTE — PROGRESS NOTES
Physical Therapy    Physical Therapy Treatment    Patient Name: Raphael Crain  MRN: [id number]  Today's Date: 6/18/2025  Time Calculation  Start Time: 0915  Stop Time: 1000  Time Calculation (min): 45 min     PT Therapeutic Procedures Time Entry  Therapeutic Exercise Time Entry: 30  Neuromuscular Re-Education Time Entry: 5  Manual Therapy Time Entry: 10                 Payor: HUMANA MEDICARE / Plan: HUMANA GOLD CHOICE / Product Type: *No Product type* /     Reason for Referral: R knee meniscectomy 5/27/25  Referred By: Justin  General Comment: Visit 6 of 10    Current Problem    Problem List Items Addressed This Visit           ICD-10-CM    Acute medial meniscus tear, right, subsequent encounter S83.241D    Difficulty walking - Primary R26.2    Acute pain of right knee M25.561      Subjective   Py overall notes the abductor stretch helped last visit with the medial knee pain.   Compliance with HEP-yes    Precautions  Precautions  Precautions Comment: post op meniscus    Pain  Pain Assessment: 0-10  0-10 (Numeric) Pain Score: 3  Pain Location: Knee  Pain Orientation: Right    Objective   No measures today     Treatments:  There-ex:  Upright bike x 5 min/ Nustep LE's only x 5 min   Calf incline stretch 2 po x 1 min  HS stretch at step x 1 min ea B LE  HS curl BTB 2x10  TKE iso with yellow ball 2 x 10  Slider hip abd and ext 10x ea  Butterfly stretch x 1 min   Standing adductor stretch on step x 1 min     Held   Knee flexion stretch step 10 reps x 5 sec  Standing HR/TR 1/2 foam 2x10   Wall sit next visit   Mini lunge dips to 2 AE x 10 with UE support   R fwd step up with TKE at top 6'' 2x10   R retro step/step down 4'' x10   Lateral walking along railing x4L back and forth , YTB   Monster walk fwd/retro along railing x 2L, YTB     Neuro:  Airex march x 2 min   Airex tandem x 1 min    AE step up 2x10, R lead no UE support     Manual Tx:  STM to medial aspect of knee, distal thigh, IASTM with pes anserine and adductor  distally.     Informed consent given on manual treatment. Pt given opportunity to cease treatment at any time. Educated pt on expected soreness, possible ecchymosis. Pt voiced understanding  No adverse effects were noted post tx.      Access Code: [email or web address]  URL: [email or web address]  Date: 06/16/2025  Prepared by: Asia Medina    Exercises  - Supine Quad Set (Mirrored)  - 1 x daily - 7 x weekly - 2 sets - 10 reps  - Active Straight Leg Raise with Quad Set  - 1 x daily - 7 x weekly - 2 sets - 10 reps  - Supine Heel Slide with Strap  - 1 x daily - 7 x weekly - 2 sets - 10 reps  - Sidelying Hip Abduction  - 1 x daily - 7 x weekly - 2 sets - 10 reps  - Prone Hip Extension  - 1 x daily - 7 x weekly - 2 sets - 10 reps  - Supine Hip Adduction Isometric with Ball  - 1 x daily - 7 x weekly - 2 sets - 10 reps  - Seated Long Arc Quad  - 1 x daily - 7 x weekly - 2 sets - 10 reps  - Adductor Stretch on Chair  - 1 x daily - 7 x weekly - 2 reps - 30 seconds hold  - Supine Butterfly Groin Stretch  - 1 x daily - 7 x weekly - 2 reps - 30 seconds hold    Assessment:   Pt overall tolerated treatment well today and noted less soreness medial R knee with manual treatment today and IASTM. Overall recommended to continue to progress with stretching ion the knee at home.     Plan:   Continue to progress strength and ROM int he knee and continue with manual to the medial knee.     Goals:  Active       PT Problem       Reduce pain at worst to 1-2/10 with all prior activity.        Start:  06/02/25    Expected End:  06/23/25            PT to ambulate with no A.D. FWB for all ambulation.        Start:  06/02/25    Expected End:  06/23/25            Reduce pain at worst to 0/10 with all prior activity.        Start:  06/02/25    Expected End:  07/14/25            Increase knee flexion to 135 degrees and ext to 0 degrees for gait, stairs, and ADL's.       Start:  06/02/25    Expected End:  07/14/25            Pt to  increase LE strength to grossly 5/5 for improved gait, stairs, lifting and ADL's.        Start:  06/02/25    Expected End:  07/14/25            Pt to score an increase of 10 points or > on LEFS to display overall increased function.         Start:  06/02/25    Expected End:  07/14/25            Pt to be independent with a HEP for self management.        Start:  06/02/25    Expected End:  07/14/25

## 2025-06-24 ENCOUNTER — TREATMENT (OUTPATIENT)
Dept: PHYSICAL THERAPY | Facility: CLINIC | Age: 70
End: 2025-06-24
Payer: MEDICARE

## 2025-06-24 DIAGNOSIS — M25.561 ACUTE PAIN OF RIGHT KNEE: ICD-10-CM

## 2025-06-24 DIAGNOSIS — S83.241D ACUTE MEDIAL MENISCUS TEAR, RIGHT, SUBSEQUENT ENCOUNTER: ICD-10-CM

## 2025-06-24 DIAGNOSIS — R26.2 DIFFICULTY WALKING: Primary | ICD-10-CM

## 2025-06-24 PROCEDURE — 97110 THERAPEUTIC EXERCISES: CPT | Mod: GP | Performed by: PHYSICAL THERAPIST

## 2025-06-24 PROCEDURE — 97140 MANUAL THERAPY 1/> REGIONS: CPT | Mod: GP | Performed by: PHYSICAL THERAPIST

## 2025-06-24 NOTE — PROGRESS NOTES
Physical Therapy    Physical Therapy Treatment    Patient Name: Raphael Crain  MRN: [id number]  Today's Date: 6/24/2025  Visit #7  Time Calculation  Start Time: 0702  Stop Time: 0743  Time Calculation (min): 41 min     PT Therapeutic Procedures Time Entry  Therapeutic Exercise Time Entry: 31  Manual Therapy Time Entry: 10        Payor: HUMANA MEDICARE / Plan: HUMANA GOLD CHOICE / Product Type: *No Product type* /   Referred by:Suhail Anderson  Current Problem  Problem List Items Addressed This Visit           ICD-10-CM    Acute medial meniscus tear, right, subsequent encounter S83.241D    Difficulty walking - Primary R26.2    Acute pain of right knee M25.561        Subjective   Pt reports he had a bad night last night.  Reports his knee is really sore  Current Pain: Worse  Pt has been compliant with HEP Yes      Objective  No objective measures assessed this visit    Assessment:  Pt is progressing slowly towards goals. Pt is frustrated by this recent increase in pain.  He is concerned he has torn the meniscus again.  No swelling noted.    This session exercises were progressed (p) or added (NEW) as documented in the treatment section.  The pt required min to mod cues and demonstration to complete exercises with proper form.    Pt responded to all activities without adverse effects.    Pt continues to lack strength necessary for the completion of baseline ADLs without pain.  Pt will benefit from further therapy to continue to progress towards meeting functional and impairment goals.    Plan:  Continue to progress treatment to improve strength, range of motion, joint mobility, pain, and flexibility impairments which are impacting patient's function.    Treatments:  Visit # 7  Treatments:  There-ex:  Upright bike x 5 min/ Nustep LE's only x 5 min   Calf incline stretch 2 po x 1 min  HS stretch at step x 1 min ea B LE  HS curl BTB 2x10  TKE iso with yellow ball 2 x 10  Slider hip abd and ext 20x ea  Butterfly stretch x  1 min   Standing adductor stretch on step x 1 min   Lateral walking along railing x4L back and forth , YTB   Monster walk fwd/retro along railing x 2L, YTB      Held   Knee flexion stretch step 10 reps x 5 sec  Standing HR/TR 1/2 foam 2x10   Wall sit next visit   Mini lunge dips to 2 AE x 10 with UE support   R fwd step up with TKE at top 6'' 2x10   R retro step/step down 4'' x10      Neuro:  Airex march x 2 min   Airex tandem x 1 min    AE step up 2x10, R lead no UE support      Manual Tx:  STM to medial aspect of knee, distal thigh, IASTM with pes anserine and adductor distally.      Informed consent given on manual treatment. Pt given opportunity to cease treatment at any time. Educated pt on expected soreness, possible ecchymosis. Pt voiced understanding  No adverse effects were noted post tx.       Access Code: [email or web address]  URL: [email or web address]  Date: 06/16/2025  Prepared by: Asia Medina     Exercises  - Supine Quad Set (Mirrored)  - 1 x daily - 7 x weekly - 2 sets - 10 reps  - Active Straight Leg Raise with Quad Set  - 1 x daily - 7 x weekly - 2 sets - 10 reps  - Supine Heel Slide with Strap  - 1 x daily - 7 x weekly - 2 sets - 10 reps  - Sidelying Hip Abduction  - 1 x daily - 7 x weekly - 2 sets - 10 reps  - Prone Hip Extension  - 1 x daily - 7 x weekly - 2 sets - 10 reps  - Supine Hip Adduction Isometric with Ball  - 1 x daily - 7 x weekly - 2 sets - 10 reps  - Seated Long Arc Quad  - 1 x daily - 7 x weekly - 2 sets - 10 reps  - Adductor Stretch on Chair  - 1 x daily - 7 x weekly - 2 reps - 30 seconds hold  - Supine Butterfly Groin Stretch  - 1 x daily - 7 x weekly - 2 reps - 30 seconds hold     Goals:  Active       PT Problem       Reduce pain at worst to 1-2/10 with all prior activity.        Start:  06/02/25    Expected End:  06/23/25            PT to ambulate with no A.D. FWB for all ambulation.        Start:  06/02/25    Expected End:  06/23/25            Reduce pain at  worst to 0/10 with all prior activity.        Start:  06/02/25    Expected End:  07/14/25            Increase knee flexion to 135 degrees and ext to 0 degrees for gait, stairs, and ADL's.       Start:  06/02/25    Expected End:  07/14/25            Pt to increase LE strength to grossly 5/5 for improved gait, stairs, lifting and ADL's.        Start:  06/02/25    Expected End:  07/14/25            Pt to score an increase of 10 points or > on LEFS to display overall increased function.         Start:  06/02/25    Expected End:  07/14/25            Pt to be independent with a HEP for self management.        Start:  06/02/25    Expected End:  07/14/25

## 2025-06-26 ENCOUNTER — TREATMENT (OUTPATIENT)
Dept: PHYSICAL THERAPY | Facility: CLINIC | Age: 70
End: 2025-06-26
Payer: MEDICARE

## 2025-06-26 DIAGNOSIS — S83.241D ACUTE MEDIAL MENISCUS TEAR, RIGHT, SUBSEQUENT ENCOUNTER: ICD-10-CM

## 2025-06-26 DIAGNOSIS — R26.2 DIFFICULTY WALKING: Primary | ICD-10-CM

## 2025-06-26 DIAGNOSIS — M25.561 ACUTE PAIN OF RIGHT KNEE: ICD-10-CM

## 2025-06-26 PROCEDURE — 97140 MANUAL THERAPY 1/> REGIONS: CPT | Mod: GP | Performed by: PHYSICAL THERAPIST

## 2025-06-26 PROCEDURE — 97110 THERAPEUTIC EXERCISES: CPT | Mod: GP | Performed by: PHYSICAL THERAPIST

## 2025-06-26 ASSESSMENT — PAIN SCALES - GENERAL: PAINLEVEL_OUTOF10: 4

## 2025-06-26 ASSESSMENT — PAIN - FUNCTIONAL ASSESSMENT: PAIN_FUNCTIONAL_ASSESSMENT: 0-10

## 2025-06-26 NOTE — PROGRESS NOTES
Physical Therapy    Physical Therapy Treatment    Patient Name: Raphael Crain  MRN: [id number]  Today's Date: 6/26/2025  Time Calculation  Start Time: 0700  Stop Time: 0745  Time Calculation (min): 45 min     PT Therapeutic Procedures Time Entry  Therapeutic Exercise Time Entry: 25  Neuromuscular Re-Education Time Entry: 5  Manual Therapy Time Entry: 10                 Payor: [employer] MEDICARE / Plan: HUMANA GOLD CHOICE / Product Type: *No Product type* /     Reason for Referral: R knee meniscectomy 5/27/25  Referred By: Justin  General Comment: Visit 8 of 10    Current Problem    Problem List Items Addressed This Visit           ICD-10-CM    Acute medial meniscus tear, right, subsequent encounter S83.241D    Difficulty walking - Primary R26.2    Acute pain of right knee M25.561        Subjective   Pt note she feels his knee is back to where he was to get surgery. Feels pain medial knee and its very painful at night.   Compliance with HEP-yes    Precautions  Precautions  Precautions Comment: post op meniscus    Pain  Pain Assessment: 0-10  0-10 (Numeric) Pain Score: 4  Pain Location: Knee  Pain Orientation: Right    Objective   Tenderness to palpation medial patella and jt line.     Treatments:  There-ex:  Upright bike x 5 min/ Nustep LE's only x 5 min   Calf incline stretch 2 po x 1 min  HS stretch at step x 1 min ea B LE  Prather's tape R knee with medial glide to patella  HS curl BTB 2x10  TKE iso with yellow ball 2 x 10  Slider hip abd and ext 20x ea  Butterfly stretch x 1 min -held   Standing adductor stretch on step x 1 min   Lateral walking along railing x4L back and forth , YTB   Monster walk fwd/retro along railing x 2L, YTB   R fwd step up with TKE at top 6'' 2x10   R retro step/step down 6'' x10     Held   Knee flexion stretch step 10 reps x 5 sec  Standing HR/TR 1/2 foam 2x10   Mini lunge dips to 2 AE x 10 with UE support        Neuro:  Airex march x 2 min   Airex tandem x 1 min    AE step up 2x10, R  lead no UE support with L knee drive     Manual Tx:  STM to medial aspect of knee, distal thigh, IASTM with pes anserine and adductor distally.      Informed consent given on manual treatment. Pt given opportunity to cease treatment at any time. Educated pt on expected soreness, possible ecchymosis. Pt voiced understanding  No adverse effects were noted post tx.       Access Code: [email or web address]  URL: [email or web address]  Date: 06/16/2025  Prepared by: Asia Medina     Exercises  - Supine Quad Set (Mirrored)  - 1 x daily - 7 x weekly - 2 sets - 10 reps  - Active Straight Leg Raise with Quad Set  - 1 x daily - 7 x weekly - 2 sets - 10 reps  - Supine Heel Slide with Strap  - 1 x daily - 7 x weekly - 2 sets - 10 reps  - Sidelying Hip Abduction  - 1 x daily - 7 x weekly - 2 sets - 10 reps  - Prone Hip Extension  - 1 x daily - 7 x weekly - 2 sets - 10 reps  - Supine Hip Adduction Isometric with Ball  - 1 x daily - 7 x weekly - 2 sets - 10 reps  - Seated Long Arc Quad  - 1 x daily - 7 x weekly - 2 sets - 10 reps  - Adductor Stretch on Chair  - 1 x daily - 7 x weekly - 2 reps - 30 seconds hold  - Supine Butterfly Groin Stretch  - 1 x daily - 7 x weekly - 2 reps - 30 seconds hold    Assessment:   Pt overall trialed with tape to the R knee with medial glide of the patella to help with inflammation in this region. Pt overall tolerated tape with exercises performed and no pain reported. Recommended to wear for up to 24 hrs if no adverse reactions are occurring with tape donned. Pt understood us eof tape and wear schedule.     Plan:   Continue with strength, ROM of the R knee and use of tape if responding well.     Goals:  Active       PT Problem       Reduce pain at worst to 1-2/10 with all prior activity.        Start:  06/02/25    Expected End:  06/23/25            PT to ambulate with no A.D. FWB for all ambulation.        Start:  06/02/25    Expected End:  06/23/25            Reduce pain at worst to  0/10 with all prior activity.        Start:  06/02/25    Expected End:  07/14/25            Increase knee flexion to 135 degrees and ext to 0 degrees for gait, stairs, and ADL's.       Start:  06/02/25    Expected End:  07/14/25            Pt to increase LE strength to grossly 5/5 for improved gait, stairs, lifting and ADL's.        Start:  06/02/25    Expected End:  07/14/25            Pt to score an increase of 10 points or > on LEFS to display overall increased function.         Start:  06/02/25    Expected End:  07/14/25            Pt to be independent with a HEP for self management.        Start:  06/02/25    Expected End:  07/14/25

## 2025-06-30 ENCOUNTER — TREATMENT (OUTPATIENT)
Dept: PHYSICAL THERAPY | Facility: CLINIC | Age: 70
End: 2025-06-30
Payer: MEDICARE

## 2025-06-30 DIAGNOSIS — S83.241D ACUTE MEDIAL MENISCUS TEAR, RIGHT, SUBSEQUENT ENCOUNTER: ICD-10-CM

## 2025-06-30 DIAGNOSIS — M25.561 ACUTE PAIN OF RIGHT KNEE: ICD-10-CM

## 2025-06-30 DIAGNOSIS — R26.2 DIFFICULTY WALKING: Primary | ICD-10-CM

## 2025-06-30 PROCEDURE — 97140 MANUAL THERAPY 1/> REGIONS: CPT | Mod: GP,CQ

## 2025-06-30 PROCEDURE — 97110 THERAPEUTIC EXERCISES: CPT | Mod: GP,CQ

## 2025-06-30 ASSESSMENT — PAIN SCALES - GENERAL: PAINLEVEL_OUTOF10: 4

## 2025-06-30 ASSESSMENT — PAIN - FUNCTIONAL ASSESSMENT: PAIN_FUNCTIONAL_ASSESSMENT: 0-10

## 2025-06-30 NOTE — PROGRESS NOTES
"Physical Therapy    Physical Therapy Treatment    Patient Name: Raphael Crain  MRN: [id number]  Today's Date: 6/30/2025  Time Calculation  Start Time: 0830  Stop Time: 0915  Time Calculation (min): 45 min     PT Therapeutic Procedures Time Entry  Therapeutic Exercise Time Entry: 30  Neuromuscular Re-Education Time Entry: 5  Manual Therapy Time Entry: 10                 Payor: [employer] MEDICARE / Plan: HUMANA GOLD CHOICE / Product Type: *No Product type* /     Reason for Referral: R knee meniscectomy 5/27/25  Referred By: Justin  General Comment: Visit 9 of 10    Current Problem    Problem List Items Addressed This Visit           ICD-10-CM    Acute medial meniscus tear, right, subsequent encounter S83.241D    Difficulty walking - Primary R26.2    Acute pain of right knee M25.561        Subjective   Pt reports \"burning\" in knee persists at night/AM without change.   This pain continues to wake him up.   Taping to knee last visit decreased pain while it was on. No adverse reactions noted.   He has been icing knee to decrease inflammation, which helps.   F/U with ortho 7/2/25    Pt's goal: to be able to walk dog up to 3 miles, garden, play 9 holes of golf    Compliance with HEP-yes    Precautions  Precautions  Precautions Comment: post op meniscus    Pain  Pain Assessment: 0-10  0-10 (Numeric) Pain Score: 4  Pain Location: Knee  Pain Orientation: Right    Objective   Tenderness to palpation medial patella and jt line, adductor mm    Treatments:  There-ex:  Upright bike x 5 min/ Nustep LE's only x 5 min   Calf incline stretch 2 po x 1 min  HS stretch at step x 1 min ea B LE  Prather's tape R knee with medial glide to patella- pt supine with bolster under knee's for application   HS curl BTB 2x10  TKE iso with yellow ball 2 x 10 (3-5 sec )  Slider hip abd and ext 20x ea  Butterfly stretch x 1 min -held (HEP)  Standing adductor stretch on step x 1 min (HEP)  Lateral walking along railing x4L back and forth , RTB "   Monster walk fwd/retro along railing x 3L, RTB   R fwd step up with TKE at top 6'' 2x10   R retro step/step down 6'' x10     Held   Knee flexion stretch step 10 reps x 5 sec  Standing HR/TR 1/2 foam 2x10   Mini lunge dips to 2 AE x 10 with UE support        Neuro:  Airex tandem x 1 min    AE step up 2x10, R lead no UE support with L knee drive   R SLS  10 sec x 2   Manual Tx:  STM to medial aspect of knee, distal thigh, IASTM with pes anserine and adductor distally.   Prather Taping to R knee medial glide - pt supine with bolster under knee's for application     Informed consent given on manual treatment. Pt given opportunity to cease treatment at any time. Educated pt on expected soreness, possible ecchymosis. Pt voiced understanding  No adverse effects were noted post tx.       Access Code: [email or web address]  URL: [email or web address]  Date: 06/16/2025  Prepared by: Asia Medina     Exercises  - Supine Quad Set (Mirrored)  - 1 x daily - 7 x weekly - 2 sets - 10 reps  - Active Straight Leg Raise with Quad Set  - 1 x daily - 7 x weekly - 2 sets - 10 reps  - Supine Heel Slide with Strap  - 1 x daily - 7 x weekly - 2 sets - 10 reps  - Sidelying Hip Abduction  - 1 x daily - 7 x weekly - 2 sets - 10 reps  - Prone Hip Extension  - 1 x daily - 7 x weekly - 2 sets - 10 reps  - Supine Hip Adduction Isometric with Ball  - 1 x daily - 7 x weekly - 2 sets - 10 reps  - Seated Long Arc Quad  - 1 x daily - 7 x weekly - 2 sets - 10 reps  - Adductor Stretch on Chair  - 1 x daily - 7 x weekly - 2 reps - 30 seconds hold  - Supine Butterfly Groin Stretch  - 1 x daily - 7 x weekly - 2 reps - 30 seconds hold    Assessment:   Pt responds well to McConnel taping to R knee with decreased pain during therex.  Pt voiced medial knee pain with TKE with ball and at top of step with step ups.   Continues to be TTP along medial knee and distal adductors with manual tx. Discussed taping - rec'd to wear for up to 24 hrs as long  as he does not experience adverse reactions and expected soreness from manual tx. Pt voiced understanding.     Plan:   Continue with strength, ROM of the R knee and use of tape if responding well.   McConnel taping for medial glide as long as responds favorably    Goals:  Active       PT Problem       Reduce pain at worst to 1-2/10 with all prior activity.        Start:  06/02/25    Expected End:  06/23/25            PT to ambulate with no A.D. FWB for all ambulation.        Start:  06/02/25    Expected End:  06/23/25            Reduce pain at worst to 0/10 with all prior activity.        Start:  06/02/25    Expected End:  07/14/25            Increase knee flexion to 135 degrees and ext to 0 degrees for gait, stairs, and ADL's.       Start:  06/02/25    Expected End:  07/14/25            Pt to increase LE strength to grossly 5/5 for improved gait, stairs, lifting and ADL's.        Start:  06/02/25    Expected End:  07/14/25            Pt to score an increase of 10 points or > on LEFS to display overall increased function.         Start:  06/02/25    Expected End:  07/14/25            Pt to be independent with a HEP for self management.        Start:  06/02/25    Expected End:  07/14/25

## 2025-07-02 ENCOUNTER — APPOINTMENT (OUTPATIENT)
Dept: ORTHOPEDIC SURGERY | Age: 70
End: 2025-07-02
Payer: MEDICARE

## 2025-07-02 VITALS — HEIGHT: 70 IN | BODY MASS INDEX: 27.2 KG/M2 | WEIGHT: 190 LBS

## 2025-07-02 DIAGNOSIS — S83.241D ACUTE MEDIAL MENISCUS TEAR OF RIGHT KNEE, SUBSEQUENT ENCOUNTER: Primary | ICD-10-CM

## 2025-07-02 PROCEDURE — 1125F AMNT PAIN NOTED PAIN PRSNT: CPT | Performed by: STUDENT IN AN ORGANIZED HEALTH CARE EDUCATION/TRAINING PROGRAM

## 2025-07-02 PROCEDURE — 1160F RVW MEDS BY RX/DR IN RCRD: CPT | Performed by: STUDENT IN AN ORGANIZED HEALTH CARE EDUCATION/TRAINING PROGRAM

## 2025-07-02 PROCEDURE — 3008F BODY MASS INDEX DOCD: CPT | Performed by: STUDENT IN AN ORGANIZED HEALTH CARE EDUCATION/TRAINING PROGRAM

## 2025-07-02 PROCEDURE — 99024 POSTOP FOLLOW-UP VISIT: CPT | Performed by: STUDENT IN AN ORGANIZED HEALTH CARE EDUCATION/TRAINING PROGRAM

## 2025-07-02 PROCEDURE — 1159F MED LIST DOCD IN RCRD: CPT | Performed by: STUDENT IN AN ORGANIZED HEALTH CARE EDUCATION/TRAINING PROGRAM

## 2025-07-02 PROCEDURE — 1036F TOBACCO NON-USER: CPT | Performed by: STUDENT IN AN ORGANIZED HEALTH CARE EDUCATION/TRAINING PROGRAM

## 2025-07-02 PROCEDURE — 4010F ACE/ARB THERAPY RXD/TAKEN: CPT | Performed by: STUDENT IN AN ORGANIZED HEALTH CARE EDUCATION/TRAINING PROGRAM

## 2025-07-02 RX ORDER — DICLOFENAC SODIUM 75 MG/1
75 TABLET, DELAYED RELEASE ORAL 2 TIMES DAILY
Qty: 60 TABLET | Refills: 0 | Status: SHIPPED | OUTPATIENT
Start: 2025-07-02 | End: 2025-08-01

## 2025-07-02 ASSESSMENT — PAIN SCALES - GENERAL: PAINLEVEL_OUTOF10: 4

## 2025-07-02 ASSESSMENT — PAIN - FUNCTIONAL ASSESSMENT: PAIN_FUNCTIONAL_ASSESSMENT: 0-10

## 2025-07-02 ASSESSMENT — PAIN DESCRIPTION - DESCRIPTORS: DESCRIPTORS: ACHING

## 2025-07-02 NOTE — PROGRESS NOTES
PRIMARY CARE PHYSICIAN: Keely Bradley MD    ORTHOPAEDIC POSTOPERATIVE VISIT    ASSESSMENT & PLAN    Impression: 69 y.o. male 5 weeks postop s/p Right knee arthroscopy, partial medial meniscectomy, intra-articular debridement and synovectomy, chondroplasty     Plan:   Overall Raphael is doing well.  He will progressively return to his activities as he tolerates.  I provided him with a prescription for diclofenac as an anti-inflammatory.  He will ice and elevate and continue to focus on low back activities.     I reviewed their postoperative timeline and plan with them. They understand the postoperative precautions and the treatment plan going forward.     Follow-Up: Patient will follow-up in 4 weeks, no x-rays    At the end of the visit, all questions were answered in full. The patient is in agreement with the plan and recommendations. They will call the office with any questions/concerns.    Note dictated with [employer] software. Completed without full typed error editing and sent to avoid delay.    SUBJECTIVE  CHIEF COMPLAINT:   Chief Complaint   Patient presents with    Right Knee - Post-op      HPI: Raphael Crain is a 69 y.o. patient now 5 weeks postop status post Right knee arthroscopy, partial medial meniscectomy, intra-articular debridement and synovectomy, chondroplasty, done 5/27/25. Pt states he feels he is still struggling. Having a lot of difficulty as night time elevating his leg. Pt states he wakes up every night in the middle of the night with pain. Taking meloxicam at night and tylenol.     See previous HPI below:  HPI: Raphael Crain is a 69 y.o. patient now 2 weeks postop status post Right knee arthroscopy, partial medial meniscectomy, intra-articular debridement and synovectomy, chondroplasty, done 5/27/25. Pt states that overall he is doing fair. Reports some persistent swelling. States his pain is worse at night with difficulty sleeping. Currently not taking any  "medications as he has run out of tylenol and Mobic. No concerns at this time.    REVIEW OF SYSTEMS  Constitutional: See HPI for pain assessment, No significant recent weight gain or loss, no fever or chills  Cardiovascular: No chest pain, shortness of breath  Respiratory: No difficulty breathing, no cough  Gastrointestinal: No nausea, vomiting, diarrhea, constipation  Musculoskeletal: Noted in HPI, positive for pain, restricted motion, stiffness  Integumentary: No rashes, easy bruising or skin lesions  Neurological: No headache, no numbness or tingling in extremities  Psychiatric: No mood changes or memory changes. No social issues  Heme/Lymph: No excessive swelling, easy bruising or excessive bleeding  ENT: No hearing changes, no nosebleeds  Eyes: No vision changes    CURRENT MEDICATIONS:   Current Medications[1]     OBJECTIVE    PHYSICAL EXAM      5/27/2025    12:45 PM 5/27/2025    12:50 PM 5/27/2025    12:55 PM 5/27/2025     1:00 PM 5/27/2025     1:15 PM 5/27/2025     1:30 PM 6/11/2025     8:57 AM   Vitals   Systolic 125  128 128 134 144    Diastolic 78  79 79 85 82    BP Location   Left arm       Heart Rate 45 47 46 45 55 56    Temp    36.5 °C (97.7 °F) 36.4 °C (97.5 °F) 36.2 °C (97.2 °F)    Resp 8 9 10 10 14 14    Height       1.778 m (5' 10\")   Weight (lb)       190   BMI       27.26 kg/m2   BSA (m2)       2.06 m2   Visit Report       Report      There is no height or weight on file to calculate BMI.    General: Well-appearing, no acute distress    Skin intact bilateral upper and lower extremities  No erythema  No warmth    Detailed examination of right knee demonstrates:  Surgical incisions well-healed  No erythema or warmth  No ecchymosis  small effusion  Resolving swelling, minimal  Knee range of motion: 0-0-120  Mild to moderate early quadriceps inhibition and trace atrophy  Patella mobility 1+ medial and lateral  Lower extremity motor grossly intact  L4-S1 sensation intact bilaterally  2+ DP/PT pulses " bilaterally  Warm and well-perfused, brisk capillary refill    IMAGING:   No new imaging          [1]   Current Outpatient Medications   Medication Sig Dispense Refill    amLODIPine (Norvasc) 5 mg tablet Take 1 tablet (5 mg) by mouth once daily. 90 tablet 1    aspirin ([place]) 81 mg capsule Take 81 mg by mouth once daily.      cyanocobalamin (Vitamin B-12) 1,000 mcg tablet Take 1 tablet (1,000 mcg) by mouth once daily.      dicyclomine (Bentyl) 20 mg tablet Take 1 tablet (20 mg) by mouth 4 times a day as needed (For abdominal pain, bloating, and cramping). Do not take immediately prior to driving as this medication may cause drowsiness. 120 tablet 11    gabapentin (Neurontin) 300 mg capsule Take 1 capsule (300 mg) by mouth 3 times a day. (Patient not taking: Reported on 4/14/2025) 90 capsule 3    levothyroxine (Synthroid, Levoxyl) 150 mcg tablet Take 1 tablet (150 mcg) by mouth once daily. 90 tablet 1    losartan (Cozaar) 100 mg tablet Take 1 tablet (100 mg) by mouth once daily. 90 tablet 1    meloxicam (Mobic) 15 mg tablet Take 1 tablet (15 mg) by mouth once daily. 30 tablet 1    metFORMIN (Glucophage) 1,000 mg tablet Take 1 tablet (1,000 mg) by mouth once daily with breakfast. 100 tablet 3    metoprolol succinate XL (Toprol-XL) 50 mg 24 hr tablet Take 1 tablet (50 mg) by mouth once daily. 90 tablet 3    omeprazole (PriLOSEC) 40 mg DR capsule Take 1 capsule (40 mg) by mouth once daily. 90 capsule 1    pravastatin (Pravachol) 40 mg tablet Take 1 tablet (40 mg) by mouth once daily. 90 tablet 1     No current facility-administered medications for this visit.

## 2025-07-03 ENCOUNTER — TREATMENT (OUTPATIENT)
Dept: PHYSICAL THERAPY | Facility: CLINIC | Age: 70
End: 2025-07-03
Payer: MEDICARE

## 2025-07-03 DIAGNOSIS — M25.561 ACUTE PAIN OF RIGHT KNEE: ICD-10-CM

## 2025-07-03 DIAGNOSIS — S83.241D ACUTE MEDIAL MENISCUS TEAR, RIGHT, SUBSEQUENT ENCOUNTER: ICD-10-CM

## 2025-07-03 DIAGNOSIS — R26.2 DIFFICULTY WALKING: Primary | ICD-10-CM

## 2025-07-03 PROCEDURE — 97112 NEUROMUSCULAR REEDUCATION: CPT | Mod: GP | Performed by: PHYSICAL THERAPIST

## 2025-07-03 PROCEDURE — 97110 THERAPEUTIC EXERCISES: CPT | Mod: GP | Performed by: PHYSICAL THERAPIST

## 2025-07-03 ASSESSMENT — PAIN - FUNCTIONAL ASSESSMENT: PAIN_FUNCTIONAL_ASSESSMENT: 0-10

## 2025-07-03 ASSESSMENT — PAIN DESCRIPTION - DESCRIPTORS: DESCRIPTORS: ACHING

## 2025-07-03 ASSESSMENT — PAIN SCALES - GENERAL: PAINLEVEL_OUTOF10: 3

## 2025-07-03 NOTE — PROGRESS NOTES
Physical Therapy    Physical Therapy Treatment/Recheck     Patient Name: Raphael Crain  MRN: [id number]  Today's Date: 7/3/2025  Time Calculation  Start Time: 1450  Stop Time: 1530  Time Calculation (min): 40 min     PT Therapeutic Procedures Time Entry  Therapeutic Exercise Time Entry: 35  Neuromuscular Re-Education Time Entry: 5                 Payor: [employer] MEDICARE / Plan: [employer] GOLD CHOICE / Product Type: *No Product type* /     Reason for Referral: R knee meniscectomy 5/27/25  Referred By: Justin  General Comment: Visit 10 of 10    Current Problem    Problem List Items Addressed This Visit           ICD-10-CM    Acute medial meniscus tear, right, subsequent encounter S83.241D    Difficulty walking - Primary R26.2    Acute pain of right knee M25.561        Subjective   Pt notes he had a follow up with ortho yesterday. Was recommended to continue with therapy and focus on return to normal activity.   Compliance with HEP-yes    Precautions  Precautions  Precautions Comment: post op meniscus    Pain  Pain Assessment: 0-10  0-10 (Numeric) Pain Score: 3  Pain Location: Knee  Pain Orientation: Right  Pain Descriptors: Aching    Objective   Lower Extremity Strength:  MMT 5/5 max  RIGHT LEFT   Hip Flexion 4+ 5   Hip Extension 5 5   Hip Abduction 5 5   Hip Adduction 5 5   Knee Extension 4+ 5   Knee Flexion 4+ 5   Ankle DF 5 5   Ankle PF 5 5     Lower Extremity ROM: WNL unless documented below:  ROM in Degrees  RIGHT LEFT   Knee Extension 0 0   Knee Flexion 123 135     Joint mobility: Hypomobile R patella all ranges  Palpation: No pain with palpation except medial knee.     Other Measures  Lower Extremity Funtional Score (LEFS): 65     Treatments:  There-ex:  Upright bike x 5 min/ Nustep LE's only x 5 min   Calf incline stretch 2 po x 1 min  HS stretch at step x 1 min ea B LE  Prather's tape R knee with medial glide to patella- pt supine with bolster under knee's for application   HS curl BTB 2x10  TKE iso with  yellow ball 2 x 10 (3-5 sec )  Slider hip abd and ext 20x ea  Lateral walking along railing x4L back and forth , RTB   Monster walk fwd/retro along railing x 3L, RTB   R fwd step up with TKE at top 6'' 2x10   R retro step/step down 6'' x10   Objective measures today     Neuro:  Airex tandem x 1 min    AE step up 2x10, R lead no UE support with L knee drive   R SLS  10 sec x 2     Manual Tx:-held   STM to medial aspect of knee, distal thigh, IASTM with pes anserine and adductor distally.   Prather Taping to R knee medial glide - pt supine with bolster under knee's for application     Informed consent given on manual treatment. Pt given opportunity to cease treatment at any time. Educated pt on expected soreness, possible ecchymosis. Pt voiced understanding  No adverse effects were noted post tx.       Access Code: [email or web address]  URL: [email or web address]  Date: 06/16/2025  Prepared by: Asia Medina     Exercises  - Supine Quad Set (Mirrored)  - 1 x daily - 7 x weekly - 2 sets - 10 reps  - Active Straight Leg Raise with Quad Set  - 1 x daily - 7 x weekly - 2 sets - 10 reps  - Supine Heel Slide with Strap  - 1 x daily - 7 x weekly - 2 sets - 10 reps  - Sidelying Hip Abduction  - 1 x daily - 7 x weekly - 2 sets - 10 reps  - Prone Hip Extension  - 1 x daily - 7 x weekly - 2 sets - 10 reps  - Supine Hip Adduction Isometric with Ball  - 1 x daily - 7 x weekly - 2 sets - 10 reps  - Seated Long Arc Quad  - 1 x daily - 7 x weekly - 2 sets - 10 reps  - Adductor Stretch on Chair  - 1 x daily - 7 x weekly - 2 reps - 30 seconds hold  - Supine Butterfly Groin Stretch  - 1 x daily - 7 x weekly - 2 reps - 30 seconds hold    Assessment:   Pt overall has improved thus far with his knee but still some pain is noted medially along the patella. Pt overall improved with ROM and strength as well as gait/function. Pt would benefit from continued PT tx to progress towards full goal attainment. Pt has met approx % of  his LTG's a this time and has a good prognosis going forward.     Plan:  OP PT Plan  Treatment/Interventions: Taping techniques, Therapeutic activities, Therapeutic exercises, Neuromuscular re-education, Manual therapy  PT Plan: Skilled PT  PT Frequency: 2 times per week  Duration: 4 weeks  Rehab Potential: Good  Plan of Care Agreement: Patient    Goals:  Active       PT Problem       Reduce pain at worst to 1-2/10 with all prior activity.  (Progressing)       Start:  06/02/25    Expected End:  06/23/25            PT to ambulate with no A.D. FWB for all ambulation.  (Met)       Start:  06/02/25    Expected End:  06/23/25    Resolved:  07/03/25         Reduce pain at worst to 0/10 with all prior activity.  (Progressing)       Start:  06/02/25    Expected End:  07/14/25            Increase knee flexion to 135 degrees and ext to 0 degrees for gait, stairs, and ADL's. (Progressing)       Start:  06/02/25    Expected End:  07/14/25            Pt to increase LE strength to grossly 5/5 for improved gait, stairs, lifting and ADL's.  (Progressing)       Start:  06/02/25    Expected End:  07/14/25            Pt to score an increase of 10 points or > on LEFS to display overall increased function.   (Met)       Start:  06/02/25    Expected End:  07/14/25    Resolved:  07/03/25         Pt to be independent with a HEP for self management.  (Progressing)       Start:  06/02/25    Expected End:  07/14/25

## 2025-07-09 ENCOUNTER — TREATMENT (OUTPATIENT)
Dept: PHYSICAL THERAPY | Facility: CLINIC | Age: 70
End: 2025-07-09
Payer: MEDICARE

## 2025-07-09 DIAGNOSIS — R26.2 DIFFICULTY WALKING: Primary | ICD-10-CM

## 2025-07-09 DIAGNOSIS — M25.561 ACUTE PAIN OF RIGHT KNEE: ICD-10-CM

## 2025-07-09 DIAGNOSIS — S83.241D ACUTE MEDIAL MENISCUS TEAR, RIGHT, SUBSEQUENT ENCOUNTER: ICD-10-CM

## 2025-07-09 PROCEDURE — 97110 THERAPEUTIC EXERCISES: CPT | Mod: GP,CQ

## 2025-07-09 PROCEDURE — 97140 MANUAL THERAPY 1/> REGIONS: CPT | Mod: GP,CQ

## 2025-07-09 ASSESSMENT — PAIN DESCRIPTION - DESCRIPTORS: DESCRIPTORS: ACHING

## 2025-07-09 ASSESSMENT — PAIN - FUNCTIONAL ASSESSMENT: PAIN_FUNCTIONAL_ASSESSMENT: 0-10

## 2025-07-09 ASSESSMENT — PAIN SCALES - GENERAL: PAINLEVEL_OUTOF10: 3

## 2025-07-09 NOTE — PROGRESS NOTES
"Physical Therapy    Physical Therapy Treatment/Recheck     Patient Name: Raphael Crain  MRN: [id number]  Today's Date: 7/9/2025  Time Calculation  Start Time: 0955  Stop Time: 1045  Time Calculation (min): 50 min     PT Therapeutic Procedures Time Entry  Therapeutic Exercise Time Entry: 35  Neuromuscular Re-Education Time Entry: 5  Manual Therapy Time Entry: 10                 Payor: HU[employer]NA MEDICARE / Plan: HUMANA GOLD CHOICE / Product Type: *No Product type* /     Reason for Referral: R knee meniscectomy 5/27/25  Referred By: Justin  General Comment: Visit 11 of 15    Current Problem    Problem List Items Addressed This Visit           ICD-10-CM    Acute medial meniscus tear, right, subsequent encounter S83.241D    Difficulty walking - Primary R26.2    Acute pain of right knee M25.561        Subjective   Pt reports medial knee was in \"agony\" after leg press last session. Pain was back to baseline a few days later.  He bought a hinged brace for knee to do yard work and it feels better, more stable.  Pt going on work trip next week so will not be back to PT until 7/24  Compliance with HEP-yes    Precautions  Precautions  Precautions Comment: post op meniscus    Pain  Pain Assessment: 0-10  0-10 (Numeric) Pain Score: 3  Pain Location: Knee  Pain Orientation: Right  Pain Descriptors: Aching    Objective   No measures today    Treatments:  There-ex:  Upright bike x 5 min/ Nustep LE's only x 5 min   Calf incline stretch 2 po x 1 min  R HS stretch at step x 1 min ea B LE  R Knee flex stretch at steps x 1 min   Prather's tape R knee with medial glide to patella- pt supine with bolster under knee's for application   HS curl BTB 2x10  TKE iso with yellow ball 2 x 10 (3-5 sec )  Slider hip abd and ext 20x ea  Lateral walking along railing x4L back and forth , RTB   Monster walk fwd/retro along railing x 3L, RTB   R fwd step up with TKE at top 8'' 2x10   R retro step/step down 6'' 2x10     Leg press (seat 7)  BL 90# 2x10 "   Leg press (R) 30# 2x10 increase weight next visit     Neuro:  Airex tandem x 1 min    AE step up 2x10, R lead no UE support with L knee drive   R SLS  10 sec x 2     Manual Tx:-held   STM to medial aspect of knee, distal thigh, IASTM with pes anserine and adductor distally.   Yamilka Taping to R knee medial glide - pt supine with bolster under knee's for application     Informed consent given on manual treatment. Pt given opportunity to cease treatment at any time. Educated pt on expected soreness, possible ecchymosis. Pt voiced understanding  No adverse effects were noted post tx.       Access Code: [email or web address]  URL: [email or web address]  Date: 06/16/2025  Prepared by: Asia Medina     Exercises  - Supine Quad Set (Mirrored)  - 1 x daily - 7 x weekly - 2 sets - 10 reps  - Active Straight Leg Raise with Quad Set  - 1 x daily - 7 x weekly - 2 sets - 10 reps  - Supine Heel Slide with Strap  - 1 x daily - 7 x weekly - 2 sets - 10 reps  - Sidelying Hip Abduction  - 1 x daily - 7 x weekly - 2 sets - 10 reps  - Prone Hip Extension  - 1 x daily - 7 x weekly - 2 sets - 10 reps  - Supine Hip Adduction Isometric with Ball  - 1 x daily - 7 x weekly - 2 sets - 10 reps  - Seated Long Arc Quad  - 1 x daily - 7 x weekly - 2 sets - 10 reps  - Adductor Stretch on Chair  - 1 x daily - 7 x weekly - 2 reps - 30 seconds hold  - Supine Butterfly Groin Stretch  - 1 x daily - 7 x weekly - 2 reps - 30 seconds hold    Assessment:   Pt tolerated tx well.   Reduced weight on leg press and pt tolerated well. However, he did voice some medial knee discomfort following while performing AE tandem.   TTP along medial and posterior knee during manual tx. Tolerated well, voicing relief.  Plan:   Monitor response to leg press, modify if necessary   Goals:  Active       PT Problem       Reduce pain at worst to 1-2/10 with all prior activity.  (Progressing)       Start:  06/02/25    Expected End:  06/23/25            PT to  ambulate with no A.D. FWB for all ambulation.  (Met)       Start:  06/02/25    Expected End:  06/23/25    Resolved:  07/03/25         Reduce pain at worst to 0/10 with all prior activity.  (Progressing)       Start:  06/02/25    Expected End:  07/14/25            Increase knee flexion to 135 degrees and ext to 0 degrees for gait, stairs, and ADL's. (Progressing)       Start:  06/02/25    Expected End:  07/14/25            Pt to increase LE strength to grossly 5/5 for improved gait, stairs, lifting and ADL's.  (Progressing)       Start:  06/02/25    Expected End:  07/14/25            Pt to score an increase of 10 points or > on LEFS to display overall increased function.   (Met)       Start:  06/02/25    Expected End:  07/14/25    Resolved:  07/03/25         Pt to be independent with a HEP for self management.  (Progressing)       Start:  06/02/25    Expected End:  07/14/25

## 2025-07-22 ENCOUNTER — APPOINTMENT (OUTPATIENT)
Dept: GASTROENTEROLOGY | Facility: EXTERNAL LOCATION | Age: 70
End: 2025-07-22
Payer: MEDICARE

## 2025-07-22 DIAGNOSIS — D12.2 BENIGN NEOPLASM OF ASCENDING COLON: Primary | ICD-10-CM

## 2025-07-22 DIAGNOSIS — Z12.11 COLON CANCER SCREENING: ICD-10-CM

## 2025-07-22 PROCEDURE — 88305 TISSUE EXAM BY PATHOLOGIST: CPT | Performed by: PATHOLOGY

## 2025-07-22 PROCEDURE — 4010F ACE/ARB THERAPY RXD/TAKEN: CPT | Performed by: INTERNAL MEDICINE

## 2025-07-22 PROCEDURE — 88305 TISSUE EXAM BY PATHOLOGIST: CPT

## 2025-07-22 PROCEDURE — 45385 COLONOSCOPY W/LESION REMOVAL: CPT | Performed by: INTERNAL MEDICINE

## 2025-07-23 ENCOUNTER — LAB REQUISITION (OUTPATIENT)
Dept: LAB | Facility: HOSPITAL | Age: 70
End: 2025-07-23
Payer: MEDICARE

## 2025-07-23 DIAGNOSIS — Z12.11 ENCOUNTER FOR SCREENING FOR MALIGNANT NEOPLASM OF COLON: ICD-10-CM

## 2025-07-24 ENCOUNTER — TREATMENT (OUTPATIENT)
Dept: PHYSICAL THERAPY | Facility: CLINIC | Age: 70
End: 2025-07-24
Payer: MEDICARE

## 2025-07-24 DIAGNOSIS — S83.241D ACUTE MEDIAL MENISCUS TEAR, RIGHT, SUBSEQUENT ENCOUNTER: ICD-10-CM

## 2025-07-24 DIAGNOSIS — M25.561 ACUTE PAIN OF RIGHT KNEE: ICD-10-CM

## 2025-07-24 DIAGNOSIS — R26.2 DIFFICULTY WALKING: Primary | ICD-10-CM

## 2025-07-24 PROCEDURE — 97112 NEUROMUSCULAR REEDUCATION: CPT | Mod: GP | Performed by: PHYSICAL THERAPIST

## 2025-07-24 PROCEDURE — 97110 THERAPEUTIC EXERCISES: CPT | Mod: GP | Performed by: PHYSICAL THERAPIST

## 2025-07-24 NOTE — PROGRESS NOTES
Physical Therapy    Physical Therapy Treatment    Patient Name: Raphael Crain  MRN: [id number]  Today's Date: 7/24/2025  Visit #12  Time Calculation  Start Time: 1445  Stop Time: 1525  Time Calculation (min): 40 min     PT Therapeutic Procedures Time Entry  Therapeutic Exercise Time Entry: 28  Neuromuscular Re-Education Time Entry: 12        Payor: HUMANA MEDICARE / Plan: HUMANA GOLD CHOICE / Product Type: *No Product type* /   Referred by:Suhail Anderson  Current Problem  Problem List Items Addressed This Visit           ICD-10-CM    Acute medial meniscus tear, right, subsequent encounter S83.241D    Difficulty walking - Primary R26.2    Acute pain of right knee M25.561        Subjective   Pt reports walking through the airport was a challenge.  HE was walking on concrete a lot too.  Once he got back he was pretty sore but feels like he is improving.  Nights are still a problem.   Current Pain: Better  Pt has been compliant with HEP Yes      Objective  No objective measures assessed this visit    Assessment:  Pt is progressing as expected towards goals. Began working on more stabilization this visit  This session exercises were progressed (p) or added (NEW) as documented in the treatment section.  The pt required min to mod cues and demonstration to complete exercises with proper form.    Pt responded to all activities without adverse effects.    Pt continues to lack proprioception and strength necessary for the completion of baseline ADLs without pain.  Pt will benefit from further therapy to continue to progress towards meeting functional and impairment goals.    Plan:  Continue to progress treatment to improve strength, range of motion, joint mobility, pain, and flexibility impairments which are impacting patient's function.    Treatments:  Visit # 12  There-ex:  Upright bike x 5 min/ Nustep LE's only x 5 min   Calf incline stretch 2 po x 1 min  R HS stretch at step x 1 min ea B LE  R Knee flex stretch at steps  x 1 min   Prather's tape R knee with medial glide to patella- pt supine with bolster under knee's for application   HS curl BTB 2x10  TKE iso with yellow ball 2 x 10 (3-5 sec )  Standing hip ext and abd RTB x 20  Lateral walking along railing x4L back and forth , RTB   Monster walk fwd/retro along railing x 3L, RTB   HELD  R fwd step up with TKE at top 8'' 2x10   R retro step/step down 6'' 2x10    Leg press (seat 7)  BL 90# 2x10   Leg press (R) 30# 2x10 increase weight next visit      Neuro:  Airex tandem x 1 min    AE step up 2x10, R lead no UE support with L knee drive   R SLS  10 sec x 2   Cone tap on step with UE support  Bosu lunges x 20 ea   balance board 2 directions 1 min ea  Manual Tx:-held   STM to medial aspect of knee, distal thigh, IASTM with pes anserine and adductor distally.   Prather Taping to R knee medial glide - pt supine with bolster under knee's for application      Informed consent given on manual treatment. Pt given opportunity to cease treatment at any time. Educated pt on expected soreness, possible ecchymosis. Pt voiced understanding  No adverse effects were noted post tx.       Access Code: [email or web address]  URL: [email or web address]  Date: 06/16/2025  Prepared by: Asia Medina     Exercises  - Supine Quad Set (Mirrored)  - 1 x daily - 7 x weekly - 2 sets - 10 reps  - Active Straight Leg Raise with Quad Set  - 1 x daily - 7 x weekly - 2 sets - 10 reps  - Supine Heel Slide with Strap  - 1 x daily - 7 x weekly - 2 sets - 10 reps  - Sidelying Hip Abduction  - 1 x daily - 7 x weekly - 2 sets - 10 reps  - Prone Hip Extension  - 1 x daily - 7 x weekly - 2 sets - 10 reps  - Supine Hip Adduction Isometric with Ball  - 1 x daily - 7 x weekly - 2 sets - 10 reps  - Seated Long Arc Quad  - 1 x daily - 7 x weekly - 2 sets - 10 reps  - Adductor Stretch on Chair  - 1 x daily - 7 x weekly - 2 reps - 30 seconds hold  - Supine Butterfly Groin Stretch  - 1 x daily - 7 x weekly - 2 reps  - 30 seconds hold  Goals:  Active       PT Problem       Reduce pain at worst to 1-2/10 with all prior activity.  (Progressing)       Start:  06/02/25    Expected End:  06/23/25            PT to ambulate with no A.D. FWB for all ambulation.  (Met)       Start:  06/02/25    Expected End:  06/23/25    Resolved:  07/03/25         Reduce pain at worst to 0/10 with all prior activity.  (Progressing)       Start:  06/02/25    Expected End:  07/14/25            Increase knee flexion to 135 degrees and ext to 0 degrees for gait, stairs, and ADL's. (Progressing)       Start:  06/02/25    Expected End:  07/14/25            Pt to increase LE strength to grossly 5/5 for improved gait, stairs, lifting and ADL's.  (Progressing)       Start:  06/02/25    Expected End:  07/14/25            Pt to score an increase of 10 points or > on LEFS to display overall increased function.   (Met)       Start:  06/02/25    Expected End:  07/14/25    Resolved:  07/03/25         Pt to be independent with a HEP for self management.  (Progressing)       Start:  06/02/25    Expected End:  07/14/25

## 2025-07-29 ENCOUNTER — TREATMENT (OUTPATIENT)
Dept: PHYSICAL THERAPY | Facility: CLINIC | Age: 70
End: 2025-07-29
Payer: MEDICARE

## 2025-07-29 DIAGNOSIS — M25.561 ACUTE PAIN OF RIGHT KNEE: ICD-10-CM

## 2025-07-29 DIAGNOSIS — S83.241D ACUTE MEDIAL MENISCUS TEAR, RIGHT, SUBSEQUENT ENCOUNTER: ICD-10-CM

## 2025-07-29 DIAGNOSIS — R26.2 DIFFICULTY WALKING: Primary | ICD-10-CM

## 2025-07-29 LAB
LABORATORY COMMENT REPORT: NORMAL
PATH REPORT.FINAL DX SPEC: NORMAL
PATH REPORT.GROSS SPEC: NORMAL
PATH REPORT.RELEVANT HX SPEC: NORMAL
PATH REPORT.TOTAL CANCER: NORMAL

## 2025-07-29 PROCEDURE — 97112 NEUROMUSCULAR REEDUCATION: CPT | Mod: GP | Performed by: PHYSICAL THERAPIST

## 2025-07-29 PROCEDURE — 97110 THERAPEUTIC EXERCISES: CPT | Mod: GP | Performed by: PHYSICAL THERAPIST

## 2025-07-29 ASSESSMENT — PAIN DESCRIPTION - DESCRIPTORS: DESCRIPTORS: TIGHTNESS

## 2025-07-29 ASSESSMENT — PAIN SCALES - GENERAL: PAINLEVEL_OUTOF10: 2

## 2025-07-29 ASSESSMENT — PAIN - FUNCTIONAL ASSESSMENT: PAIN_FUNCTIONAL_ASSESSMENT: 0-10

## 2025-07-29 NOTE — PROGRESS NOTES
Physical Therapy    Physical Therapy Treatment    Patient Name: Raphael Crain  MRN: [id number]  Today's Date: 7/29/2025  Time Calculation  Start Time: 1230  Stop Time: 1315  Time Calculation (min): 45 min     PT Therapeutic Procedures Time Entry  Therapeutic Exercise Time Entry: 30  Neuromuscular Re-Education Time Entry: 14                 Payor: [employer]ICARE / Plan: HUMANA GOLD CHOICE / Product Type: *No Product type* /     Reason for Referral: R knee meniscectomy 5/27/25  Referred By: Justin  General Comment: Visit 13 of 15    Current Problem    Problem List Items Addressed This Visit           ICD-10-CM    Acute medial meniscus tear, right, subsequent encounter S83.241D    Difficulty walking - Primary R26.2    Acute pain of right knee M25.561      Subjective   Pt overall notes he feels he is doing better today with mostly just stiffness noted. Still pain in the evenings.   Compliance with HEP-yes    Precautions  Precautions  Precautions Comment: post op meniscus    Pain  Pain Assessment: 0-10  0-10 (Numeric) Pain Score: 2  Pain Location: Knee  Pain Orientation: Right  Pain Descriptors: Tightness    Objective   No measures today     Treatments:  There-ex:  Upright bike x 5 min/ Nustep LE's only x 5 min   Calf incline stretch 2 po x 1 min  R HS stretch at step x 1 min ea B LE  R Knee flex stretch at steps x 1 min   HS curl BTB 2x10  TKE iso with yellow ball 2 x 10 (3-5 sec )  Standing hip ext, flex and abd RTB x 20  Lateral walking along railing x4L back and forth , RTB   Monster walk fwd/retro along railing x 3L, RTB   R retro step/step down 6'' 2x10    Leg press (seat 7)  BL 90# 2x10   Leg press (R) 30# 2x10 increase weight next visit      Neuro:  Airex tandem x 1 min    AE step up 2x10, R lead no UE support with L knee drive   R SLS  10 sec x 2   Cone tap on step with UE support  Bosu lunges x 20 ea   balance board 2 directions 1 min ea    Manual Tx:-held   STM to medial aspect of knee, distal thigh, IASTM  with pes anserine and adductor distally.   Yamilka Taping to R knee medial glide - pt supine with bolster under knee's for application      Informed consent given on manual treatment. Pt given opportunity to cease treatment at any time. Educated pt on expected soreness, possible ecchymosis. Pt voiced understanding  No adverse effects were noted post tx.       Access Code: [email or web address]  URL: [email or web address]  Date: 06/16/2025  Prepared by: Asia Medina     Exercises  - Supine Quad Set (Mirrored)  - 1 x daily - 7 x weekly - 2 sets - 10 reps  - Active Straight Leg Raise with Quad Set  - 1 x daily - 7 x weekly - 2 sets - 10 reps  - Supine Heel Slide with Strap  - 1 x daily - 7 x weekly - 2 sets - 10 reps  - Sidelying Hip Abduction  - 1 x daily - 7 x weekly - 2 sets - 10 reps  - Prone Hip Extension  - 1 x daily - 7 x weekly - 2 sets - 10 reps  - Supine Hip Adduction Isometric with Ball  - 1 x daily - 7 x weekly - 2 sets - 10 reps  - Seated Long Arc Quad  - 1 x daily - 7 x weekly - 2 sets - 10 reps  - Adductor Stretch on Chair  - 1 x daily - 7 x weekly - 2 reps - 30 seconds hold  - Supine Butterfly Groin Stretch  - 1 x daily - 7 x weekly - 2 reps - 30 seconds hold    Assessment:   Pt overall tolerated treatment well today and progressed with strength and doing well with ROM in the knee. Pt improving towards goals.      Plan:   2 more visits then possible discharge to [facility]     Goals:  Active       PT Problem       Reduce pain at worst to 1-2/10 with all prior activity.  (Met)       Start:  06/02/25    Expected End:  06/23/25    Resolved:  07/29/25         PT to ambulate with no A.D. FWB for all ambulation.  (Met)       Start:  06/02/25    Expected End:  06/23/25    Resolved:  07/03/25         Reduce pain at worst to 0/10 with all prior activity.  (Progressing)       Start:  06/02/25    Expected End:  07/14/25            Increase knee flexion to 135 degrees and ext to 0 degrees for gait, stairs,  and ADL's. (Progressing)       Start:  06/02/25    Expected End:  07/14/25            Pt to increase LE strength to grossly 5/5 for improved gait, stairs, lifting and ADL's.  (Progressing)       Start:  06/02/25    Expected End:  07/14/25            Pt to score an increase of 10 points or > on LEFS to display overall increased function.   (Met)       Start:  06/02/25    Expected End:  07/14/25    Resolved:  07/03/25         Pt to be independent with a HEP for self management.  (Progressing)       Start:  06/02/25    Expected End:  07/14/25

## 2025-07-31 DIAGNOSIS — S83.241D ACUTE MEDIAL MENISCUS TEAR OF RIGHT KNEE, SUBSEQUENT ENCOUNTER: ICD-10-CM

## 2025-07-31 RX ORDER — DICLOFENAC SODIUM 75 MG/1
TABLET, DELAYED RELEASE ORAL
Qty: 60 TABLET | Refills: 0 | Status: SHIPPED | OUTPATIENT
Start: 2025-07-31

## 2025-08-05 ENCOUNTER — TREATMENT (OUTPATIENT)
Dept: PHYSICAL THERAPY | Facility: CLINIC | Age: 70
End: 2025-08-05
Payer: MEDICARE

## 2025-08-05 DIAGNOSIS — S83.241D ACUTE MEDIAL MENISCUS TEAR, RIGHT, SUBSEQUENT ENCOUNTER: ICD-10-CM

## 2025-08-05 DIAGNOSIS — M25.561 ACUTE PAIN OF RIGHT KNEE: ICD-10-CM

## 2025-08-05 DIAGNOSIS — R26.2 DIFFICULTY WALKING: Primary | ICD-10-CM

## 2025-08-05 PROCEDURE — 97112 NEUROMUSCULAR REEDUCATION: CPT | Mod: GP | Performed by: PHYSICAL THERAPIST

## 2025-08-05 PROCEDURE — 97110 THERAPEUTIC EXERCISES: CPT | Mod: GP | Performed by: PHYSICAL THERAPIST

## 2025-08-05 ASSESSMENT — PAIN SCALES - GENERAL: PAINLEVEL_OUTOF10: 3

## 2025-08-05 ASSESSMENT — PAIN - FUNCTIONAL ASSESSMENT: PAIN_FUNCTIONAL_ASSESSMENT: 0-10

## 2025-08-05 ASSESSMENT — PAIN DESCRIPTION - DESCRIPTORS: DESCRIPTORS: TIGHTNESS

## 2025-08-05 NOTE — PROGRESS NOTES
Physical Therapy    Physical Therapy Treatment    Patient Name: Raphael Crain  MRN: [id number]  Today's Date: 8/5/2025  Time Calculation  Start Time: 0915  Stop Time: 1000  Time Calculation (min): 45 min     PT Therapeutic Procedures Time Entry  Therapeutic Exercise Time Entry: 30  Neuromuscular Re-Education Time Entry: 15                 Payor: HUMANA MEDICARE / Plan: HUMANA GOLD CHOICE / Product Type: *No Product type* /     Reason for Referral: R knee meniscectomy 5/27/25  Referred By: Justin  General Comment: Visit 14 of 15    Current Problem    Problem List Items Addressed This Visit           ICD-10-CM    Acute medial meniscus tear, right, subsequent encounter S83.241D    Difficulty walking - Primary R26.2    Acute pain of right knee M25.561      Subjective   Pt notes he may have overdone it some on the weekend as he feels his pain did increase some.   Compliance with HEP-yes    Precautions  Precautions  Precautions Comment: post op meniscus    Pain  Pain Assessment: 0-10  0-10 (Numeric) Pain Score: 3  Pain Location: Knee  Pain Orientation: Right  Pain Descriptors: Tightness      Objective   Lower Extremity Strength:  MMT 5/5 max  RIGHT LEFT   Hip Flexion 4+ 5   Hip Extension 5 5   Hip Abduction 5 5   Hip Adduction 5 5   Knee Extension 5 5   Knee Flexion 5 5   Ankle DF 5 5   Ankle PF 5 5     Lower Extremity ROM: WNL unless documented below:  ROM in Degrees  RIGHT LEFT   Knee Extension 0 0   Knee Flexion 125 135       Treatments:  There-ex:  Upright bike x 5 min/ Nustep LE's only x 5 min   Calf incline stretch 2 po x 1 min  R HS stretch at step x 1 min ea B LE  R Knee flex stretch at steps x 1 min   HS curl BTB 2x10-add to HEP  HS chair scoots 20 ft x 4    TKE iso with yellow ball 2 x 10 (3-5 sec )  Standing hip ext, flex and abd GTB x 20  Lateral walking along railing x4L back and forth , GTB   Monster walk fwd/retro along railing x 3L, GTB   R retro step/step down 6'' 2x10   Leg press (seat 7)  BL 90# 2x10    Leg press (R) 30# 2x10 increase weight next visit      Neuro:  Airex tandem x 1 min    Bosu step up 2x10, R lead no UE support with L knee drive  R SLS  10 sec x 2   Cone tap on step with UE support  Bosu lunges x 20 ea  Balance board 2 directions 1.5 min ea    Manual Tx:-held   STM to medial aspect of knee, distal thigh, IASTM with pes anserine and adductor distally.   Prather Taping to R knee medial glide - pt supine with bolster under knee's for application      Informed consent given on manual treatment. Pt given opportunity to cease treatment at any time. Educated pt on expected soreness, possible ecchymosis. Pt voiced understanding  No adverse effects were noted post tx.       Access Code: [email or web address]  URL: [email or web address]  Date: 06/16/2025  Prepared by: Asia Medina     Exercises  - Supine Quad Set (Mirrored)  - 1 x daily - 7 x weekly - 2 sets - 10 reps  - Active Straight Leg Raise with Quad Set  - 1 x daily - 7 x weekly - 2 sets - 10 reps  - Supine Heel Slide with Strap  - 1 x daily - 7 x weekly - 2 sets - 10 reps  - Sidelying Hip Abduction  - 1 x daily - 7 x weekly - 2 sets - 10 reps  - Prone Hip Extension  - 1 x daily - 7 x weekly - 2 sets - 10 reps  - Supine Hip Adduction Isometric with Ball  - 1 x daily - 7 x weekly - 2 sets - 10 reps  - Seated Long Arc Quad  - 1 x daily - 7 x weekly - 2 sets - 10 reps  - Adductor Stretch on Chair  - 1 x daily - 7 x weekly - 2 reps - 30 seconds hold  - Supine Butterfly Groin Stretch  - 1 x daily - 7 x weekly - 2 reps - 30 seconds hold      Assessment:   Pt overall doing well with some pain remaining yet in the knee that can increase with activity. Overall improved well with strength and ROM in the knee as well.     Plan:   1 more visit then discharge to ongoing HEP.     Goals:  Active       PT Problem       Reduce pain at worst to 1-2/10 with all prior activity.  (Met)       Start:  06/02/25    Expected End:  06/23/25    Resolved:   07/29/25         PT to ambulate with no A.D. FWB for all ambulation.  (Met)       Start:  06/02/25    Expected End:  06/23/25    Resolved:  07/03/25         Reduce pain at worst to 0/10 with all prior activity.  (Progressing)       Start:  06/02/25    Expected End:  07/14/25            Increase knee flexion to 135 degrees and ext to 0 degrees for gait, stairs, and ADL's. (Progressing)       Start:  06/02/25    Expected End:  07/14/25            Pt to increase LE strength to grossly 5/5 for improved gait, stairs, lifting and ADL's.  (Progressing)       Start:  06/02/25    Expected End:  07/14/25            Pt to score an increase of 10 points or > on LEFS to display overall increased function.   (Met)       Start:  06/02/25    Expected End:  07/14/25    Resolved:  07/03/25         Pt to be independent with a HEP for self management.  (Progressing)       Start:  06/02/25    Expected End:  07/14/25

## 2025-08-06 ENCOUNTER — APPOINTMENT (OUTPATIENT)
Dept: ORTHOPEDIC SURGERY | Age: 70
End: 2025-08-06
Payer: MEDICARE

## 2025-08-06 DIAGNOSIS — M94.261 CHONDROMALACIA, RIGHT KNEE: Primary | ICD-10-CM

## 2025-08-06 PROCEDURE — 1159F MED LIST DOCD IN RCRD: CPT | Performed by: STUDENT IN AN ORGANIZED HEALTH CARE EDUCATION/TRAINING PROGRAM

## 2025-08-06 PROCEDURE — 4010F ACE/ARB THERAPY RXD/TAKEN: CPT | Performed by: STUDENT IN AN ORGANIZED HEALTH CARE EDUCATION/TRAINING PROGRAM

## 2025-08-06 PROCEDURE — 1125F AMNT PAIN NOTED PAIN PRSNT: CPT | Performed by: STUDENT IN AN ORGANIZED HEALTH CARE EDUCATION/TRAINING PROGRAM

## 2025-08-06 PROCEDURE — 1160F RVW MEDS BY RX/DR IN RCRD: CPT | Performed by: STUDENT IN AN ORGANIZED HEALTH CARE EDUCATION/TRAINING PROGRAM

## 2025-08-06 PROCEDURE — 99024 POSTOP FOLLOW-UP VISIT: CPT | Performed by: STUDENT IN AN ORGANIZED HEALTH CARE EDUCATION/TRAINING PROGRAM

## 2025-08-06 ASSESSMENT — PAIN SCALES - GENERAL: PAINLEVEL_OUTOF10: 3

## 2025-08-06 NOTE — PROGRESS NOTES
PRIMARY CARE PHYSICIAN: Keely Bradley MD    ORTHOPAEDIC POSTOPERATIVE VISIT    ASSESSMENT & PLAN    Impression: 70 y.o. male 10 weeks postop s/p Right knee arthroscopy, partial medial meniscectomy, intra-articular debridement and synovectomy, chondroplasty     Plan:   Overall Raphael is doing fairly well.  He will progressively return to his activities as he tolerates.  I previously provided him with a prescription for diclofenac as an anti-inflammatory.  He will ice and elevate and continue to focus on low impact as he progresses through his quadricep strength and hamstring flexibility exercises.     I reviewed their postoperative timeline and plan with them. They understand the postoperative precautions and the treatment plan going forward.     Follow-Up: Patient will follow-up as needed    At the end of the visit, all questions were answered in full. The patient is in agreement with the plan and recommendations. They will call the office with any questions/concerns.    Note dictated with [employer] software. Completed without full typed error editing and sent to avoid delay.    SUBJECTIVE  CHIEF COMPLAINT:   Chief Complaint   Patient presents with    Right Knee - Post-op      HPI: Raphael Crain is a 70 y.o. patient now 10 weeks postop status post Right knee arthroscopy, partial medial meniscectomy, intra-articular debridement and synovectomy, chondroplasty, done 5/27/25. Raphael reports continued pain in the right knee, radiating into the right lower leg. He is attending physical therapy regularly. He continues to take Diclofenac for his knee pain, which he feels is not as effective at managing his pain at night. Otherwise, no concerns.     REVIEW OF SYSTEMS  Constitutional: See HPI for pain assessment, No significant recent weight gain or loss, no fever or chills  Cardiovascular: No chest pain, shortness of breath  Respiratory: No difficulty breathing, no cough  Gastrointestinal: No  "nausea, vomiting, diarrhea, constipation  Musculoskeletal: Noted in HPI, positive for pain, restricted motion, stiffness  Integumentary: No rashes, easy bruising or skin lesions  Neurological: No headache, no numbness or tingling in extremities  Psychiatric: No mood changes or memory changes. No social issues  Heme/Lymph: No excessive swelling, easy bruising or excessive bleeding  ENT: No hearing changes, no nosebleeds  Eyes: No vision changes    CURRENT MEDICATIONS:   Current Medications[1]     OBJECTIVE    PHYSICAL EXAM      5/27/2025    12:50 PM 5/27/2025    12:55 PM 5/27/2025     1:00 PM 5/27/2025     1:15 PM 5/27/2025     1:30 PM 6/11/2025     8:57 AM 7/2/2025    11:06 AM   Vitals   Systolic  128 128 134 144     Diastolic  79 79 85 82     BP Location  Left arm        Heart Rate 47 46 45 55 56     Temp   36.5 °C (97.7 °F) 36.4 °C (97.5 °F) 36.2 °C (97.2 °F)     Resp 9 10 10 14 14     Height      1.778 m (5' 10\") 1.778 m (5' 10\")   Weight (lb)      190 190   BMI      27.26 kg/m2 27.26 kg/m2   BSA (m2)      2.06 m2 2.06 m2   Visit Report      Report Report      There is no height or weight on file to calculate BMI.    General: Well-appearing, no acute distress    Skin intact bilateral upper and lower extremities  No erythema  No warmth    Detailed examination of right knee demonstrates:  Surgical incisions well-healed  No erythema or warmth  No ecchymosis, no swelling, no effusion  Knee range of motion: 0-0-120  Trace quadriceps inhibition and trace atrophy  Patella mobility 1+ medial and lateral  Lower extremity motor grossly intact  L4-S1 sensation intact bilaterally  2+ DP/PT pulses bilaterally  Warm and well-perfused, brisk capillary refill    IMAGING:   No new imaging          [1]   Current Outpatient Medications   Medication Sig Dispense Refill    amLODIPine (Norvasc) 5 mg tablet Take 1 tablet (5 mg) by mouth once daily. 90 tablet 1    aspirin ([place]) 81 mg capsule Take 81 mg by mouth once daily.      " cyanocobalamin (Vitamin B-12) 1,000 mcg tablet Take 1 tablet (1,000 mcg) by mouth once daily.      diclofenac (Voltaren) 75 mg EC tablet TAKE 1 TABLET(75 MG) BY MOUTH TWICE DAILY. DO NOT CRUSH, CHEW, OR SPLIT 60 tablet 0    dicyclomine (Bentyl) 20 mg tablet Take 1 tablet (20 mg) by mouth 4 times a day as needed (For abdominal pain, bloating, and cramping). Do not take immediately prior to driving as this medication may cause drowsiness. 120 tablet 11    gabapentin (Neurontin) 300 mg capsule Take 1 capsule (300 mg) by mouth 3 times a day. (Patient not taking: Reported on 4/14/2025) 90 capsule 3    levothyroxine (Synthroid, Levoxyl) 150 mcg tablet Take 1 tablet (150 mcg) by mouth once daily. 90 tablet 1    losartan (Cozaar) 100 mg tablet Take 1 tablet (100 mg) by mouth once daily. 90 tablet 1    meloxicam (Mobic) 15 mg tablet Take 1 tablet (15 mg) by mouth once daily. 30 tablet 1    metFORMIN (Glucophage) 1,000 mg tablet Take 1 tablet (1,000 mg) by mouth once daily with breakfast. 100 tablet 3    metoprolol succinate XL (Toprol-XL) 50 mg 24 hr tablet Take 1 tablet (50 mg) by mouth once daily. 90 tablet 3    omeprazole (PriLOSEC) 40 mg DR capsule Take 1 capsule (40 mg) by mouth once daily. 90 capsule 1    pravastatin (Pravachol) 40 mg tablet Take 1 tablet (40 mg) by mouth once daily. 90 tablet 1     No current facility-administered medications for this visit.

## 2025-08-07 ENCOUNTER — APPOINTMENT (OUTPATIENT)
Dept: PRIMARY CARE | Facility: CLINIC | Age: 70
End: 2025-08-07
Payer: MEDICARE

## 2025-08-12 ENCOUNTER — TREATMENT (OUTPATIENT)
Dept: PHYSICAL THERAPY | Facility: CLINIC | Age: 70
End: 2025-08-12
Payer: MEDICARE

## 2025-08-12 DIAGNOSIS — R26.2 DIFFICULTY WALKING: Primary | ICD-10-CM

## 2025-08-12 DIAGNOSIS — S83.241D ACUTE MEDIAL MENISCUS TEAR, RIGHT, SUBSEQUENT ENCOUNTER: ICD-10-CM

## 2025-08-12 DIAGNOSIS — M25.561 ACUTE PAIN OF RIGHT KNEE: ICD-10-CM

## 2025-08-12 PROCEDURE — 97112 NEUROMUSCULAR REEDUCATION: CPT | Mod: GP | Performed by: PHYSICAL THERAPIST

## 2025-08-12 PROCEDURE — 97110 THERAPEUTIC EXERCISES: CPT | Mod: GP | Performed by: PHYSICAL THERAPIST

## 2025-08-12 ASSESSMENT — PAIN - FUNCTIONAL ASSESSMENT: PAIN_FUNCTIONAL_ASSESSMENT: 0-10

## 2025-08-12 ASSESSMENT — PAIN DESCRIPTION - DESCRIPTORS: DESCRIPTORS: SORE

## 2025-09-22 ENCOUNTER — APPOINTMENT (OUTPATIENT)
Dept: PRIMARY CARE | Facility: CLINIC | Age: 70
End: 2025-09-22
Payer: MEDICARE

## (undated) DEVICE — STRIP, SKIN CLOSURE, STERI STRIP, NON-REINFORCED, 0.5 X 4 IN

## (undated) DEVICE — DRAPE, INCISE, ANTIMICROBIAL, IOBAN 2, 13 X 13 IN, DISPOSABLE, STERILE

## (undated) DEVICE — SYRINGE, 20 CC, LUER LOCK

## (undated) DEVICE — GLOVE, SURGICAL, PROTEXIS PI BLUE W/NEUTHERA, 8.0, PF, LF

## (undated) DEVICE — STRIP, SKIN CLOSURE, STERI STRIP, REINFORCED, 0.5 X 4 IN

## (undated) DEVICE — Device

## (undated) DEVICE — SOLUTION, INJECTION, USP, LACTATED RINGERS, LIFECARE, 1000ML

## (undated) DEVICE — GLOVE, SURGICAL, PROTEXIS PI , 6.5, PF, LF

## (undated) DEVICE — DRAPE, SHEET, THREE QUARTER, FAN FOLD, 57 X 77 IN

## (undated) DEVICE — GLOVE, SURGICAL, PROTEXIS PI , 7.5, PF, LF

## (undated) DEVICE — SPONGE, GAUZE, AVANT, STERILE, NONWOVEN, 4PLY, 4 X 4, STANDARD

## (undated) DEVICE — CUFF, TOURNIQUET, 30 X 4, DUAL PORT/SNGL BLADDER, DISP, LF

## (undated) DEVICE — BANDAGE, ESMARK, 6 IN X 12 FT

## (undated) DEVICE — NEEDLE, SPINAL, S/SU, 18GA 3IN, QUINCKE, STERILE

## (undated) DEVICE — BANDAGE, ELASTIC, MATRIX, SELF-CLOSURE, 6 IN X 5 YD, LF

## (undated) DEVICE — GLOVE, SURGICAL, PROTEXIS PI BLUE W/NEUTHERA, 6.5, PF, LF

## (undated) DEVICE — TUBING, PATIENT 8FT STERILE

## (undated) DEVICE — SHAVER, BONE CUTTER, 4.0MM

## (undated) DEVICE — TUBING, PUMP REDEUCE 8FT STERILE

## (undated) DEVICE — PADDING, UNDERCAST, WEBRIL, 6 IN X 4 YD, REG, NS

## (undated) DEVICE — DRESSING, GAUZE, PETROLATUM, STRIP, XEROFORM, 1 X 8 IN, STERILE

## (undated) DEVICE — TUBING, SUCTION, 3/16 X 1/16 IN

## (undated) DEVICE — GLOVE, PROTEXIS PI CLASSIC, SZ-6.5, PF, LF

## (undated) DEVICE — TUBING, PUMP MAIN 16FT STERILE

## (undated) DEVICE — SUTURE, MONOCRYL, 3-0, 27 IN, PS-2, UNDYED

## (undated) DEVICE — DRESSING, ABDOMINAL PAD, CURITY, 8 X 10 IN

## (undated) DEVICE — INK, SKIN MARKING 1OZ

## (undated) DEVICE — NEEDLE, HYPODERMIC, PROEDGE, 22G X 1.5, BLACK